# Patient Record
Sex: MALE | Race: WHITE | NOT HISPANIC OR LATINO | Employment: FULL TIME | URBAN - METROPOLITAN AREA
[De-identification: names, ages, dates, MRNs, and addresses within clinical notes are randomized per-mention and may not be internally consistent; named-entity substitution may affect disease eponyms.]

---

## 2017-01-10 RX ORDER — APIXABAN 5 MG/1
TABLET, FILM COATED ORAL
Qty: 60 TABLET | Refills: 6 | Status: SHIPPED | OUTPATIENT
Start: 2017-01-10 | End: 2017-08-14 | Stop reason: SDUPTHER

## 2017-01-23 RX ORDER — TRAZODONE HYDROCHLORIDE 100 MG/1
TABLET ORAL
Qty: 30 TABLET | Refills: 2 | Status: SHIPPED | OUTPATIENT
Start: 2017-01-23 | End: 2017-04-26 | Stop reason: SDUPTHER

## 2017-03-13 RX ORDER — LOSARTAN POTASSIUM 100 MG/1
TABLET ORAL
Qty: 30 TABLET | Refills: 6 | Status: SHIPPED | OUTPATIENT
Start: 2017-03-13 | End: 2017-08-14 | Stop reason: SDUPTHER

## 2017-03-13 RX ORDER — FINASTERIDE 1 MG/1
1 TABLET, FILM COATED ORAL DAILY
Qty: 30 TABLET | Refills: 5 | Status: SHIPPED | OUTPATIENT
Start: 2017-03-13 | End: 2017-09-12 | Stop reason: SDUPTHER

## 2017-04-27 RX ORDER — TRAZODONE HYDROCHLORIDE 100 MG/1
TABLET ORAL
Qty: 30 TABLET | Refills: 0 | Status: SHIPPED | OUTPATIENT
Start: 2017-04-27 | End: 2018-05-21

## 2017-05-22 RX ORDER — LEVOTHYROXINE SODIUM 88 UG/1
TABLET ORAL
Qty: 30 TABLET | Refills: 0 | Status: SHIPPED | OUTPATIENT
Start: 2017-05-22 | End: 2017-06-19 | Stop reason: SDUPTHER

## 2017-06-19 RX ORDER — LEVOTHYROXINE SODIUM 88 UG/1
TABLET ORAL
Qty: 30 TABLET | Refills: 3 | Status: SHIPPED | OUTPATIENT
Start: 2017-06-19 | End: 2017-09-27 | Stop reason: SDUPTHER

## 2017-08-14 ENCOUNTER — OFFICE VISIT (OUTPATIENT)
Dept: CARDIOLOGY | Facility: CLINIC | Age: 57
End: 2017-08-14

## 2017-08-14 VITALS
BODY MASS INDEX: 34.93 KG/M2 | HEART RATE: 65 BPM | DIASTOLIC BLOOD PRESSURE: 72 MMHG | SYSTOLIC BLOOD PRESSURE: 114 MMHG | HEIGHT: 74 IN | WEIGHT: 272.2 LBS

## 2017-08-14 DIAGNOSIS — I10 ESSENTIAL HYPERTENSION: ICD-10-CM

## 2017-08-14 DIAGNOSIS — E66.09 OBESITY DUE TO EXCESS CALORIES, UNSPECIFIED OBESITY SEVERITY: ICD-10-CM

## 2017-08-14 DIAGNOSIS — Z99.89 OSA ON CPAP: ICD-10-CM

## 2017-08-14 DIAGNOSIS — I48.0 PAROXYSMAL ATRIAL FIBRILLATION (HCC): Primary | ICD-10-CM

## 2017-08-14 DIAGNOSIS — G47.33 OSA ON CPAP: ICD-10-CM

## 2017-08-14 PROCEDURE — 99214 OFFICE O/P EST MOD 30 MIN: CPT | Performed by: INTERNAL MEDICINE

## 2017-08-14 RX ORDER — IBUPROFEN 400 MG/1
400 TABLET ORAL EVERY 6 HOURS PRN
COMMUNITY
End: 2018-01-17

## 2017-08-14 RX ORDER — LOSARTAN POTASSIUM 100 MG/1
100 TABLET ORAL DAILY
Qty: 90 TABLET | Refills: 3 | Status: SHIPPED | OUTPATIENT
Start: 2017-08-14 | End: 2018-08-15 | Stop reason: SDUPTHER

## 2017-08-14 NOTE — PROGRESS NOTES
[facility] AT [address], [address]  [place], [place], [address] [phone]  [email or web address]           OUTPATIENT CLINIC FOLLOW UP NOTE    Encounter Date:11/14/2016    Patient Care Team:  Patient Care Team:  Gail Chandler MD as PCP - General  Gail Chandler MD as PCP - Family Medicine    Subjective:   Reason for consultation:   Chief Complaint   Patient presents with   • Hypertension   • Atrial Fibrillation       HPI:    Katharine Lantigua is a 57 y.o. male.  Hypertension     Atrial Fibrillation   Past medical history includes atrial fibrillation.     The patient has a history of paroxysmal atrial fibrillation status post failed cardioversion, failed flecainide and dofetilide therapy, status post successful cryoablation of the pulmonary veins 11/2015 Dr Silveira, hypertension, recent orthostasis.  The patient presents for follow-up.    Since his last visit in 11/2016 he has done well from a cardiac standpoint.  He denies significant palpitations.  He has had one episode of mild self resolving palpitations after a long walk.  No chest pain or trouble breathing with exertion.  He has gained some weight recently.  He is undergoing significant stress.  His wife has spent much of the last year in and out of rehabilitation and hospitals for alcohol addiction.  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      PFSH:  Patient Active Problem List   Diagnosis   • Paroxysmal atrial fibrillation   • Obesity   • TACO on CPAP   • Hypothyroidism   • Gout   • Insomnia   • Essential hypertension         Current Outpatient Prescriptions:   •  ELIQUIS 5 MG tablet tablet, take 1 tablet by mouth twice a day, Disp: 60 tablet, Rfl: 6  •  finasteride (PROPECIA) 1 MG tablet, Take 1 tablet by mouth Daily., Disp: 30 tablet, Rfl: 5  •  ibuprofen (ADVIL,MOTRIN) 400 MG tablet, Take 400 mg by mouth Every 6  "(Six) Hours As Needed for Mild Pain (1-3)., Disp: , Rfl:   •  levothyroxine (SYNTHROID, LEVOTHROID) 88 MCG tablet, take 1 tablet by mouth once daily - DUE FOR FOLLOW UP APPOINTMENT, Disp: 30 tablet, Rfl: 3  •  losartan (COZAAR) 100 MG tablet, take 1 tablet by mouth once daily, Disp: 30 tablet, Rfl: 6  •  metoprolol tartrate (LOPRESSOR) 25 MG tablet, take 1 tablet by mouth once daily, Disp: 30 tablet, Rfl: 6  •  traZODone (DESYREL) 100 MG tablet, START WITH 1 TABLET AT BEDTIME, CAN INCREASE TO 1 1/2 TABLETS IF NEEDED FOR INSOMNIA, Disp: 30 tablet, Rfl: 0    No Known Allergies     reports that he has never smoked. He has never used smokeless tobacco.    Family History   Problem Relation Age of Onset   • Hypothyroidism Mother       in 80's   • Cancer Mother    • Cancer Father    • Diabetes type II Father    • Hypertension Father    • Coronary artery disease Father      60;s   • Obesity Other    • Coronary artery disease Other        Review of Systems:  Negative for exertional chest pain, dyspnea with exertion, orthopnea, PND, lower extremity edema, palpitations, lightheadedness, syncope.   Review of Systems  All other systems reviewed and are negative.    Objective:   Blood pressure 114/72, pulse 65, height 74\" (188 cm), weight 272 lb 3.2 oz (123 kg).  Physical Exam  CONSTITUTIONAL: No acute distress, normal affect  RESPIRATORY: Normal effort. Clear to auscultation bilaterally without wheezing or rales  CARDIOVASCULAR: Carotids with normal upstrokes without bruits.  Regular rate and rhythm with normal S1 and S2. Without murmur, gallop or rub.  PERIPHERAL VASCULAR: Normal radial pulses bilaterally. There is no lower extremity edema bilaterally. Normal DP pulses bilaterally      Labs:  Lab Results   Component Value Date    ALT 28 10/26/2016    AST 19 10/26/2016     Lab Results   Component Value Date    CHOL 249 (H) 10/26/2016    TRIG 203 (H) 10/26/2016    HDL 74 (H) 10/26/2016    LDLDIRECT 153 (H) 10/26/2016    " CREATININE 0.90 10/26/2016       Diagnostic Data:    Procedures    Assessment and Plan:   Katharine was seen today for hypertension and atrial fibrillation.    Diagnoses and all orders for this visit:    Paroxysmal atrial fibrillation  Essential hypertension  TACO on CPAP  Obesity due to excess calories, unspecified obesity severity  -Status post pulmonary vein cryoablation in 11/2015 by Dr. Silveira, remains in a regular rhythm today.  Continue metoprolol and apixaban  -Continue metoprolol, losartan for blood pressure.  At home ranges in the 120 to 130s systolic over 80s diastolic.  - ASCVD 6.9% 11/2014, PCP to recheck labs soon    - Dietary and exercise counseling was provided during this visit  - Patient to try high-protein low-carb diet.  Modified Atkins diet  - Patient to attempt increasing his exercise.  He had times walks several miles at the [facility] at a 14 min/mile pace    - Return in about 9 months (around 5/14/2018).    Gabriel Hanna MD, MSc, [facility]  Interventional Cardiology  [facility]

## 2017-09-12 RX ORDER — FINASTERIDE 1 MG/1
TABLET, FILM COATED ORAL
Qty: 30 TABLET | Refills: 0 | Status: SHIPPED | OUTPATIENT
Start: 2017-09-12 | End: 2017-11-09 | Stop reason: SDUPTHER

## 2017-09-27 RX ORDER — LEVOTHYROXINE SODIUM 88 UG/1
TABLET ORAL
Qty: 30 TABLET | Refills: 0 | Status: SHIPPED | OUTPATIENT
Start: 2017-09-27 | End: 2017-11-06 | Stop reason: SDUPTHER

## 2017-10-23 RX ORDER — LEVOTHYROXINE SODIUM 88 UG/1
TABLET ORAL
Qty: 30 TABLET | Refills: 0 | OUTPATIENT
Start: 2017-10-23

## 2017-10-23 RX ORDER — FINASTERIDE 1 MG/1
TABLET, FILM COATED ORAL
Qty: 30 TABLET | Refills: 0 | OUTPATIENT
Start: 2017-10-23

## 2017-10-23 NOTE — TELEPHONE ENCOUNTER
I left him a message letting him know we have not seen him since October of last year.  He will need an appointment for med refills.

## 2017-10-30 RX ORDER — FINASTERIDE 1 MG/1
TABLET, FILM COATED ORAL
Qty: 30 TABLET | Refills: 0 | OUTPATIENT
Start: 2017-10-30

## 2017-10-30 RX ORDER — LEVOTHYROXINE SODIUM 88 UG/1
TABLET ORAL
Qty: 30 TABLET | Refills: 0 | OUTPATIENT
Start: 2017-10-30

## 2017-11-06 ENCOUNTER — TELEPHONE (OUTPATIENT)
Dept: INTERNAL MEDICINE | Facility: CLINIC | Age: 57
End: 2017-11-06

## 2017-11-06 RX ORDER — LEVOTHYROXINE SODIUM 88 UG/1
88 TABLET ORAL DAILY
Qty: 30 TABLET | Refills: 0 | Status: SHIPPED | OUTPATIENT
Start: 2017-11-06 | End: 2017-11-09 | Stop reason: SDUPTHER

## 2017-11-06 NOTE — TELEPHONE ENCOUNTER
He is making an appointment to come in and is completely out of his thyroid medication.  Can you fill for one more month?

## 2017-11-08 ENCOUNTER — OFFICE VISIT (OUTPATIENT)
Dept: INTERNAL MEDICINE | Facility: CLINIC | Age: 57
End: 2017-11-08

## 2017-11-08 VITALS
SYSTOLIC BLOOD PRESSURE: 116 MMHG | BODY MASS INDEX: 34.6 KG/M2 | DIASTOLIC BLOOD PRESSURE: 72 MMHG | WEIGHT: 269.6 LBS | HEIGHT: 74 IN | TEMPERATURE: 96.8 F

## 2017-11-08 DIAGNOSIS — E78.49 OTHER HYPERLIPIDEMIA: ICD-10-CM

## 2017-11-08 DIAGNOSIS — Z11.59 NEED FOR HEPATITIS C SCREENING TEST: ICD-10-CM

## 2017-11-08 DIAGNOSIS — Z23 NEED FOR INFLUENZA VACCINATION: ICD-10-CM

## 2017-11-08 DIAGNOSIS — E03.8 OTHER SPECIFIED HYPOTHYROIDISM: Primary | ICD-10-CM

## 2017-11-08 DIAGNOSIS — I10 ESSENTIAL HYPERTENSION: ICD-10-CM

## 2017-11-08 DIAGNOSIS — Z99.89 OSA ON CPAP: ICD-10-CM

## 2017-11-08 DIAGNOSIS — G47.33 OSA ON CPAP: ICD-10-CM

## 2017-11-08 DIAGNOSIS — N40.0 BENIGN PROSTATIC HYPERPLASIA WITHOUT LOWER URINARY TRACT SYMPTOMS: ICD-10-CM

## 2017-11-08 LAB
ALBUMIN SERPL-MCNC: 4.1 G/DL (ref 3.2–4.8)
ALBUMIN/GLOB SERPL: 2.1 G/DL (ref 1.5–2.5)
ALP SERPL-CCNC: 46 U/L (ref 25–100)
ALT SERPL W P-5'-P-CCNC: 23 U/L (ref 7–40)
ANION GAP SERPL CALCULATED.3IONS-SCNC: 5 MMOL/L (ref 3–11)
ARTICHOKE IGE QN: 129 MG/DL (ref 0–130)
AST SERPL-CCNC: 15 U/L (ref 0–33)
BASOPHILS # BLD AUTO: 0.03 10*3/MM3 (ref 0–0.2)
BASOPHILS NFR BLD AUTO: 0.4 % (ref 0–1)
BILIRUB SERPL-MCNC: 0.6 MG/DL (ref 0.3–1.2)
BUN BLD-MCNC: 24 MG/DL (ref 9–23)
BUN/CREAT SERPL: 24 (ref 7–25)
CALCIUM SPEC-SCNC: 9 MG/DL (ref 8.7–10.4)
CHLORIDE SERPL-SCNC: 107 MMOL/L (ref 99–109)
CHOLEST SERPL-MCNC: 204 MG/DL (ref 0–200)
CO2 SERPL-SCNC: 28 MMOL/L (ref 20–31)
CREAT BLD-MCNC: 1 MG/DL (ref 0.6–1.3)
DEPRECATED RDW RBC AUTO: 47.5 FL (ref 37–54)
EOSINOPHIL # BLD AUTO: 0.19 10*3/MM3 (ref 0–0.3)
EOSINOPHIL NFR BLD AUTO: 2.5 % (ref 0–3)
ERYTHROCYTE [DISTWIDTH] IN BLOOD BY AUTOMATED COUNT: 13.9 % (ref 11.3–14.5)
GFR SERPL CREATININE-BSD FRML MDRD: 77 ML/MIN/1.73
GLOBULIN UR ELPH-MCNC: 2 GM/DL
GLUCOSE BLD-MCNC: 98 MG/DL (ref 70–100)
HCT VFR BLD AUTO: 45 % (ref 38.9–50.9)
HCV AB SER DONR QL: NORMAL
HDLC SERPL-MCNC: 69 MG/DL (ref 40–60)
HGB BLD-MCNC: 15.2 G/DL (ref 13.1–17.5)
IMM GRANULOCYTES # BLD: 0.02 10*3/MM3 (ref 0–0.03)
IMM GRANULOCYTES NFR BLD: 0.3 % (ref 0–0.6)
LYMPHOCYTES # BLD AUTO: 1.73 10*3/MM3 (ref 0.6–4.8)
LYMPHOCYTES NFR BLD AUTO: 22.7 % (ref 24–44)
MCH RBC QN AUTO: 31.4 PG (ref 27–31)
MCHC RBC AUTO-ENTMCNC: 33.8 G/DL (ref 32–36)
MCV RBC AUTO: 93 FL (ref 80–99)
MONOCYTES # BLD AUTO: 0.88 10*3/MM3 (ref 0–1)
MONOCYTES NFR BLD AUTO: 11.5 % (ref 0–12)
NEUTROPHILS # BLD AUTO: 4.77 10*3/MM3 (ref 1.5–8.3)
NEUTROPHILS NFR BLD AUTO: 62.6 % (ref 41–71)
PLATELET # BLD AUTO: 295 10*3/MM3 (ref 150–450)
PMV BLD AUTO: 10.6 FL (ref 6–12)
POTASSIUM BLD-SCNC: 4.3 MMOL/L (ref 3.5–5.5)
PROT SERPL-MCNC: 6.1 G/DL (ref 5.7–8.2)
PSA SERPL-MCNC: 0.45 NG/ML (ref 0–4)
RBC # BLD AUTO: 4.84 10*6/MM3 (ref 4.2–5.76)
SODIUM BLD-SCNC: 140 MMOL/L (ref 132–146)
T4 FREE SERPL-MCNC: 1.21 NG/DL (ref 0.89–1.76)
TRIGL SERPL-MCNC: 78 MG/DL (ref 0–150)
TSH SERPL DL<=0.05 MIU/L-ACNC: 4.53 MIU/ML (ref 0.35–5.35)
WBC NRBC COR # BLD: 7.62 10*3/MM3 (ref 3.5–10.8)

## 2017-11-08 PROCEDURE — 84439 ASSAY OF FREE THYROXINE: CPT | Performed by: INTERNAL MEDICINE

## 2017-11-08 PROCEDURE — 85025 COMPLETE CBC W/AUTO DIFF WBC: CPT | Performed by: INTERNAL MEDICINE

## 2017-11-08 PROCEDURE — 90471 IMMUNIZATION ADMIN: CPT | Performed by: INTERNAL MEDICINE

## 2017-11-08 PROCEDURE — 86803 HEPATITIS C AB TEST: CPT | Performed by: INTERNAL MEDICINE

## 2017-11-08 PROCEDURE — 99214 OFFICE O/P EST MOD 30 MIN: CPT | Performed by: INTERNAL MEDICINE

## 2017-11-08 PROCEDURE — 90686 IIV4 VACC NO PRSV 0.5 ML IM: CPT | Performed by: INTERNAL MEDICINE

## 2017-11-08 PROCEDURE — 80061 LIPID PANEL: CPT | Performed by: INTERNAL MEDICINE

## 2017-11-08 PROCEDURE — 80053 COMPREHEN METABOLIC PANEL: CPT | Performed by: INTERNAL MEDICINE

## 2017-11-08 PROCEDURE — 84153 ASSAY OF PSA TOTAL: CPT | Performed by: INTERNAL MEDICINE

## 2017-11-08 PROCEDURE — 84443 ASSAY THYROID STIM HORMONE: CPT | Performed by: INTERNAL MEDICINE

## 2017-11-08 RX ORDER — BUPROPION HYDROCHLORIDE 300 MG/1
450 TABLET ORAL DAILY
COMMUNITY
End: 2020-06-08

## 2017-11-08 NOTE — PROGRESS NOTES
"Subjective   Katharine Lantigua is a 57 y.o. male.     History of Present Illness   Here for f/u on:    Hypothyroid - is tired a lot, but he feels it is more from lack of sleep than the thyroid. Missed about a week recently    htn - bp has been good. On the losartan and metoprolol. Cardiology is prescribing    H/o a fib - he is status post pulmonary vein cryoablation in 11/2015 by Dr. Silveira. He is on eliquis. No recurrences    Insomnia - he is taking 2 of the 100mg's of the trazodone. He is seeing Dr Zaragoza now. He has also tried seroquel and ambien and lunesta - . He is not using the CPAP - trouble tolerating. He would like a referral to see [facility] and see what type of mask they recommend. Falls asleep ok, but wakes up a lot. Not much caffeine after 2-3pm. Some etoh but not as much as he had been on.     Depression - he is taking wellbutrin now through Dr Zaragoza. maybe helping a little.    The following portions of the patient's history were reviewed and updated as appropriate: allergies, current medications, past family history, past medical history, past social history, past surgical history and problem list.    Review of Systems   Constitutional: Positive for fatigue. Negative for activity change (walking1-2x/week), appetite change and unexpected weight change.   Cardiovascular: Negative for chest pain, palpitations and leg swelling.   Gastrointestinal: Negative for constipation and diarrhea.   Psychiatric/Behavioral: Positive for dysphoric mood and sleep disturbance.       Objective   Blood pressure 116/72, temperature 96.8 °F (36 °C), temperature source Temporal Artery , height 74\" (188 cm), weight 269 lb 9.6 oz (122 kg).  Physical Exam   Constitutional: He is oriented to person, place, and time. He appears well-developed and well-nourished. No distress.   HENT:   Head: Normocephalic and atraumatic.   Eyes: Conjunctivae and EOM are normal.   Cardiovascular: Normal rate, regular rhythm and normal heart " sounds.  Exam reveals no gallop and no friction rub.    No murmur heard.  Pulmonary/Chest: Effort normal and breath sounds normal. No respiratory distress. He has no wheezes.   Neurological: He is alert and oriented to person, place, and time.   Skin: Skin is warm and dry. He is not diaphoretic.   Psychiatric: He has a normal mood and affect. His behavior is normal. Judgment and thought content normal.       Assessment/Plan   Katharine was seen today for med refill, hypertension and hypothyroidism.    Diagnoses and all orders for this visit:    Other specified hypothyroidism  -     TSH  -     T4, Free    Need for influenza vaccination  -     Flu Vaccine Quad PF 3YR+ (FLUARIX/FLUZONE [id number])    Essential hypertension  -     CBC & Differential  -     Comprehensive Metabolic Panel    TACO - not using CPAP right now. Will refer to . He has long h/o insomnia as well, and is seeing Dr Zaragoza for this  -     Ambulatory Referral to Sleep Medicine    Benign prostatic hyperplasia without lower urinary tract symptoms  -     PSA    Other hyperlipidemia  -     Lipid Panel    Need for hepatitis C screening test  -     Hepatitis C Antibody      Prev - he wants to see urology for prostate exam - he prefers to schedule himself and will call if he needs referral. Colon will be due next year w/ [facility]. Hep C screen today.

## 2017-11-09 RX ORDER — LEVOTHYROXINE SODIUM 88 UG/1
88 TABLET ORAL DAILY
Qty: 90 TABLET | Refills: 3 | Status: SHIPPED | OUTPATIENT
Start: 2017-11-09 | End: 2018-10-09 | Stop reason: SDUPTHER

## 2017-11-09 RX ORDER — FINASTERIDE 1 MG/1
1 TABLET, FILM COATED ORAL DAILY
Qty: 90 TABLET | Refills: 3 | Status: SHIPPED | OUTPATIENT
Start: 2017-11-09 | End: 2018-11-14 | Stop reason: SDUPTHER

## 2018-01-17 ENCOUNTER — OFFICE VISIT (OUTPATIENT)
Dept: INTERNAL MEDICINE | Facility: CLINIC | Age: 58
End: 2018-01-17

## 2018-01-17 VITALS
WEIGHT: 260.8 LBS | HEART RATE: 59 BPM | OXYGEN SATURATION: 98 % | RESPIRATION RATE: 14 BRPM | HEIGHT: 74 IN | DIASTOLIC BLOOD PRESSURE: 74 MMHG | BODY MASS INDEX: 33.47 KG/M2 | TEMPERATURE: 97.4 F | SYSTOLIC BLOOD PRESSURE: 118 MMHG

## 2018-01-17 DIAGNOSIS — I83.90 VARICOSE VEIN OF LEG: ICD-10-CM

## 2018-01-17 DIAGNOSIS — Z00.00 ANNUAL PHYSICAL EXAM: Primary | ICD-10-CM

## 2018-01-17 DIAGNOSIS — M79.672 BILATERAL FOOT PAIN: ICD-10-CM

## 2018-01-17 DIAGNOSIS — Z99.89 OSA ON CPAP: ICD-10-CM

## 2018-01-17 DIAGNOSIS — G47.33 OSA ON CPAP: ICD-10-CM

## 2018-01-17 DIAGNOSIS — M79.671 BILATERAL FOOT PAIN: ICD-10-CM

## 2018-01-17 LAB
BILIRUB BLD-MCNC: NEGATIVE MG/DL
CLARITY, POC: CLEAR
COLOR UR: NORMAL
GLUCOSE UR STRIP-MCNC: NEGATIVE MG/DL
KETONES UR QL: NEGATIVE
LEUKOCYTE EST, POC: NEGATIVE
NITRITE UR-MCNC: NEGATIVE MG/ML
PH UR: 5 [PH] (ref 5–8)
PROT UR STRIP-MCNC: NEGATIVE MG/DL
RBC # UR STRIP: NEGATIVE /UL
SP GR UR: 1.03 (ref 1–1.03)
UROBILINOGEN UR QL: NORMAL

## 2018-01-17 PROCEDURE — 81003 URINALYSIS AUTO W/O SCOPE: CPT | Performed by: INTERNAL MEDICINE

## 2018-01-17 PROCEDURE — 99396 PREV VISIT EST AGE 40-64: CPT | Performed by: INTERNAL MEDICINE

## 2018-01-17 RX ORDER — LORAZEPAM 1 MG/1
1 TABLET ORAL EVERY 8 HOURS PRN
COMMUNITY
End: 2018-05-21

## 2018-01-17 NOTE — PROGRESS NOTES
Subjective   Katharine Lantigua is a 57 y.o. male.     History of Present Illness   Here for a physical    Exercise- trying to walk some but not every day. May try rowing class at gym too  Diet - not great, eating out a lot, more red meat and processed foods    TACO - will see sleep clinic at  in 2/18. Can't use the CPAP, just can't tolerate. Sleep is still poor. Uses ativan and trazodone at night. He is trying to cut  Back on ETOH, usually just wine a few times every few weeks w/ dinner. Trying to cut back on caffiene as well, and cut back on screen times    Anxiety - seeing Dr Zaragoza and on wellbutrin. Helps some, but still quite a bit of stress with his wife, who is alcoholic and has relapsed multiple times. He has 2 daughters and one is staying w/ her aunt, and the other may go to boarding school    htn - no problems w/ his meds, is taking just one a day on the metoprolol, and has been for some time, though he realized recently that the bottle says BID. Has not had any palpitations or recurrences of the a fib. He sees Dr Hanna in [place] B foot pain intermittently. Pain around the metatarsals. Not necessarily on the days he walks. No pain in am like plantar fasciitis. At times feet will swell. Also has noticed a small knot above R medial malleolus x several years - if he hits it accidentally, will have shooting pain up leg.     Has a varicose vein on left leg. Does hurt and does have the foot swelling sometimes. He did see vascular surgery - Dr Santamaria - 6-7 years ago and did try compression stocking but didn't seem to help. He would like to see what could be done for it    The following portions of the patient's history were reviewed and updated as appropriate: allergies, current medications, past family history, past medical history, past social history, past surgical history and problem list.    Review of Systems   Constitutional: Positive for unexpected weight change (has lost weight, trying to lose weight).  "Negative for activity change and appetite change.   Respiratory: Negative for shortness of breath.    Cardiovascular: Positive for leg swelling. Negative for chest pain and palpitations.   Gastrointestinal: Negative for abdominal pain, anal bleeding, blood in stool, constipation and diarrhea.   Genitourinary: Positive for frequency (nocturia - 1-5x/night).   Musculoskeletal: Positive for arthralgias (B foot pain).   Skin:        Varicose vein on L lower leg   Psychiatric/Behavioral: Positive for dysphoric mood. The patient is nervous/anxious.        Objective   Blood pressure 118/74, pulse 59, temperature 97.4 °F (36.3 °C), temperature source Temporal Artery , resp. rate 14, height 187.3 cm (73.75\"), weight 118 kg (260 lb 12.8 oz), SpO2 98 %.  Physical Exam   Constitutional: He is oriented to person, place, and time. He appears well-developed and well-nourished. No distress.   HENT:   Head: Normocephalic and atraumatic.   Eyes: Conjunctivae and EOM are normal.   Cardiovascular: Normal rate, regular rhythm, normal heart sounds and intact distal pulses.  Exam reveals no gallop and no friction rub.    No murmur heard.  Pulmonary/Chest: Effort normal and breath sounds normal. No respiratory distress. He has no wheezes.   Abdominal: Soft. Bowel sounds are normal.   Genitourinary: Prostate normal.   Musculoskeletal: Normal range of motion. He exhibits tenderness (soft nodule above right medial ankle that is tender. no tenderness over metatarsals or heel). He exhibits no edema.   Neurological: He is alert and oriented to person, place, and time.   Skin: Skin is warm and dry. He is not diaphoretic.   Varicose vein on left leg   Psychiatric: He has a normal mood and affect. His behavior is normal. Judgment and thought content normal.       Assessment/Plan   Katharine was seen today for annual exam and hypertension.    Diagnoses and all orders for this visit:    Annual physical exam -regular exercise/healthy diet/wt loss " encouraged. has had zostavax - can get shingrex when available. dT due in '24. colon due in '17-'19 - he would like to do around 12/18 - will set up reminder for this time. sunscreen use encouraged. He had labs done 11/17, so will repeat in 5/18 or 11/18. He declines any BPH medication at this point. OK to continue the metoprolol at just one a day since BP and pulse are well controlled  -     POC Urinalysis Dipstick, Automated    Bilateral foot pain  -     Ambulatory Referral to Orthopedic Surgery    Varicose vein of leg  -     Ambulatory Referral to Vascular Surgery    TACO - not using CPAP. He does have appt scheduled at  in 2/18. Wt loss encouraged as well    Situational anxiety/depression - names of counselors given

## 2018-02-14 ENCOUNTER — OFFICE VISIT (OUTPATIENT)
Dept: ORTHOPEDIC SURGERY | Facility: CLINIC | Age: 58
End: 2018-02-14

## 2018-02-14 VITALS
SYSTOLIC BLOOD PRESSURE: 125 MMHG | BODY MASS INDEX: 34.19 KG/M2 | HEART RATE: 65 BPM | WEIGHT: 257.94 LBS | HEIGHT: 73 IN | DIASTOLIC BLOOD PRESSURE: 90 MMHG

## 2018-02-14 DIAGNOSIS — B07.0 PLANTAR WART OF RIGHT FOOT: ICD-10-CM

## 2018-02-14 DIAGNOSIS — R22.41 ANKLE MASS, RIGHT: ICD-10-CM

## 2018-02-14 DIAGNOSIS — I87.8 VENOUS STASIS: ICD-10-CM

## 2018-02-14 DIAGNOSIS — M25.579 PAIN IN JOINT INVOLVING ANKLE AND FOOT, UNSPECIFIED LATERALITY: Primary | ICD-10-CM

## 2018-02-14 PROCEDURE — 99204 OFFICE O/P NEW MOD 45 MIN: CPT | Performed by: ORTHOPAEDIC SURGERY

## 2018-02-14 NOTE — PROGRESS NOTES
NEW PATIENT    Patient: Katharine Lantigua  : 1960    Primary Care Provider: Gail Chandler MD    Requesting Provider: As above    Pain, Numbness, and Edema of the Right Foot and Pain, Numbness, and Edema of the Left Foot      History    Chief Complaint: Several foot problems    History of Present Illness: This is a very pleasant 57-year-old  here with several foot and ankle problems.  One problem is that he has noticed swelling in his legs, for a number of years.  He has some venous stasis changes and varicosities, and he definitely needs to wear support stockings.  A second problem is that he has had some intermittent numbness in the left foot third and fourth toes, and some intermittent sharp and burning pain under the left foot second and third metatarsal heads.  Sometimes this is worse with driving.  Sometimes it's better with his shoe off.  He has not done anything specific for it.  He also has noted a 10 year history of a small nontender nodule on the dorsal aspect of the left great toe at DIP joint.  A more painful problem is he has an area about 4 cm above the ankle joint, just posterior to the tibia, about residential between the tibia and the Achilles tendon, that is exquisitely tender to palpation.  It's a thickened almost plaque-like feeling, and causes very remarkable pain.  He reports if anything hits it or touches it the has to jump.  This has been present about 3 years.  This pain he rates as an 8 out of 10 when it happens.  It happens that it gets bumped about once or twice a month.    Current Outpatient Prescriptions on File Prior to Visit   Medication Sig Dispense Refill   • apixaban (ELIQUIS) 5 MG tablet tablet Take 1 tablet by mouth Every 12 (Twelve) Hours. 180 tablet 3   • buPROPion XL (WELLBUTRIN XL) 300 MG 24 hr tablet Take 300 mg by mouth Daily.     • finasteride (PROPECIA) 1 MG tablet Take 1 tablet by mouth Daily. 90 tablet 3   • levothyroxine (SYNTHROID, LEVOTHROID) 88 MCG tablet  Take 1 tablet by mouth Daily. 90 tablet 3   • LORazepam (ATIVAN) 1 MG tablet Take 1 mg by mouth Every 8 (Eight) Hours As Needed for Sleep.     • losartan (COZAAR) 100 MG tablet Take 1 tablet by mouth Daily. 90 tablet 3   • metoprolol tartrate (LOPRESSOR) 25 MG tablet take 1 tablet by mouth twice a day 90 tablet 3   • traZODone (DESYREL) 100 MG tablet START WITH 1 TABLET AT BEDTIME, CAN INCREASE TO 1 1/2 TABLETS IF NEEDED FOR INSOMNIA (Patient taking differently: Take 2 pills nightly for insomnia.) 30 tablet 0     No current facility-administered medications on file prior to visit.       No Known Allergies   Past Medical History:   Diagnosis Date   • Abnormal adenosine sestamibi study    • Depression     Dr. Peterson   • H/O cardiovascular stress test 04/2010    negative   • H/O colonoscopy 12/2014    Dr. Olivier- neg. repeat in 3-5 yrs, previous h/o adenomatous polyp '12 & '13   • Heart disease    • History of diagnostic ultrasound 04/2016    fatty liver, multiple hypodense lesions in liver, GB nml, CT liver recommended   • Hypertension    • Hypothyroidism    • Obesity    • TACO on CPAP 03/2016    Obstructive sleep apnea wearing CPAP. - Dr. Amin   • Osteoarthritis    • Paroxysmal atrial fibrillation 09/2015   • Screening PSA (prostate specific antigen) 04/2016    0.4; 2/15 0.4; 1/14 0.4   • Skin cancer of face      Past Surgical History:   Procedure Laterality Date   • ANAL FISTULA REPAIR      1980 & 1990   • CARDIAC ABLATION     • COLONOSCOPY     • JOINT REPLACEMENT Right    • SEPTOPLASTY     • ARCENIO  08/18/2015    ARCENIO/external cardioversion into normal sinus rhythm with initiation of flecainide, 08/18/2015.  EF 55% to 60%.   • TONSILLECTOMY     • TOTAL HIP ARTHROPLASTY Right 1999    Right total hip replacement. (h/o childhood fracture)   • VENOUS ABLATION  110/02/2015    Status post cryoablation of the pulmonary veins, 11/02/2015, with maintenance of sinus rhythm on no antiarrhythmic.     Family History   Problem  "Relation Age of Onset   • Hypothyroidism Mother       in 80's   • Cancer Mother    • Cancer Father    • Diabetes type II Father    • Hypertension Father    • Coronary artery disease Father      60;s   • Obesity Other    • Coronary artery disease Other       Social History     Social History   • Marital status:      Spouse name: N/A   • Number of children: N/A   • Years of education: N/A     Occupational History   • Not on file.     Social History Main Topics   • Smoking status: Never Smoker   • Smokeless tobacco: Never Used   • Alcohol use 1.2 oz/week     2 Glasses of wine per week      Comment: nightly   • Drug use: No   • Sexual activity: Defer     Other Topics Concern   • Not on file     Social History Narrative        Review of Systems   Constitutional: Positive for fatigue.   HENT: Positive for hearing loss and tinnitus.    Eyes: Negative.    Respiratory: Positive for apnea and shortness of breath.    Cardiovascular: Positive for leg swelling.   Gastrointestinal: Negative.    Endocrine: Negative.    Genitourinary: Positive for difficulty urinating.   Musculoskeletal: Positive for arthralgias, back pain, neck pain and neck stiffness.   Skin: Negative.    Allergic/Immunologic: Negative.    Neurological: Negative.    Hematological: Negative.    Psychiatric/Behavioral: Positive for decreased concentration and sleep disturbance. The patient is nervous/anxious.        The following portions of the patient's history were reviewed and updated as appropriate: allergies, current medications, past family history, past medical history, past social history, past surgical history and problem list.    Physical Exam:   /90  Pulse 65  Ht 185.4 cm (73\")  Wt 117 kg (257 lb 15 oz)  BMI 34.03 kg/m2  GENERAL: Body habitus: obese    Lower extremity edema: Left: trace; Right: trace    Varicose veins:  Left: moderate; Right: moderate    Gait: normal     Mental Status:  awake and alert; oriented to person, " place, and time    Voice:  clear  SKIN:  warm and dry    Hair Growth:  Right:normal; Left:  normal  NAILS: Toenails: thick  HEENT: Head: Normocephalic, atraumatic,  without obvious abnormality.  eye: normal external eye, no icterus  ears: normal external ears  nose: normal external nose  pharynx: dental hygiene adequate  PULM:  Repiratory effort normal  CV:  Dorsalis Pedis:  Right: 2+; Left:2+    Posterior Tibial: Right:2+; Left:2+    Capillary Refill:  Brisk  MSK:  Hand:right handed and sensation intact      Tibia:  Right:  non tender; Left:  non tender      Ankle:  Right: There is a thickened almost plaque-like area about 1-1/2 cm in diameter just posterior to the tibia about 4 cm above the ankle joint.  It is markedly tender to palpation.  I really cannot easily palpated firmly enough to feel the edges.  That is how painful it is.  Otherwise he is nontender has normal range of motion.; Left:  non tender, ROM  normal and symmetric and motor function  normal      Foot:  Right:  He has a tender lesion just distal to the weightbearing surface under the fifth metatarsal head area, I think it's a plantar wart, he has some other callusing and slight tenderness under the metatarsals.; Left:  He is nontender to palpation in the third webspace today, slightly tender under the second and third metatarsal heads.  He has a 6-8 mm rubbery nodule just medial to the extensor tendon at the DIP joint of the great toe, it feels like a small ganglion cyst, it is nontender      NEURO: Heel Walking:  Right:  normal; Left:  normal    Toe Walking:  Right:  normal and With some pain in the small lesion that I think is a plantar wart; Left:  normal     Wana-Malia 5.07 monofilament test: normal    Lower extremity sensation: intact     Reflexes:  Biceps:  Right:  1+; Left:  1+           Quads:  Right:  2+; Left:  2+           Ankle:  Right:  1+; Left:  1+      Calf Atrophy:none    Motor Function: all 5/5         Medical Decision  Making    Data Review:   ordered and reviewed x-rays today    Assessment and Plan/ Diagnosis/Treatment options:   1. Pain in joint involving ankle and foot, unspecified laterality  He has several problems in the left foot.  One is metatarsalgia.  Another might be a neuroma in the third webspace.  It's difficult to tell today because he is nontender.  I do think he would benefit from custom orthotics and I gave him a prescription.  He also has a smaller nodule over the great toe.  I think it's a ganglion cyst.  We discussed the options of further imaging or aspiration, but because it's been present 10 years and it doesn't hurt, I think we can just observe it.  The chances of it being something malignant are extraordinarily small.  He agrees, he has no wish to proceed with further imaging unless it's needed.    2. Venous stasis  He definitely needs to wear support stockings, we discussed where to obtain them and what type    3. Ankle mass, right  The very tender mass in the right ankle is unusual.  It feels almost plaque-like , but I really can't palpate it well enough because it so painful.  It's larger than what I would expect from a neurilemmoma, I suppose it could be a leiomyoma.  Because it so unusual I think we need to do an MRI to evaluate it.  We will do this with and without contrast.  - MRI Ankle Right With & Without Contrast; Future    4. Plantar wart of right foot  I do think the small lesion on the right foot is a plantar wart.  He has children at home and have them, I explained it's a virus and can take this.  I explained the duct.tape treatment to him.

## 2018-02-22 ENCOUNTER — HOSPITAL ENCOUNTER (OUTPATIENT)
Dept: MRI IMAGING | Facility: HOSPITAL | Age: 58
Discharge: HOME | End: 2018-02-22
Attending: ORTHOPAEDIC SURGERY | Admitting: ORTHOPAEDIC SURGERY

## 2018-02-22 DIAGNOSIS — R22.41 ANKLE MASS, RIGHT: ICD-10-CM

## 2018-02-22 DIAGNOSIS — M25.579 PAIN IN JOINT INVOLVING ANKLE AND FOOT, UNSPECIFIED LATERALITY: ICD-10-CM

## 2018-02-22 PROCEDURE — A9577 INJ MULTIHANCE: HCPCS | Performed by: ORTHOPAEDIC SURGERY

## 2018-02-22 PROCEDURE — 82565 ASSAY OF CREATININE: CPT

## 2018-02-22 PROCEDURE — 73723 MRI JOINT LWR EXTR W/O&W/DYE: CPT

## 2018-02-22 PROCEDURE — 0 GADOBENATE DIMEGLUMINE 529 MG/ML SOLUTION: Performed by: ORTHOPAEDIC SURGERY

## 2018-02-22 RX ADMIN — GADOBENATE DIMEGLUMINE 19 ML: 529 INJECTION, SOLUTION INTRAVENOUS at 10:15

## 2018-02-23 LAB — CREAT BLDA-MCNC: 1 MG/DL (ref 0.6–1.3)

## 2018-04-18 ENCOUNTER — OFFICE VISIT (OUTPATIENT)
Dept: ORTHOPEDIC SURGERY | Facility: CLINIC | Age: 58
End: 2018-04-18

## 2018-04-18 DIAGNOSIS — I87.8 VENOUS STASIS: ICD-10-CM

## 2018-04-18 DIAGNOSIS — D49.2 NERVE SHEATH TUMOR: Primary | ICD-10-CM

## 2018-04-18 PROCEDURE — 99213 OFFICE O/P EST LOW 20 MIN: CPT | Performed by: ORTHOPAEDIC SURGERY

## 2018-04-18 NOTE — PROGRESS NOTES
ESTABLISHED PATIENT    Patient: Katharine Lantigua  : 1960    Primary Care Provider: Gail Chandler MD    Requesting Provider: As above    Follow-up (9 weeks -Pain in joint involving ankle and foot Bilat)      History    Chief Complaint: Right ankle mass, pain    History of Present Illness: He returns for follow-up of the MRI of his right ankle to look at the painful mass. I looked at the MRI myself and I showed it to him.  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  This is not surprising given the exquisite tenderness that he has.  We discussed the options.  He asked if a biopsy would be useful, but I explained I don't think it would given the possibility of sampling error.  He asked if this could be malignant I explained the risk is low, but you cannot say it's nonexistent.  He reports it's been there about 2-3 years, he asked if tumors can develop malignancy over time and I explained that indeed that is possible although not as likely in people without underlying syndromes such as neurofibromatosis.  I explained that the way I would approach it would be to surgically excise it.  I've seen 2 or 3 tumors involving the posterior tibial nerve in the past.  We also discussed having him see Dr. Elio Syed at  for a second opinion.  He would like to do that, he has the disc with his MRI.    He has seen Dr. Corral since I last saw him, Dr. Corral recommended prescription support stockings, and they discussed a possible ablation.  ( venous ablation)    Current Outpatient Prescriptions on File Prior to Visit   Medication Sig Dispense Refill   • apixaban (ELIQUIS) 5 MG tablet tablet Take 1 tablet by mouth Every 12 (Twelve) Hours. 180 tablet 3   • buPROPion XL (WELLBUTRIN XL) 300 MG 24 hr tablet Take 300 mg by mouth Daily.     • finasteride (PROPECIA) 1 MG tablet Take 1 tablet by mouth Daily. 90 tablet 3   • levothyroxine (SYNTHROID, LEVOTHROID) 88 MCG tablet Take 1 tablet by  mouth Daily. 90 tablet 3   • LORazepam (ATIVAN) 1 MG tablet Take 1 mg by mouth Every 8 (Eight) Hours As Needed for Sleep.     • losartan (COZAAR) 100 MG tablet Take 1 tablet by mouth Daily. 90 tablet 3   • metoprolol tartrate (LOPRESSOR) 25 MG tablet take 1 tablet by mouth twice a day 90 tablet 3   • traZODone (DESYREL) 100 MG tablet START WITH 1 TABLET AT BEDTIME, CAN INCREASE TO 1 1/2 TABLETS IF NEEDED FOR INSOMNIA (Patient taking differently: Take 2 pills nightly for insomnia.) 30 tablet 0     No current facility-administered medications on file prior to visit.       No Known Allergies   Past Medical History:   Diagnosis Date   • Abnormal adenosine sestamibi study    • Depression     Dr. Peterson   • H/O cardiovascular stress test 04/2010    negative   • H/O colonoscopy 12/2014    Dr. Olivier- neg. repeat in 3-5 yrs, previous h/o adenomatous polyp '12 & '13   • Heart disease    • History of diagnostic ultrasound 04/2016    fatty liver, multiple hypodense lesions in liver, GB nml, CT liver recommended   • Hypertension    • Hypothyroidism    • Obesity    • TACO on CPAP 03/2016    Obstructive sleep apnea wearing CPAP. - Dr. Amin   • Osteoarthritis    • Paroxysmal atrial fibrillation 09/2015   • Screening PSA (prostate specific antigen) 04/2016    0.4; 2/15 0.4; 1/14 0.4   • Skin cancer of face      Past Surgical History:   Procedure Laterality Date   • ANAL FISTULA REPAIR      1980 & 1990   • CARDIAC ABLATION     • COLONOSCOPY     • JOINT REPLACEMENT Right    • SEPTOPLASTY     • ARCENIO  08/18/2015    ARCENIO/external cardioversion into normal sinus rhythm with initiation of flecainide, 08/18/2015.  EF 55% to 60%.   • TONSILLECTOMY     • TOTAL HIP ARTHROPLASTY Right 1999    Right total hip replacement. (h/o childhood fracture)   • VENOUS ABLATION  110/02/2015    Status post cryoablation of the pulmonary veins, 11/02/2015, with maintenance of sinus rhythm on no antiarrhythmic.     Family History   Problem Relation Age of Onset    • Hypothyroidism Mother       in 80's   • Cancer Mother    • Cancer Father    • Diabetes type II Father    • Hypertension Father    • Coronary artery disease Father      60;s   • Obesity Other    • Coronary artery disease Other       Social History     Social History   • Marital status:      Spouse name: N/A   • Number of children: N/A   • Years of education: N/A     Occupational History   • Not on file.     Social History Main Topics   • Smoking status: Never Smoker   • Smokeless tobacco: Never Used   • Alcohol use 1.2 oz/week     2 Glasses of wine per week      Comment: nightly   • Drug use: No   • Sexual activity: Defer     Other Topics Concern   • Not on file     Social History Narrative   • No narrative on file        Review of Systems    The following portions of the patient's history were reviewed and updated as appropriate: allergies, current medications, past family history, past medical history, past social history, past surgical history and problem list.    Physical Exam:   There were no vitals taken for this visit.  GENERAL: Gait: normal       MSK:  Ankle:  Right: No change in the tender firm nodule posterior to the tibia a few centimeters above the ankle;            Data Review:   reviewed radiology images and reviewed radiology results    Assessment/Plan/Diagnosis/Treatment Options:   1. Nerve sheath tumor  As above we discussed the lesion in detail.  My recommendation would be excision.  I certainly would support a second opinion, and recommend that he see Dr. Elio Syed.  He has his MRI disks to take with him.  He will call back to let me know what he would like to do, whether he would like to have the mass removed or whether he would like Dr. Syed to do this.  As we discussed above, I think the likelihood of malignancy is low, but given that it's large and painful I would recommend removal.  - Ambulatory Referral to Orthopedic Surgery  2.  Venous stasis.  As above he saw  Dr. Corral, I would definitely recommend he continue to wear support stockings.

## 2018-05-21 ENCOUNTER — OFFICE VISIT (OUTPATIENT)
Dept: CARDIOLOGY | Facility: CLINIC | Age: 58
End: 2018-05-21

## 2018-05-21 VITALS
SYSTOLIC BLOOD PRESSURE: 130 MMHG | WEIGHT: 276 LBS | HEART RATE: 68 BPM | HEIGHT: 73 IN | BODY MASS INDEX: 36.58 KG/M2 | DIASTOLIC BLOOD PRESSURE: 80 MMHG

## 2018-05-21 DIAGNOSIS — I48.0 PAROXYSMAL ATRIAL FIBRILLATION (HCC): Primary | ICD-10-CM

## 2018-05-21 DIAGNOSIS — I10 ESSENTIAL HYPERTENSION: ICD-10-CM

## 2018-05-21 PROCEDURE — 93000 ELECTROCARDIOGRAM COMPLETE: CPT | Performed by: INTERNAL MEDICINE

## 2018-05-21 PROCEDURE — 99214 OFFICE O/P EST MOD 30 MIN: CPT | Performed by: INTERNAL MEDICINE

## 2018-05-21 NOTE — PROGRESS NOTES
[facility] AT [address], [address]  [place], [place], [address] [phone]  [email or web address]           OUTPATIENT CLINIC FOLLOW UP NOTE    Patient Care Team:  Patient Care Team:  Gail Chandler MD as PCP - General  Gail Chandler MD as PCP - Family Medicine  Alex Zaragoza MD as Consulting Physician (Psychiatry)    Subjective:   Reason for consultation:   Chief Complaint   Patient presents with   • Atrial Fibrillation       HPI:    Katharine Lantigua is a 58 y.o. male.  Hypertension     Atrial Fibrillation   Past medical history includes atrial fibrillation.     The patient has a history of paroxysmal atrial fibrillation status post failed cardioversion, failed flecainide and dofetilide therapy, status post successful cryoablation of the pulmonary veins 11/2015 Dr Silveira, hypertension, recent orthostasis.  The patient presents for follow-up.    Since his last visit the patient has been doing well from an arrhythmic standpoint.  Denies palpitations.  Denies chest pain or exertional dyspnea.  Tolerating his medicines without difficulty.    Continues to have significant work related and emotional stress. His wife has ongoing alcohol addiction.  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    PFSH:  Patient Active Problem List   Diagnosis   • Paroxysmal atrial fibrillation   • Obesity   • TACO on CPAP   • Hypothyroidism   • Gout   • Insomnia   • Essential hypertension   • Pain in joint involving ankle and foot   • Ankle mass, right   • Venous stasis   • Plantar wart of right foot   • Nerve sheath tumor         Current Outpatient Prescriptions:   •  apixaban (ELIQUIS) 5 MG tablet tablet, Take 1 tablet by mouth Every 12 (Twelve) Hours., Disp: 180 tablet, Rfl: 3  •  buPROPion XL (WELLBUTRIN XL) 300 MG 24 hr tablet, Take 300 mg by mouth Daily., Disp: , Rfl:   •  finasteride  "(PROPECIA) 1 MG tablet, Take 1 tablet by mouth Daily., Disp: 90 tablet, Rfl: 3  •  levothyroxine (SYNTHROID, LEVOTHROID) 88 MCG tablet, Take 1 tablet by mouth Daily., Disp: 90 tablet, Rfl: 3  •  losartan (COZAAR) 100 MG tablet, Take 1 tablet by mouth Daily., Disp: 90 tablet, Rfl: 3  •  metoprolol tartrate (LOPRESSOR) 25 MG tablet, take 1 tablet by mouth twice a day, Disp: 90 tablet, Rfl: 3    No Known Allergies     reports that he has never smoked. He has never used smokeless tobacco.    Family History   Problem Relation Age of Onset   • Hypothyroidism Mother          in 80's   • Cancer Mother    • Cancer Father    • Diabetes type II Father    • Hypertension Father    • Coronary artery disease Father         60;s   • Obesity Other    • Coronary artery disease Other        Review of Systems:  Negative for exertional chest pain, dyspnea with exertion, orthopnea, PND, lower extremity edema, palpitations, lightheadedness, syncope.   Review of Systems    Objective:   Blood pressure 130/80, pulse 68, height 185.4 cm (73\"), weight 125 kg (276 lb).  Physical Exam  CONSTITUTIONAL: No acute distress, normal affect  RESPIRATORY: Normal effort. Clear to auscultation bilaterally without wheezing or rales  CARDIOVASCULAR: Carotids with normal upstrokes without bruits.  Regular rate and rhythm with normal S1 and S2. Without murmur, gallop or rub.  PERIPHERAL VASCULAR: Normal radial pulses bilaterally. There is no lower extremity edema bilaterally. Normal DP pulses bilaterally      Labs:  Lab Results   Component Value Date    ALT 23 2017    AST 15 2017     Lab Results   Component Value Date    CHOL 204 (H) 2017    TRIG 78 2017    HDL 69 (H) 2017    CREATININE 1.00 2018       Diagnostic Data:      ECG 12 Lead  Date/Time: 2018 4:29 PM  Performed by: DYLAN JEROME  Authorized by: DYLAN JEROME   Rhythm: sinus rhythm  Comments: Heart rate 59 bpm, QRS 84 ms,  " ms            Assessment and Plan:   Katharine was seen today for hypertension and atrial fibrillation.    Diagnoses and all orders for this visit:    Paroxysmal atrial fibrillation  Essential hypertension  TACO on CPAP  Obesity due to excess calories, unspecified obesity severity  -Status post pulmonary vein cryoablation in 11/2015 by Dr. Silveira, remains in a regular rhythm today.  Continue metoprolol and apixaban  -Continue metoprolol, losartan for blood pressure.  At home ranges in the 120 to 130s systolic over 80s diastolic.  - ASCVD 7.6% as of 11/2017 labs, discussed the risk and benefits of being on a statin.  Patient wishes to hold off for now.  - Dietary and exercise counseling was provided during this visit  - Patient to try high-protein low-carb diet.  Modified Atkins diet  - Patient to attempt increasing his exercise.  He had at times walked several miles at the [facility] at a 14 min/mile pace    - Return in about 6 months (around 11/21/2018).  - If  stable from a cardiovascular standpoint at next visit, we'll go to every year    -Greater than 25 minutes was spent with the patient discussing current conditions and plan of care     Gabriel Hanna MD, MSc, [facility]  Interventional Cardiology  [facility] at [facility]

## 2018-07-16 RX ORDER — APIXABAN 5 MG/1
TABLET, FILM COATED ORAL
Qty: 180 TABLET | Refills: 3 | Status: SHIPPED | OUTPATIENT
Start: 2018-07-16 | End: 2019-07-14 | Stop reason: SDUPTHER

## 2018-08-16 RX ORDER — LOSARTAN POTASSIUM 100 MG/1
TABLET ORAL
Qty: 90 TABLET | Refills: 3 | Status: SHIPPED | OUTPATIENT
Start: 2018-08-16 | End: 2019-08-10 | Stop reason: SDUPTHER

## 2018-08-29 ENCOUNTER — OFFICE VISIT (OUTPATIENT)
Dept: INTERNAL MEDICINE | Facility: CLINIC | Age: 58
End: 2018-08-29

## 2018-08-29 VITALS
BODY MASS INDEX: 38.25 KG/M2 | TEMPERATURE: 98.1 F | SYSTOLIC BLOOD PRESSURE: 134 MMHG | OXYGEN SATURATION: 98 % | DIASTOLIC BLOOD PRESSURE: 90 MMHG | HEART RATE: 62 BPM | HEIGHT: 73 IN | WEIGHT: 288.6 LBS

## 2018-08-29 DIAGNOSIS — R06.02 SHORTNESS OF BREATH: Primary | ICD-10-CM

## 2018-08-29 DIAGNOSIS — R79.89 ELEVATED BRAIN NATRIURETIC PEPTIDE (BNP) LEVEL: ICD-10-CM

## 2018-08-29 DIAGNOSIS — M79.89 LEG SWELLING: ICD-10-CM

## 2018-08-29 PROCEDURE — 93000 ELECTROCARDIOGRAM COMPLETE: CPT | Performed by: NURSE PRACTITIONER

## 2018-08-29 PROCEDURE — 85379 FIBRIN DEGRADATION QUANT: CPT | Performed by: NURSE PRACTITIONER

## 2018-08-29 PROCEDURE — 80053 COMPREHEN METABOLIC PANEL: CPT | Performed by: NURSE PRACTITIONER

## 2018-08-29 PROCEDURE — 85027 COMPLETE CBC AUTOMATED: CPT | Performed by: NURSE PRACTITIONER

## 2018-08-29 PROCEDURE — 99214 OFFICE O/P EST MOD 30 MIN: CPT | Performed by: NURSE PRACTITIONER

## 2018-08-29 PROCEDURE — 83880 ASSAY OF NATRIURETIC PEPTIDE: CPT | Performed by: NURSE PRACTITIONER

## 2018-08-29 RX ORDER — SILDENAFIL 100 MG/1
TABLET, FILM COATED ORAL AS NEEDED
COMMUNITY
End: 2020-12-14

## 2018-08-29 RX ORDER — TAMSULOSIN HYDROCHLORIDE 0.4 MG/1
CAPSULE ORAL
COMMUNITY
End: 2020-06-08

## 2018-08-29 RX ORDER — MELOXICAM 15 MG/1
TABLET ORAL
COMMUNITY
End: 2019-01-14 | Stop reason: ALTCHOICE

## 2018-08-29 RX ORDER — TRAZODONE HYDROCHLORIDE 100 MG/1
200 TABLET ORAL DAILY
Refills: 0 | COMMUNITY
Start: 2018-08-13 | End: 2019-03-18

## 2018-08-30 ENCOUNTER — TELEPHONE (OUTPATIENT)
Dept: INTERNAL MEDICINE | Facility: CLINIC | Age: 58
End: 2018-08-30

## 2018-08-30 DIAGNOSIS — M79.89 LEG SWELLING: Primary | ICD-10-CM

## 2018-08-30 RX ORDER — FUROSEMIDE 20 MG/1
20 TABLET ORAL DAILY
Qty: 3 TABLET | Refills: 0 | Status: SHIPPED | OUTPATIENT
Start: 2018-08-30 | End: 2018-08-31 | Stop reason: SDUPTHER

## 2018-08-30 NOTE — TELEPHONE ENCOUNTER
----- Message from DEAN Das sent at 8/30/2018  3:03 PM EDT -----  Please let him know that his d dimer was normal so I do not suspect DVT.  He did have a very slight elevation in BNP, otherwise labs looked ok.   I think we could do a low dose of lasix for the swelling for a couple of days and have him FU with his cardiologist to be sure they don't want to do any further testing like echo, stress test,  Etc..  Have him be sure to eat potassium containing foods while taking the lasix(banana, tomato etc..)

## 2018-08-31 DIAGNOSIS — M79.89 LEG SWELLING: ICD-10-CM

## 2018-08-31 RX ORDER — FUROSEMIDE 20 MG/1
20 TABLET ORAL DAILY
Qty: 30 TABLET | Refills: 0 | Status: SHIPPED | OUTPATIENT
Start: 2018-08-31 | End: 2018-09-03

## 2018-09-05 ENCOUNTER — OFFICE VISIT (OUTPATIENT)
Dept: CARDIOLOGY | Facility: CLINIC | Age: 58
End: 2018-09-05

## 2018-09-05 VITALS
HEIGHT: 74 IN | BODY MASS INDEX: 36.5 KG/M2 | HEART RATE: 54 BPM | SYSTOLIC BLOOD PRESSURE: 130 MMHG | WEIGHT: 284.4 LBS | DIASTOLIC BLOOD PRESSURE: 84 MMHG

## 2018-09-05 DIAGNOSIS — I10 ESSENTIAL HYPERTENSION: ICD-10-CM

## 2018-09-05 DIAGNOSIS — R60.0 BILATERAL LOWER EXTREMITY EDEMA: ICD-10-CM

## 2018-09-05 DIAGNOSIS — I48.0 PAROXYSMAL ATRIAL FIBRILLATION (HCC): Primary | ICD-10-CM

## 2018-09-05 DIAGNOSIS — R06.02 SHORTNESS OF BREATH: ICD-10-CM

## 2018-09-05 PROCEDURE — 99214 OFFICE O/P EST MOD 30 MIN: CPT | Performed by: INTERNAL MEDICINE

## 2018-09-05 RX ORDER — FUROSEMIDE 20 MG/1
20 TABLET ORAL DAILY
COMMUNITY
End: 2018-09-22

## 2018-09-05 NOTE — PROGRESS NOTES
[facility] AT [address], [address]  [place], [place], [address] [phone]  [email or web address]           OUTPATIENT CLINIC FOLLOW UP NOTE    Patient Care Team:  Patient Care Team:  Gail Chandler MD as PCP - General  Gail Chandler MD as PCP - Family Medicine  Alex Zaragoza MD as Consulting Physician (Psychiatry)    Subjective:   Reason for consultation:   Chief Complaint   Patient presents with   • Shortness of Breath   • Leg Swelling   • Fatigue   • Atrial Fibrillation   • Hypertension       HPI:    Katharine Lantigua is a 58 y.o. male.  Atrial Fibrillation   Symptoms include shortness of breath. Past medical history includes atrial fibrillation.   Hypertension   Associated symptoms include shortness of breath.   Shortness of Breath     Leg Swelling     Fatigue       The patient has a history of paroxysmal atrial fibrillation status post failed cardioversion, failed flecainide and dofetilide therapy, status post successful cryoablation of the pulmonary veins 11/2015 Dr Silveira, hypertension, recent orthostasis.  The patient presents for early follow-up.    Since last being seen the patient reports that he had been doing fairly well until approximately 3 months ago when he started developing shortness of breath.  He also notes that for the past couple of weeks he has had increased lower extremity edema.  He was seen by his PCP who started him on Lasix 20 by mouth daily.  Since starting this medication he has had some improvement in his symptoms.  He does now however significant edema at the end of the day when his legs in dependent.  He reports a 28 pound weight gain since January.  Since starting Lasix he has dropped 5 pounds.  He states his blood pressure has been elevated at home recently, but is concerned that his blood pressure cuff is malfunctioning.  He denies any chest pain, palpitations, orthopnea, or PND.     The patient had been taking his  metoprolol once a day.  Since last Thursday he is been taking it twice a day.  Prior to this his blood pressure was elevated at home in the 150s.  Today his blood pressure is acceptable.      PFSH:  Patient Active Problem List   Diagnosis   • Paroxysmal atrial fibrillation (CMS/HCC)   • Obesity   • TACO on CPAP   • Hypothyroidism   • Gout   • Insomnia   • Essential hypertension   • Pain in joint involving ankle and foot   • Ankle mass, right   • Venous stasis   • Plantar wart of right foot   • Nerve sheath tumor         Current Outpatient Prescriptions:   •  buPROPion XL (WELLBUTRIN XL) 300 MG 24 hr tablet, Take 300 mg by mouth Daily., Disp: , Rfl:   •  ELIQUIS 5 MG tablet tablet, take 1 tablet by mouth every 12 hours, Disp: 180 tablet, Rfl: 3  •  finasteride (PROPECIA) 1 MG tablet, Take 1 tablet by mouth Daily., Disp: 90 tablet, Rfl: 3  •  furosemide (LASIX) 20 MG tablet, Take 20 mg by mouth Daily., Disp: , Rfl:   •  levothyroxine (SYNTHROID, LEVOTHROID) 88 MCG tablet, Take 1 tablet by mouth Daily., Disp: 90 tablet, Rfl: 3  •  losartan (COZAAR) 100 MG tablet, take 1 tablet by mouth once daily, Disp: 90 tablet, Rfl: 3  •  meloxicam (MOBIC) 15 MG tablet, meloxicam 15 mg tablet  Take 1 tablet every day by oral route., Disp: , Rfl:   •  metoprolol tartrate (LOPRESSOR) 25 MG tablet, take 1 tablet by mouth twice a day, Disp: 90 tablet, Rfl: 3  •  sildenafil (VIAGRA) 100 MG tablet, sildenafil 100 mg tablet  Take 1 tablet every day by oral route as needed., Disp: , Rfl:   •  tamsulosin (FLOMAX) 0.4 MG capsule 24 hr capsule, tamsulosin 0.4 mg capsule  Take 1 capsule every day by oral route., Disp: , Rfl:   •  traZODone (DESYREL) 100 MG tablet, Take 200 mg by mouth Daily., Disp: , Rfl: 0    No Known Allergies     reports that he has never smoked. He has never used smokeless tobacco.    Family History   Problem Relation Age of Onset   • Hypothyroidism Mother          in 80's   • Cancer Mother    • Cancer Father    •  "Diabetes type II Father    • Hypertension Father    • Coronary artery disease Father         60;s   • Obesity Other    • Coronary artery disease Other        Review of Systems:  Positive for shortness of breath and lower extremity edema.  Negative for exertional chest pain, orthopnea, PND,palpitations, lightheadedness, syncope.   Review of Systems   Respiratory: Positive for shortness of breath.        Objective:   Blood pressure 130/84, pulse 54, height 188 cm (74\"), weight 129 kg (284 lb 6.4 oz).  Physical Exam  CONSTITUTIONAL: No acute distress, normal affect  RESPIRATORY: Normal effort. Clear to auscultation bilaterally without wheezing or rales  CARDIOVASCULAR: Carotids with normal upstrokes without bruits.  Regular rate and rhythm with normal S1 and S2. Without murmur, gallop or rub.  PERIPHERAL VASCULAR: Normal radial pulses bilaterally. There is 1-2+ lower extremity edema bilaterally.       Labs:  Lab Results   Component Value Date    ALT 28 08/29/2018    AST 16 08/29/2018     Lab Results   Component Value Date    CHOL 204 (H) 11/08/2017    TRIG 78 11/08/2017    HDL 69 (H) 11/08/2017    CREATININE 1.05 08/29/2018       Diagnostic Data:    Procedures    Assessment and Plan:   Katharine was seen today for hypertension and atrial fibrillation.    Diagnoses and all orders for this visit:    Paroxysmal atrial fibrillation  Essential hypertension  TACO on CPAP  Obesity due to excess calories, unspecified obesity severity  -Status post pulmonary vein cryoablation in 11/2015 by Dr. Silveira, remains in a regular rhythm today.  Continue metoprolol and apixaban  -Continue metoprolol, losartan for blood pressure.  Has been elevated recently as high as 150/100 at times but is improved since taking metoprolol appropriately, twice a day.  Patient to bring home blood pressure cuff to next visit with either PCP or here.  - ASCVD 7.6% as of 11/2017 labs, discussed the risk and benefits of being on a statin.  Patient wishes to " hold off for now.    Shortness of Breath  Lower Extremity Edema  -Etiology is likely multifactorial including weight gain, left hip limitations, lack of exercise due to left hip problems and possible diastolic heart failure in the setting of prior atrial fibrillation.  -Continue Lasix 20 mg by mouth daily.  -Echocardiogram to evaluate for start heart failure  -Repeat BMP will be obtained when patient returns for echocardiogram.  -Should the patient develop recurrent palpitations or chest pain, would have to undergo an event monitor or stress test respectively.  -Recommended lifestyle and risk factor modification, weight loss, and to continue his orthopedic workup for his left hip arthritis.      - Return in about 6 months (around 3/5/2019).    DEAN Willard obtained past medical, family history, social history, review of systems and functioned as a scribe for the remainder of the dictation for Dr. Hanna.    I, Gabriel Hanna MD, personally performed the services as scribed by the above named individual. I have made any necessary edits and it is both accurate and complete.     Gabriel Hanna MD, MSc, [facility]  Interventional Cardiology  [facility] at [facility]

## 2018-09-22 ENCOUNTER — TELEPHONE (OUTPATIENT)
Dept: INTERNAL MEDICINE | Facility: CLINIC | Age: 58
End: 2018-09-22

## 2018-09-22 DIAGNOSIS — I10 ESSENTIAL HYPERTENSION: Primary | ICD-10-CM

## 2018-09-22 RX ORDER — FUROSEMIDE 40 MG/1
40 TABLET ORAL DAILY
Qty: 30 TABLET | Refills: 1 | Status: SHIPPED | OUTPATIENT
Start: 2018-09-22 | End: 2018-11-25 | Stop reason: SDUPTHER

## 2018-09-22 NOTE — TELEPHONE ENCOUNTER
Pt sent me message that he has been taking 2 of the 20mg lasix daily and needs refill. I will send in, and I asked him to come by our office this week to check BMP since on the higher dose

## 2018-10-01 ENCOUNTER — LAB (OUTPATIENT)
Dept: INTERNAL MEDICINE | Facility: CLINIC | Age: 58
End: 2018-10-01

## 2018-10-01 ENCOUNTER — HOSPITAL ENCOUNTER (OUTPATIENT)
Dept: CARDIOLOGY | Facility: HOSPITAL | Age: 58
Discharge: HOME | End: 2018-10-01
Admitting: INTERNAL MEDICINE

## 2018-10-01 DIAGNOSIS — I10 ESSENTIAL HYPERTENSION: ICD-10-CM

## 2018-10-01 LAB
ANION GAP SERPL CALCULATED.3IONS-SCNC: 12 MMOL/L (ref 3–11)
BUN BLD-MCNC: 28 MG/DL (ref 9–23)
BUN/CREAT SERPL: 23.9 (ref 7–25)
CALCIUM SPEC-SCNC: 9.4 MG/DL (ref 8.7–10.4)
CHLORIDE SERPL-SCNC: 102 MMOL/L (ref 99–109)
CO2 SERPL-SCNC: 25 MMOL/L (ref 20–31)
CREAT BLD-MCNC: 1.17 MG/DL (ref 0.6–1.3)
GFR SERPL CREATININE-BSD FRML MDRD: 64 ML/MIN/1.73
GLUCOSE BLD-MCNC: 99 MG/DL (ref 70–100)
POTASSIUM BLD-SCNC: 4.3 MMOL/L (ref 3.5–5.5)
SODIUM BLD-SCNC: 139 MMOL/L (ref 132–146)

## 2018-10-01 PROCEDURE — 80048 BASIC METABOLIC PNL TOTAL CA: CPT | Performed by: INTERNAL MEDICINE

## 2018-10-02 ENCOUNTER — TELEPHONE (OUTPATIENT)
Dept: INTERNAL MEDICINE | Facility: CLINIC | Age: 58
End: 2018-10-02

## 2018-10-02 NOTE — TELEPHONE ENCOUNTER
----- Message from Gail Chandler MD sent at 10/2/2018  8:23 AM EDT -----  Can you let him know potassium and kidney function look OK on higher dose of the lasix

## 2018-10-09 RX ORDER — LEVOTHYROXINE SODIUM 88 UG/1
TABLET ORAL
Qty: 30 TABLET | Refills: 0 | Status: SHIPPED | OUTPATIENT
Start: 2018-10-09 | End: 2018-11-21 | Stop reason: SDUPTHER

## 2018-10-23 ENCOUNTER — HOSPITAL ENCOUNTER (OUTPATIENT)
Dept: CARDIOLOGY | Facility: HOSPITAL | Age: 58
Discharge: HOME | End: 2018-10-23
Admitting: NURSE PRACTITIONER

## 2018-10-23 VITALS
WEIGHT: 284 LBS | DIASTOLIC BLOOD PRESSURE: 81 MMHG | HEIGHT: 74 IN | SYSTOLIC BLOOD PRESSURE: 133 MMHG | BODY MASS INDEX: 36.45 KG/M2

## 2018-10-23 DIAGNOSIS — R60.0 BILATERAL LOWER EXTREMITY EDEMA: ICD-10-CM

## 2018-10-23 DIAGNOSIS — R06.02 SHORTNESS OF BREATH: ICD-10-CM

## 2018-10-23 LAB
BH CV ECHO MEAS - AO MAX PG (FULL): 1.1 MMHG
BH CV ECHO MEAS - AO MAX PG: 6 MMHG
BH CV ECHO MEAS - AO MEAN PG (FULL): 0.94 MMHG
BH CV ECHO MEAS - AO MEAN PG: 3.7 MMHG
BH CV ECHO MEAS - AO ROOT AREA (BSA CORRECTED): 1.3
BH CV ECHO MEAS - AO ROOT AREA: 9 CM^2
BH CV ECHO MEAS - AO ROOT DIAM: 3.4 CM
BH CV ECHO MEAS - AO V2 MAX: 122.2 CM/SEC
BH CV ECHO MEAS - AO V2 MEAN: 91.5 CM/SEC
BH CV ECHO MEAS - AO V2 VTI: 28 CM
BH CV ECHO MEAS - AVA(I,A): 2.8 CM^2
BH CV ECHO MEAS - AVA(I,D): 2.8 CM^2
BH CV ECHO MEAS - AVA(V,A): 3 CM^2
BH CV ECHO MEAS - AVA(V,D): 3 CM^2
BH CV ECHO MEAS - BSA(HAYCOCK): 2.6 M^2
BH CV ECHO MEAS - BSA: 2.5 M^2
BH CV ECHO MEAS - BZI_BMI: 36.5 KILOGRAMS/M^2
BH CV ECHO MEAS - BZI_METRIC_HEIGHT: 188 CM
BH CV ECHO MEAS - BZI_METRIC_WEIGHT: 128.8 KG
BH CV ECHO MEAS - EDV(CUBED): 127.4 ML
BH CV ECHO MEAS - EDV(MOD-SP2): 153 ML
BH CV ECHO MEAS - EDV(MOD-SP4): 158 ML
BH CV ECHO MEAS - EDV(TEICH): 120 ML
BH CV ECHO MEAS - EF(CUBED): 64.5 %
BH CV ECHO MEAS - EF(MOD-SP2): 65.4 %
BH CV ECHO MEAS - EF(MOD-SP4): 62 %
BH CV ECHO MEAS - EF(TEICH): 55.8 %
BH CV ECHO MEAS - ESV(CUBED): 45.2 ML
BH CV ECHO MEAS - ESV(MOD-SP2): 53 ML
BH CV ECHO MEAS - ESV(MOD-SP4): 60 ML
BH CV ECHO MEAS - ESV(TEICH): 53.1 ML
BH CV ECHO MEAS - FS: 29.2 %
BH CV ECHO MEAS - IVS/LVPW: 0.99
BH CV ECHO MEAS - IVSD: 1.1 CM
BH CV ECHO MEAS - LA DIMENSION: 4.6 CM
BH CV ECHO MEAS - LA/AO: 1.4
BH CV ECHO MEAS - LAD MAJOR: 6.8 CM
BH CV ECHO MEAS - LAT PEAK E' VEL: 10.7 CM/SEC
BH CV ECHO MEAS - LATERAL E/E' RATIO: 9.4
BH CV ECHO MEAS - LV DIASTOLIC VOL/BSA (35-75): 62.6 ML/M^2
BH CV ECHO MEAS - LV MASS(C)D: 218.8 GRAMS
BH CV ECHO MEAS - LV MASS(C)DI: 86.7 GRAMS/M^2
BH CV ECHO MEAS - LV MAX PG: 4.9 MMHG
BH CV ECHO MEAS - LV MEAN PG: 2.8 MMHG
BH CV ECHO MEAS - LV SYSTOLIC VOL/BSA (12-30): 23.8 ML/M^2
BH CV ECHO MEAS - LV V1 MAX: 111.1 CM/SEC
BH CV ECHO MEAS - LV V1 MEAN: 79.7 CM/SEC
BH CV ECHO MEAS - LV V1 VTI: 24.4 CM
BH CV ECHO MEAS - LVIDD: 5 CM
BH CV ECHO MEAS - LVIDS: 3.6 CM
BH CV ECHO MEAS - LVLD AP2: 9.6 CM
BH CV ECHO MEAS - LVLD AP4: 9.6 CM
BH CV ECHO MEAS - LVLS AP2: 7.6 CM
BH CV ECHO MEAS - LVLS AP4: 7.8 CM
BH CV ECHO MEAS - LVOT AREA (M): 3.1 CM^2
BH CV ECHO MEAS - LVOT AREA: 3.2 CM^2
BH CV ECHO MEAS - LVOT DIAM: 2 CM
BH CV ECHO MEAS - LVPWD: 1.1 CM
BH CV ECHO MEAS - MED PEAK E' VEL: 8.9 CM/SEC
BH CV ECHO MEAS - MEDIAL E/E' RATIO: 11.4
BH CV ECHO MEAS - MV A MAX VEL: 55.3 CM/SEC
BH CV ECHO MEAS - MV DEC TIME: 0.16 SEC
BH CV ECHO MEAS - MV E MAX VEL: 102.2 CM/SEC
BH CV ECHO MEAS - MV E/A: 1.8
BH CV ECHO MEAS - PA ACC SLOPE: 608.4 CM/SEC^2
BH CV ECHO MEAS - PA ACC TIME: 0.12 SEC
BH CV ECHO MEAS - PA MAX PG: 4.9 MMHG
BH CV ECHO MEAS - PA PR(ACCEL): 23.1 MMHG
BH CV ECHO MEAS - PA V2 MAX: 110.8 CM/SEC
BH CV ECHO MEAS - RAP SYSTOLE: 8 MMHG
BH CV ECHO MEAS - RVSP: 13 MMHG
BH CV ECHO MEAS - SI(AO): 100.3 ML/M^2
BH CV ECHO MEAS - SI(CUBED): 32.6 ML/M^2
BH CV ECHO MEAS - SI(LVOT): 31.4 ML/M^2
BH CV ECHO MEAS - SI(MOD-SP2): 39.6 ML/M^2
BH CV ECHO MEAS - SI(MOD-SP4): 38.9 ML/M^2
BH CV ECHO MEAS - SI(TEICH): 26.5 ML/M^2
BH CV ECHO MEAS - SV(AO): 252.9 ML
BH CV ECHO MEAS - SV(CUBED): 82.2 ML
BH CV ECHO MEAS - SV(LVOT): 79.2 ML
BH CV ECHO MEAS - SV(MOD-SP2): 100 ML
BH CV ECHO MEAS - SV(MOD-SP4): 98 ML
BH CV ECHO MEAS - SV(TEICH): 66.9 ML
BH CV ECHO MEAS - TAPSE (>1.6): 3.4 CM2
BH CV ECHO MEAS - TR MAX PG: 5 MMHG
BH CV ECHO MEAS - TR MAX VEL: 107.2 CM/SEC
BH CV ECHO MEASUREMENTS AVERAGE E/E' RATIO: 10.43
BH CV VAS BP LEFT ARM: NORMAL MMHG
BH CV XLRA - RV BASE: 4.1 CM
BH CV XLRA - RV LENGTH: 7.8 CM
BH CV XLRA - RV MID: 2.3 CM
BH CV XLRA - TDI S': 16.3 CM/SEC
LEFT ATRIUM VOLUME INDEX: 34.9 ML/M^2
LEFT ATRIUM VOLUME: 88 CM3
LV EF 2D ECHO EST: 60 %

## 2018-10-23 PROCEDURE — 93306 TTE W/DOPPLER COMPLETE: CPT | Performed by: INTERNAL MEDICINE

## 2018-10-23 PROCEDURE — 93306 TTE W/DOPPLER COMPLETE: CPT

## 2018-10-30 ENCOUNTER — TELEPHONE (OUTPATIENT)
Dept: CARDIOLOGY | Facility: CLINIC | Age: 58
End: 2018-10-30

## 2018-10-30 NOTE — TELEPHONE ENCOUNTER
The patient called requesting return call.  LVM requesting that he call back so that I can assist him.  No other details were left in his message regarding what he needs.  Will await return call.

## 2018-11-14 RX ORDER — FINASTERIDE 1 MG/1
TABLET, FILM COATED ORAL
Qty: 90 TABLET | Refills: 0 | Status: SHIPPED | OUTPATIENT
Start: 2018-11-14 | End: 2019-01-20 | Stop reason: SDUPTHER

## 2018-11-21 RX ORDER — LEVOTHYROXINE SODIUM 88 UG/1
TABLET ORAL
Qty: 90 TABLET | Refills: 0 | Status: SHIPPED | OUTPATIENT
Start: 2018-11-21 | End: 2019-01-20 | Stop reason: SDUPTHER

## 2018-11-26 RX ORDER — FUROSEMIDE 40 MG/1
TABLET ORAL
Qty: 30 TABLET | Refills: 0 | Status: SHIPPED | OUTPATIENT
Start: 2018-11-26 | End: 2019-01-06 | Stop reason: SDUPTHER

## 2018-12-22 DIAGNOSIS — Z12.11 SCREENING FOR COLON CANCER: Primary | ICD-10-CM

## 2019-01-07 RX ORDER — FUROSEMIDE 40 MG/1
TABLET ORAL
Qty: 30 TABLET | Refills: 0 | Status: SHIPPED | OUTPATIENT
Start: 2019-01-07 | End: 2019-01-20 | Stop reason: SDUPTHER

## 2019-01-14 ENCOUNTER — TELEPHONE (OUTPATIENT)
Dept: CARDIOLOGY | Facility: CLINIC | Age: 59
End: 2019-01-14

## 2019-01-14 ENCOUNTER — OFFICE VISIT (OUTPATIENT)
Dept: INTERNAL MEDICINE | Facility: CLINIC | Age: 59
End: 2019-01-14

## 2019-01-14 VITALS
HEIGHT: 74 IN | DIASTOLIC BLOOD PRESSURE: 72 MMHG | TEMPERATURE: 97.4 F | BODY MASS INDEX: 38.17 KG/M2 | SYSTOLIC BLOOD PRESSURE: 116 MMHG | WEIGHT: 297.4 LBS

## 2019-01-14 DIAGNOSIS — Z23 NEED FOR HEPATITIS A IMMUNIZATION: ICD-10-CM

## 2019-01-14 DIAGNOSIS — I48.0 PAROXYSMAL ATRIAL FIBRILLATION (HCC): ICD-10-CM

## 2019-01-14 DIAGNOSIS — E66.01 CLASS 2 SEVERE OBESITY WITH SERIOUS COMORBIDITY AND BODY MASS INDEX (BMI) OF 36.0 TO 36.9 IN ADULT, UNSPECIFIED OBESITY TYPE (HCC): ICD-10-CM

## 2019-01-14 DIAGNOSIS — I10 ESSENTIAL HYPERTENSION: ICD-10-CM

## 2019-01-14 DIAGNOSIS — Z23 NEED FOR IMMUNIZATION AGAINST INFLUENZA: ICD-10-CM

## 2019-01-14 DIAGNOSIS — E03.9 ACQUIRED HYPOTHYROIDISM: Primary | ICD-10-CM

## 2019-01-14 LAB
ALBUMIN SERPL-MCNC: 4.4 G/DL (ref 3.2–4.8)
ALBUMIN/GLOB SERPL: 2.2 G/DL (ref 1.5–2.5)
ALP SERPL-CCNC: 55 U/L (ref 25–100)
ALT SERPL W P-5'-P-CCNC: 34 U/L (ref 7–40)
ANION GAP SERPL CALCULATED.3IONS-SCNC: 8 MMOL/L (ref 3–11)
ARTICHOKE IGE QN: 163 MG/DL (ref 0–130)
AST SERPL-CCNC: 21 U/L (ref 0–33)
BASOPHILS # BLD AUTO: 0.03 10*3/MM3 (ref 0–0.2)
BASOPHILS NFR BLD AUTO: 0.5 % (ref 0–1)
BILIRUB SERPL-MCNC: 0.8 MG/DL (ref 0.3–1.2)
BUN BLD-MCNC: 20 MG/DL (ref 9–23)
BUN/CREAT SERPL: 18.3 (ref 7–25)
CALCIUM SPEC-SCNC: 9.5 MG/DL (ref 8.7–10.4)
CHLORIDE SERPL-SCNC: 104 MMOL/L (ref 99–109)
CHOLEST SERPL-MCNC: 251 MG/DL (ref 0–200)
CO2 SERPL-SCNC: 29 MMOL/L (ref 20–31)
CREAT BLD-MCNC: 1.09 MG/DL (ref 0.6–1.3)
DEPRECATED RDW RBC AUTO: 48.8 FL (ref 37–54)
EOSINOPHIL # BLD AUTO: 0.2 10*3/MM3 (ref 0–0.3)
EOSINOPHIL NFR BLD AUTO: 3.3 % (ref 0–3)
ERYTHROCYTE [DISTWIDTH] IN BLOOD BY AUTOMATED COUNT: 14.1 % (ref 11.3–14.5)
GFR SERPL CREATININE-BSD FRML MDRD: 69 ML/MIN/1.73
GLOBULIN UR ELPH-MCNC: 2 GM/DL
GLUCOSE BLD-MCNC: 103 MG/DL (ref 70–100)
HCT VFR BLD AUTO: 47.2 % (ref 38.9–50.9)
HDLC SERPL-MCNC: 68 MG/DL (ref 40–60)
HGB BLD-MCNC: 15.5 G/DL (ref 13.1–17.5)
IMM GRANULOCYTES # BLD AUTO: 0.01 10*3/MM3 (ref 0–0.03)
IMM GRANULOCYTES NFR BLD AUTO: 0.2 % (ref 0–0.6)
LYMPHOCYTES # BLD AUTO: 1.26 10*3/MM3 (ref 0.6–4.8)
LYMPHOCYTES NFR BLD AUTO: 20.7 % (ref 24–44)
MCH RBC QN AUTO: 31.1 PG (ref 27–31)
MCHC RBC AUTO-ENTMCNC: 32.8 G/DL (ref 32–36)
MCV RBC AUTO: 94.6 FL (ref 80–99)
MONOCYTES # BLD AUTO: 0.6 10*3/MM3 (ref 0–1)
MONOCYTES NFR BLD AUTO: 9.9 % (ref 0–12)
NEUTROPHILS # BLD AUTO: 3.99 10*3/MM3 (ref 1.5–8.3)
NEUTROPHILS NFR BLD AUTO: 65.4 % (ref 41–71)
PLATELET # BLD AUTO: 301 10*3/MM3 (ref 150–450)
PMV BLD AUTO: 10.5 FL (ref 6–12)
POTASSIUM BLD-SCNC: 4.3 MMOL/L (ref 3.5–5.5)
PROT SERPL-MCNC: 6.4 G/DL (ref 5.7–8.2)
RBC # BLD AUTO: 4.99 10*6/MM3 (ref 4.2–5.76)
SODIUM BLD-SCNC: 141 MMOL/L (ref 132–146)
TRIGL SERPL-MCNC: 124 MG/DL (ref 0–150)
TSH SERPL DL<=0.05 MIU/L-ACNC: 3.11 MIU/ML (ref 0.35–5.35)
WBC NRBC COR # BLD: 6.09 10*3/MM3 (ref 3.5–10.8)

## 2019-01-14 PROCEDURE — 84443 ASSAY THYROID STIM HORMONE: CPT | Performed by: INTERNAL MEDICINE

## 2019-01-14 PROCEDURE — 80053 COMPREHEN METABOLIC PANEL: CPT | Performed by: INTERNAL MEDICINE

## 2019-01-14 PROCEDURE — 90472 IMMUNIZATION ADMIN EACH ADD: CPT | Performed by: INTERNAL MEDICINE

## 2019-01-14 PROCEDURE — 90674 CCIIV4 VAC NO PRSV 0.5 ML IM: CPT | Performed by: INTERNAL MEDICINE

## 2019-01-14 PROCEDURE — 90632 HEPA VACCINE ADULT IM: CPT | Performed by: INTERNAL MEDICINE

## 2019-01-14 PROCEDURE — 99214 OFFICE O/P EST MOD 30 MIN: CPT | Performed by: INTERNAL MEDICINE

## 2019-01-14 PROCEDURE — 80061 LIPID PANEL: CPT | Performed by: INTERNAL MEDICINE

## 2019-01-14 PROCEDURE — 85025 COMPLETE CBC W/AUTO DIFF WBC: CPT | Performed by: INTERNAL MEDICINE

## 2019-01-14 PROCEDURE — 90471 IMMUNIZATION ADMIN: CPT | Performed by: INTERNAL MEDICINE

## 2019-01-14 RX ORDER — NAPROXEN SODIUM 220 MG
220 TABLET ORAL 2 TIMES DAILY PRN
COMMUNITY
End: 2020-10-14

## 2019-01-14 NOTE — TELEPHONE ENCOUNTER
Pt called regarding stopping Eliquis prior to colonoscopy. Per MJS, patient okay to hold Eliquis 48 hours before. Pt verbalizes understanding.

## 2019-01-20 RX ORDER — FINASTERIDE 1 MG/1
1 TABLET, FILM COATED ORAL DAILY
Qty: 90 TABLET | Refills: 1 | Status: SHIPPED | OUTPATIENT
Start: 2019-01-20 | End: 2020-06-08

## 2019-01-20 RX ORDER — FUROSEMIDE 40 MG/1
TABLET ORAL
Qty: 90 TABLET | Refills: 1 | Status: SHIPPED | OUTPATIENT
Start: 2019-01-20 | End: 2019-09-11 | Stop reason: SDUPTHER

## 2019-01-20 RX ORDER — LEVOTHYROXINE SODIUM 88 UG/1
88 TABLET ORAL DAILY
Qty: 90 TABLET | Refills: 1 | Status: SHIPPED | OUTPATIENT
Start: 2019-01-20 | End: 2019-08-10 | Stop reason: SDUPTHER

## 2019-03-18 ENCOUNTER — OFFICE VISIT (OUTPATIENT)
Dept: CARDIOLOGY | Facility: CLINIC | Age: 59
End: 2019-03-18

## 2019-03-18 VITALS
DIASTOLIC BLOOD PRESSURE: 80 MMHG | SYSTOLIC BLOOD PRESSURE: 110 MMHG | WEIGHT: 300.2 LBS | HEIGHT: 74 IN | BODY MASS INDEX: 38.53 KG/M2 | HEART RATE: 60 BPM

## 2019-03-18 DIAGNOSIS — I20.8 ANGINAL EQUIVALENT (HCC): ICD-10-CM

## 2019-03-18 DIAGNOSIS — I48.0 PAROXYSMAL ATRIAL FIBRILLATION (HCC): Primary | ICD-10-CM

## 2019-03-18 DIAGNOSIS — I10 ESSENTIAL HYPERTENSION: ICD-10-CM

## 2019-03-18 DIAGNOSIS — R06.02 SHORTNESS OF BREATH: ICD-10-CM

## 2019-03-18 DIAGNOSIS — R60.0 BILATERAL LOWER EXTREMITY EDEMA: ICD-10-CM

## 2019-03-18 PROCEDURE — 99214 OFFICE O/P EST MOD 30 MIN: CPT | Performed by: INTERNAL MEDICINE

## 2019-03-18 NOTE — PROGRESS NOTES
[facility] AT [address], [address]  [place], [place], [address] [phone]  [facility]           OUTPATIENT CLINIC FOLLOW UP NOTE    Patient Care Team:  Patient Care Team:  Gail Chandler MD as PCP - General  Gail Chandler MD as PCP - Family Medicine  Alex Zaragoza MD as Consulting Physician (Psychiatry)    Subjective:   Reason for consultation:   Chief Complaint   Patient presents with   • Atrial Fibrillation       HPI:    Katharine Lantigua is a 58 y.o. male.    The patient has a history of paroxysmal atrial fibrillation status post failed cardioversion, failed flecainide and dofetilide therapy, status post successful cryoablation of the pulmonary veins 11/2015 Dr Silveira, hypertension, recent orthostasis.  The patient presents for early follow-up.    Since the patient was last seen he reports that he has had continued ongoing shortness of breath and lower extremity edema.  Dyspnea is worse with exertion and sometimes associated with chest tightness.  He has only been taking his Lasix typically every other day.  His blood pressure has been improved and is typically in the 130s systolic.  He does note some lightheadedness when standing abruptly.  He also notes that he may possibly undergoing a left hip replacement in the near future.      PFSH:  Patient Active Problem List   Diagnosis   • Paroxysmal atrial fibrillation (CMS/HCC)   • Obesity   • TACO on CPAP   • Acquired hypothyroidism   • Gout   • Insomnia   • Essential hypertension   • Pain in joint involving ankle and foot   • Ankle mass, right   • Venous stasis   • Plantar wart of right foot   • Nerve sheath tumor   • Shortness of breath   • Bilateral lower extremity edema         Current Outpatient Medications:   •  buPROPion XL (WELLBUTRIN XL) 300 MG 24 hr tablet, Take 450 mg by mouth Daily., Disp: , Rfl:   •  ELIQUIS 5 MG tablet tablet, take 1 tablet by mouth every 12 hours, Disp: 180 tablet,  "Rfl: 3  •  furosemide (LASIX) 40 MG tablet, 1 qd prn swelling, Disp: 90 tablet, Rfl: 1  •  levothyroxine (SYNTHROID, LEVOTHROID) 88 MCG tablet, Take 1 tablet by mouth Daily., Disp: 90 tablet, Rfl: 1  •  losartan (COZAAR) 100 MG tablet, take 1 tablet by mouth once daily, Disp: 90 tablet, Rfl: 3  •  metoprolol tartrate (LOPRESSOR) 25 MG tablet, take 1 tablet by mouth twice a day, Disp: 90 tablet, Rfl: 3  •  naproxen sodium (ALEVE) 220 MG tablet, Take 220 mg by mouth 2 (Two) Times a Day As Needed., Disp: , Rfl:   •  tamsulosin (FLOMAX) 0.4 MG capsule 24 hr capsule, tamsulosin 0.4 mg capsule  Take 1 capsule every day by oral route., Disp: , Rfl:   •  finasteride (PROPECIA) 1 MG tablet, Take 1 tablet by mouth Daily., Disp: 90 tablet, Rfl: 1  •  sildenafil (VIAGRA) 100 MG tablet, sildenafil 100 mg tablet  Take 1 tablet every day by oral route as needed., Disp: , Rfl:     No Known Allergies     reports that  has never smoked. he has never used smokeless tobacco.    Family History   Problem Relation Age of Onset   • Hypothyroidism Mother          in 80's   • Cancer Mother    • Cancer Father    • Diabetes type II Father    • Hypertension Father    • Coronary artery disease Father         60;s   • Obesity Other    • Coronary artery disease Other        Review of Systems:  Positive for shortness of breath and lower extremity edema.  Positive for chest tightness  Negative for orthopnea, PND,palpitations, lightheadedness, syncope.       Objective:   Blood pressure 110/80, pulse 60, height 188 cm (74\"), weight (!) 136 kg (300 lb 3.2 oz).  CONSTITUTIONAL: No acute distress, normal affect  RESPIRATORY: Normal effort. Clear to auscultation bilaterally without wheezing or rales  CARDIOVASCULAR: Carotids with normal upstrokes without bruits.  Regular rate and rhythm with normal S1 and S2. Without murmur, gallop or rub.  PERIPHERAL VASCULAR: Normal radial pulses bilaterally. There is pedal edema.      Labs:  Lab Results "   Component Value Date    ALT 34 01/14/2019    AST 21 01/14/2019     Lab Results   Component Value Date    CHOL 251 (H) 01/14/2019    TRIG 124 01/14/2019    HDL 68 (H) 01/14/2019    CREATININE 1.09 01/14/2019       Diagnostic Data:    Procedures    TTE 10/2018  Interpretation Summary   · Left ventricular systolic function is normal. Estimated EF = 60%.  · Left ventricular wall thickness is consistent with mild concentric hypertrophy.  · Left atrial volume is mildly increased.     Assessment and Plan:   Katharine was seen today for hypertension and atrial fibrillation.    Diagnoses and all orders for this visit:    Paroxysmal atrial fibrillation  Essential hypertension  TACO   Obesity due to excess calories, unspecified obesity severity  -Status post pulmonary vein cryoablation in 11/2015 by Dr. Silveira, remains in a regular rhythm today.  Continue metoprolol and apixaban  -Continue metoprolol, losartan for blood pressure.    - ASCVD 5.5% as of 1/2019 labs, discussed the risk and benefits of being on a statin.  Patient wishes to hold off for now.    Shortness of Breath  Lower Extremity Edema  Chest tightness  -Etiology unclear, cannot entirely rule out angina.  Likely multifactorial including weight gain, left hip limitations, lack of exercise due to left hip problems.   -Continue Lasix at 20 mg but increase to daily.  -Exercise nuclear stress test.  -PFTs  -Recommended lifestyle and risk factor modification, weight loss, and to continue his orthopedic workup for his left hip arthritis.    - Return in about 6 months (around 9/18/2019).    Scribed for Gabriel Hanna MD by Dilma Vance, APRN   3/18/2019  4:51 PM    I, Gabriel Hanna MD, personally performed the services as scribed by the above named individual. I have made any necessary edits and it is both accurate and complete.     Gabriel Hanna MD, MSc, [facility]  Interventional Cardiology  [facility] at [facility]

## 2019-04-07 NOTE — PROGRESS NOTES
"History of Present Illness   Here for a physical    Exercise: he has not been able to walk or do stationary bike because of the hip pain  Diet: not been great. Is doing low carb - high protein, low carb. No vitamins.  ETOH - 8-10 glasses of wine/week    He saw Dr. Hanna in cardiology for his follow-up last month.  Because of the shortness of breath, he has ordered PFTs and a stress test.  These are scheduled for later this month    Hypothyroid-we did do thyroid labs in January and they were normal    Hyperlipidemia-his LDL was 163 in January.    TACO - he picked up device and will start that    L hip pain - probably will have L hip replacement this summer.    The following portions of the patient's history were reviewed and updated as appropriate: allergies, current medications, past family history, past medical history, past social history, past surgical history and problem list.    Review of Systems   Constitutional: Positive for activity change and unexpected weight gain. Negative for unexpected weight loss.   Respiratory: Positive for shortness of breath.    Cardiovascular: Negative for chest pain.   Gastrointestinal: Positive for constipation (having to strain). Negative for diarrhea.   Genitourinary: Positive for nocturia (3-4x) and erectile dysfunction.   Musculoskeletal: Positive for arthralgias and gait problem.   Skin:        Varicose veins-she did see Dr. Corral last year and did try to wear compression socks but were very uncomfortable for him.   Allergic/Immunologic: Negative for immunocompromised state.   Psychiatric/Behavioral: Positive for stress.         Objective   /82   Pulse 66   Temp 97.9 °F (36.6 °C)   Ht 188 cm (74\")   Wt (!) 137 kg (302 lb)   SpO2 98%   BMI 38.77 kg/m²   Physical Exam   Physical Exam   Constitutional: He is oriented to person, place, and time. He appears well-developed and well-nourished. No distress.   HENT:   Head: Normocephalic and atraumatic.   Right Ear: " External ear normal.   Left Ear: External ear normal.   Nose: Nose normal.   Mouth/Throat: Oropharynx is clear and moist. No oropharyngeal exudate.   Eyes: Conjunctivae and EOM are normal. Pupils are equal, round, and reactive to light. Right eye exhibits no discharge. Left eye exhibits no discharge. No scleral icterus.   Neck: Normal range of motion. Neck supple. No thyromegaly present.   Cardiovascular: Normal rate, regular rhythm, normal heart sounds and intact distal pulses.   No murmur heard.  Pulmonary/Chest: Effort normal and breath sounds normal. No stridor. No respiratory distress. He has no wheezes. He has no rales.   Abdominal: Soft. Bowel sounds are normal. He exhibits no distension and no mass. There is no tenderness. There is no rebound and no guarding.   Musculoskeletal: Normal range of motion. He exhibits no edema or deformity.   Lymphadenopathy:     He has no cervical adenopathy.   Neurological: He is alert and oriented to person, place, and time. He displays normal reflexes. Coordination normal.   Skin: Skin is warm and dry. No rash noted. He is not diaphoretic. No erythema. No pallor.   Varicose veins bilaterally   Psychiatric: He has a normal mood and affect. His behavior is normal. Judgment and thought content normal.   Nursing note and vitals reviewed.        Assessment/Plan   Katharine was seen today for annual exam.    Diagnoses and all orders for this visit:    Routine general medical examination at a health care facility    Annual physical exam  -     POCT urinalysis dipstick, automated        Regular exercise, healthy diet. Sunscreen use encouraged.  Weight loss encouraged.  DT due July 2024  Colon due January 2024  Shingles -he has had shingrex  Hep A-he got first vaccine in January, and will be due for second vaccine in July  He saw Dr. Carias at [facility] in August 2018 and had a PSA of 0.49.

## 2019-04-10 ENCOUNTER — OFFICE VISIT (OUTPATIENT)
Dept: INTERNAL MEDICINE | Facility: CLINIC | Age: 59
End: 2019-04-10

## 2019-04-10 VITALS
SYSTOLIC BLOOD PRESSURE: 118 MMHG | HEIGHT: 74 IN | DIASTOLIC BLOOD PRESSURE: 82 MMHG | HEART RATE: 66 BPM | WEIGHT: 302 LBS | OXYGEN SATURATION: 98 % | TEMPERATURE: 97.9 F | BODY MASS INDEX: 38.76 KG/M2

## 2019-04-10 DIAGNOSIS — Z00.00 ANNUAL PHYSICAL EXAM: ICD-10-CM

## 2019-04-10 DIAGNOSIS — Z00.00 ROUTINE GENERAL MEDICAL EXAMINATION AT A HEALTH CARE FACILITY: Primary | ICD-10-CM

## 2019-04-10 LAB
BILIRUB BLD-MCNC: NEGATIVE MG/DL
CLARITY, POC: CLEAR
COLOR UR: YELLOW
GLUCOSE UR STRIP-MCNC: NEGATIVE MG/DL
KETONES UR QL: NEGATIVE
LEUKOCYTE EST, POC: NEGATIVE
NITRITE UR-MCNC: NEGATIVE MG/ML
PH UR: 5.5 [PH] (ref 5–8)
PROT UR STRIP-MCNC: NEGATIVE MG/DL
RBC # UR STRIP: NEGATIVE /UL
SP GR UR: 1.03 (ref 1–1.03)
UROBILINOGEN UR QL: NORMAL

## 2019-04-10 PROCEDURE — 99396 PREV VISIT EST AGE 40-64: CPT | Performed by: INTERNAL MEDICINE

## 2019-04-10 PROCEDURE — 81003 URINALYSIS AUTO W/O SCOPE: CPT | Performed by: INTERNAL MEDICINE

## 2019-04-11 ENCOUNTER — APPOINTMENT (OUTPATIENT)
Dept: CARDIOLOGY | Facility: HOSPITAL | Age: 59
End: 2019-04-11

## 2019-04-30 ENCOUNTER — HOSPITAL ENCOUNTER (OUTPATIENT)
Dept: CARDIOLOGY | Facility: HOSPITAL | Age: 59
Discharge: HOME | End: 2019-04-30

## 2019-04-30 VITALS
SYSTOLIC BLOOD PRESSURE: 130 MMHG | DIASTOLIC BLOOD PRESSURE: 98 MMHG | WEIGHT: 302.03 LBS | HEIGHT: 74 IN | BODY MASS INDEX: 38.76 KG/M2 | HEART RATE: 73 BPM

## 2019-04-30 DIAGNOSIS — I20.8 ANGINAL EQUIVALENT (HCC): ICD-10-CM

## 2019-04-30 DIAGNOSIS — R06.02 SHORTNESS OF BREATH: ICD-10-CM

## 2019-04-30 LAB
BH CV STRESS BP STAGE 1: NORMAL
BH CV STRESS BP STAGE 2: NORMAL
BH CV STRESS BP STAGE 3: NORMAL
BH CV STRESS DURATION MIN STAGE 1: 3
BH CV STRESS DURATION MIN STAGE 2: 3
BH CV STRESS DURATION MIN STAGE 3: 2
BH CV STRESS DURATION SEC STAGE 1: 0
BH CV STRESS DURATION SEC STAGE 2: 0
BH CV STRESS DURATION SEC STAGE 3: 20
BH CV STRESS GRADE STAGE 1: 10
BH CV STRESS GRADE STAGE 2: 12
BH CV STRESS GRADE STAGE 3: 14
BH CV STRESS HR STAGE 1: 101
BH CV STRESS HR STAGE 2: 123
BH CV STRESS HR STAGE 3: 141
BH CV STRESS METS STAGE 1: 5
BH CV STRESS METS STAGE 2: 7.5
BH CV STRESS METS STAGE 3: 10
BH CV STRESS O2 STAGE 1: 97
BH CV STRESS O2 STAGE 2: 96
BH CV STRESS O2 STAGE 3: 94
BH CV STRESS PROTOCOL 1: NORMAL
BH CV STRESS RECOVERY BP: NORMAL MMHG
BH CV STRESS RECOVERY HR: 96 BPM
BH CV STRESS SPEED STAGE 1: 1.7
BH CV STRESS SPEED STAGE 2: 2.5
BH CV STRESS SPEED STAGE 3: 3.4
BH CV STRESS STAGE 1: 1
BH CV STRESS STAGE 2: 2
BH CV STRESS STAGE 3: 3
LV EF NUC BP: 69 %
MAXIMAL PREDICTED HEART RATE: 160 BPM
PERCENT MAX PREDICTED HR: 88.13 %
STRESS BASELINE BP: NORMAL MMHG
STRESS BASELINE HR: 74 BPM
STRESS O2 SAT REST: 96 %
STRESS PERCENT HR: 104 %
STRESS POST ESTIMATED WORKLOAD: 9.9 METS
STRESS POST EXERCISE DUR MIN: 8 MIN
STRESS POST EXERCISE DUR SEC: 20 SEC
STRESS POST O2 SAT PEAK: 94 %
STRESS POST PEAK BP: NORMAL MMHG
STRESS POST PEAK HR: 141 BPM
STRESS TARGET HR: 136 BPM

## 2019-04-30 PROCEDURE — 0 TECHNETIUM SESTAMIBI: Performed by: NURSE PRACTITIONER

## 2019-04-30 PROCEDURE — A9500 TC99M SESTAMIBI: HCPCS | Performed by: NURSE PRACTITIONER

## 2019-04-30 PROCEDURE — 93017 CV STRESS TEST TRACING ONLY: CPT

## 2019-04-30 PROCEDURE — 93018 CV STRESS TEST I&R ONLY: CPT | Performed by: INTERNAL MEDICINE

## 2019-04-30 PROCEDURE — 78452 HT MUSCLE IMAGE SPECT MULT: CPT | Performed by: INTERNAL MEDICINE

## 2019-04-30 PROCEDURE — 78452 HT MUSCLE IMAGE SPECT MULT: CPT

## 2019-04-30 RX ADMIN — TECHNETIUM TC 99M SESTAMIBI 1 DOSE: 1 INJECTION INTRAVENOUS at 09:20

## 2019-04-30 RX ADMIN — TECHNETIUM TC 99M SESTAMIBI 1 DOSE: 1 INJECTION INTRAVENOUS at 07:35

## 2019-05-01 ENCOUNTER — TELEPHONE (OUTPATIENT)
Dept: CARDIOLOGY | Facility: CLINIC | Age: 59
End: 2019-05-01

## 2019-05-01 NOTE — TELEPHONE ENCOUNTER
Patient called to see if we had the results from his stress test yet. I advised I haven't received the official results yet, but just looking at the stress test I advised it is a low risk study but I would call with official results when [facility] sends them to me. Pt agreeable to plan, no further questions at this time.

## 2019-05-02 ENCOUNTER — TELEPHONE (OUTPATIENT)
Dept: CARDIOLOGY | Facility: CLINIC | Age: 59
End: 2019-05-02

## 2019-05-02 NOTE — TELEPHONE ENCOUNTER
----- Message from DEAN Stark sent at 5/2/2019  8:40 AM EDT -----  Can you let the patient know that his stress test was normal. Continue with dietary and lifestyle modifications as discussed during his office visit.

## 2019-07-10 PROBLEM — E78.00 PURE HYPERCHOLESTEROLEMIA: Status: ACTIVE | Noted: 2019-07-10

## 2019-07-15 RX ORDER — APIXABAN 5 MG/1
TABLET, FILM COATED ORAL
Qty: 180 TABLET | Refills: 3 | Status: SHIPPED | OUTPATIENT
Start: 2019-07-15 | End: 2020-07-20 | Stop reason: SDUPTHER

## 2019-08-04 NOTE — PROGRESS NOTES
Subjective   Katharine Lantigua is a 59 y.o. male.     History of Present Illness     Here for f/u on:    Hypothyroid -feels okay overall.  He is trying to lose weight     Hyperlipidemia -  last check 6m ago. Dr Hanna had discussed statins w/ him last visit, but patient had declined to start.  He ate early this morning but nothing and lasts 8 hours     Shortness of breath-he did have stress test this spring which was normal.  His EF was 69%.  Dr. Hanna had also ordered PFTs but it looks like patient did not show for this - he says he was not told he had appt. he does still continue to feel shortness of breath frequently    TACO - he tried mouth guard - using some but has been difficult to get used to     L hip pain - he sees Dr Mtz and was supposed to have L hip replacement but he is trying to lose weight first to see if he has much improvement    Morbid obesity -wt has got up to 315lbs -  he has lost about 20 lbs so far- he is doing HMR over the last 5 weeks and has lost weight    htn - has felt more lightheaded on and off over last 6-7 months, but seems worse recently. Notices it getting up out of chairs. He is on flomax and propecia, as well as lasix, lopresor and cozaar  BP was 119/65 and 123/74 last week but has not checked very regularly.  He uses lasix 3-4 days a week. Helps some w/ the swelling    L ankle has been swelling for a year. He has tried compression stockings but make his legs worse, r leg not as bad as the left leg.    The following portions of the patient's history were reviewed and updated as appropriate: allergies, current medications, past family history, past medical history, past social history, past surgical history and problem list.    Review of Systems   Constitutional: Positive for activity change (doing some pool exercises) and unexpected weight loss. Negative for unexpected weight gain.   Respiratory: Positive for shortness of breath.    Cardiovascular: Positive for leg swelling.  "  Allergic/Immunologic: Negative for immunocompromised state.       Objective   BP 98/62 (BP Location: Left arm)   Pulse 59   Temp 98.1 °F (36.7 °C) (Temporal)   Ht 188 cm (74\")   Wt 130 kg (286 lb 12.8 oz)   SpO2 98%   BMI 36.82 kg/m²   Physical Exam   Constitutional: He is oriented to person, place, and time. He appears well-developed and well-nourished. No distress.   HENT:   Head: Normocephalic and atraumatic.   Eyes: Conjunctivae and EOM are normal. Pupils are equal, round, and reactive to light. Right eye exhibits no discharge. Left eye exhibits no discharge.   Neck: Normal range of motion. Neck supple.   Cardiovascular: Normal rate and regular rhythm.   Pulmonary/Chest: Effort normal and breath sounds normal.   Musculoskeletal: He exhibits edema (trace left LE today).   Neurological: He is alert and oriented to person, place, and time. No cranial nerve deficit.   Skin: Skin is warm and dry. No rash noted. He is not diaphoretic.   Psychiatric: He has a normal mood and affect. His behavior is normal. Judgment and thought content normal.   Nursing note and vitals reviewed.  recheck 94/62    Total visit time was 40 minutes, over half of which was spent counseling and formulating plans on multiple medical issues covered today    Assessment/Plan   Katharine was seen today for hypothyroidism, dizziness and edema.    Diagnoses and all orders for this visit:    Essential hypertension -today's blood pressure reading is quite low.  We will go ahead and have him stop the finasteride.  He will check his blood pressure every day this week and call us with the readings in 4 days.  If readings are still very low, we will go ahead and cut back further on some of his other medications.  Encouraged him to try to stay well-hydrated.    Pure hypercholesterolemia -check today  -     Comprehensive Metabolic Panel  -     Lipid Panel    Shortness of breath -stress test done this year was normal.  We will go ahead and reorder the " PFTs that radiology had wanted him to have done  -     Full Pulmonary Function Test With Bronchodilator; Future    TACO (obstructive sleep apnea) -he will keep trying to lose weight and try to use the mouthguard at night    Acquired hypothyroidism -check today  -     TSH    Need for hepatitis A immunization  -     Hepatitis A Vaccine Adult IM    Left leg swelling  -     Venous w Reflux Lower Extremity - Right/Left CAR; Future            Prev - we will give 2nd hep A today

## 2019-08-05 ENCOUNTER — OFFICE VISIT (OUTPATIENT)
Dept: INTERNAL MEDICINE | Facility: CLINIC | Age: 59
End: 2019-08-05

## 2019-08-05 VITALS
BODY MASS INDEX: 36.81 KG/M2 | TEMPERATURE: 98.1 F | HEART RATE: 59 BPM | WEIGHT: 286.8 LBS | SYSTOLIC BLOOD PRESSURE: 98 MMHG | OXYGEN SATURATION: 98 % | DIASTOLIC BLOOD PRESSURE: 62 MMHG | HEIGHT: 74 IN

## 2019-08-05 DIAGNOSIS — M79.89 LEFT LEG SWELLING: ICD-10-CM

## 2019-08-05 DIAGNOSIS — R06.02 SHORTNESS OF BREATH: ICD-10-CM

## 2019-08-05 DIAGNOSIS — E03.9 ACQUIRED HYPOTHYROIDISM: ICD-10-CM

## 2019-08-05 DIAGNOSIS — Z23 NEED FOR HEPATITIS A IMMUNIZATION: ICD-10-CM

## 2019-08-05 DIAGNOSIS — I95.2 HYPOTENSION DUE TO DRUGS: ICD-10-CM

## 2019-08-05 DIAGNOSIS — G47.33 OSA (OBSTRUCTIVE SLEEP APNEA): ICD-10-CM

## 2019-08-05 DIAGNOSIS — E78.00 PURE HYPERCHOLESTEROLEMIA: ICD-10-CM

## 2019-08-05 DIAGNOSIS — I10 ESSENTIAL HYPERTENSION: Primary | ICD-10-CM

## 2019-08-05 PROCEDURE — 80053 COMPREHEN METABOLIC PANEL: CPT | Performed by: INTERNAL MEDICINE

## 2019-08-05 PROCEDURE — 84443 ASSAY THYROID STIM HORMONE: CPT | Performed by: INTERNAL MEDICINE

## 2019-08-05 PROCEDURE — 80061 LIPID PANEL: CPT | Performed by: INTERNAL MEDICINE

## 2019-08-05 PROCEDURE — 90471 IMMUNIZATION ADMIN: CPT | Performed by: INTERNAL MEDICINE

## 2019-08-05 PROCEDURE — 90632 HEPA VACCINE ADULT IM: CPT | Performed by: INTERNAL MEDICINE

## 2019-08-05 PROCEDURE — 99215 OFFICE O/P EST HI 40 MIN: CPT | Performed by: INTERNAL MEDICINE

## 2019-08-06 ENCOUNTER — TELEPHONE (OUTPATIENT)
Dept: CARDIOLOGY | Facility: CLINIC | Age: 59
End: 2019-08-06

## 2019-08-06 LAB
ALBUMIN SERPL-MCNC: 4.3 G/DL (ref 3.5–5.2)
ALBUMIN/GLOB SERPL: 2 G/DL
ALP SERPL-CCNC: 47 U/L (ref 39–117)
ALT SERPL W P-5'-P-CCNC: 47 U/L (ref 1–41)
ANION GAP SERPL CALCULATED.3IONS-SCNC: 12.5 MMOL/L (ref 5–15)
AST SERPL-CCNC: 26 U/L (ref 1–40)
BILIRUB SERPL-MCNC: 0.6 MG/DL (ref 0.2–1.2)
BUN BLD-MCNC: 19 MG/DL (ref 8–23)
BUN/CREAT SERPL: 16.5 (ref 7–25)
CALCIUM SPEC-SCNC: 9.4 MG/DL (ref 8.6–10.5)
CHLORIDE SERPL-SCNC: 103 MMOL/L (ref 98–107)
CHOLEST SERPL-MCNC: 177 MG/DL (ref 0–200)
CO2 SERPL-SCNC: 24.5 MMOL/L (ref 22–29)
CREAT BLD-MCNC: 1.15 MG/DL (ref 0.76–1.27)
GFR SERPL CREATININE-BSD FRML MDRD: 65 ML/MIN/1.73
GLOBULIN UR ELPH-MCNC: 2.1 GM/DL
GLUCOSE BLD-MCNC: 101 MG/DL (ref 65–99)
HDLC SERPL-MCNC: 46 MG/DL (ref 40–60)
LDLC SERPL CALC-MCNC: 102 MG/DL (ref 0–100)
LDLC/HDLC SERPL: 2.22 {RATIO}
POTASSIUM BLD-SCNC: 4.4 MMOL/L (ref 3.5–5.2)
PROT SERPL-MCNC: 6.4 G/DL (ref 6–8.5)
SODIUM BLD-SCNC: 140 MMOL/L (ref 136–145)
TRIGL SERPL-MCNC: 144 MG/DL (ref 0–150)
TSH SERPL DL<=0.05 MIU/L-ACNC: 3.3 MIU/ML (ref 0.27–4.2)
VLDLC SERPL-MCNC: 28.8 MG/DL (ref 5–40)

## 2019-08-08 ENCOUNTER — TELEPHONE (OUTPATIENT)
Dept: INTERNAL MEDICINE | Facility: CLINIC | Age: 59
End: 2019-08-08

## 2019-08-08 NOTE — TELEPHONE ENCOUNTER
Patient called about his test results and he was notified they were negative.  He also wanted to let Dr. Chandler know his BP today was 106/77.

## 2019-08-12 RX ORDER — LOSARTAN POTASSIUM 100 MG/1
TABLET ORAL
Qty: 90 TABLET | Refills: 3 | Status: SHIPPED | OUTPATIENT
Start: 2019-08-12 | End: 2019-09-23

## 2019-08-13 RX ORDER — FINASTERIDE 1 MG/1
TABLET, FILM COATED ORAL
Qty: 90 TABLET | Refills: 1 | OUTPATIENT
Start: 2019-08-13

## 2019-08-13 RX ORDER — LEVOTHYROXINE SODIUM 88 UG/1
TABLET ORAL
Qty: 90 TABLET | Refills: 1 | Status: SHIPPED | OUTPATIENT
Start: 2019-08-13 | End: 2020-04-02

## 2019-08-16 ENCOUNTER — TELEPHONE (OUTPATIENT)
Dept: INTERNAL MEDICINE | Facility: CLINIC | Age: 59
End: 2019-08-16

## 2019-08-16 NOTE — TELEPHONE ENCOUNTER
Patient called to report BP readings in order.  136/84, 104/60, 128/76, 131/83, 107/79, 105/78, 120/77, 121/70.  He said he is still having dizzy spells.

## 2019-08-19 NOTE — TELEPHONE ENCOUNTER
BPs look better.  I think we can leave the medications the same right now.  I would continue to hold the Propecia.  He may still be feeling dizzy because of the Flomax that he is on.  This is a common side effect of it.  He should try to get up slowly from chairs and sit on the side of the bed a few minutes before getting up.  Try to make sure he stays well-hydrated as well

## 2019-08-19 NOTE — TELEPHONE ENCOUNTER
I think stopping it will make his prostate symptoms worse, so he may prefer to stay on it and just manage the dizziness with plenty of fluids and getting up slowly.  However, if the dizziness is very bothersome to him, he could stop it and see if he feels better off of it

## 2019-08-19 NOTE — TELEPHONE ENCOUNTER
PN and he is going to try getting up slower and staying hydrated.  If this does not help he will speak to his urologist.

## 2019-09-04 ENCOUNTER — TELEPHONE (OUTPATIENT)
Dept: CARDIOLOGY | Facility: CLINIC | Age: 59
End: 2019-09-04

## 2019-09-11 RX ORDER — FUROSEMIDE 40 MG/1
TABLET ORAL
Qty: 90 TABLET | Refills: 1 | Status: SHIPPED | OUTPATIENT
Start: 2019-09-11 | End: 2020-04-20

## 2019-09-18 ENCOUNTER — TELEPHONE (OUTPATIENT)
Dept: CARDIOLOGY | Facility: CLINIC | Age: 59
End: 2019-09-18

## 2019-09-18 NOTE — TELEPHONE ENCOUNTER
Patient called to report that he has been feeling dizzy, lightheaded. His BP is averaging 100/70 HR 55. Pt has appt with MJS on Monday, 9/23. Pt advised to half Losartan to 50 mg daily, keep record of his BP/HR and keep follow up on Monday. Pt also advised to bring home BP cuff to appt to check against manual. Pt agreeable to plan, all questions answered at this time.

## 2019-09-23 ENCOUNTER — OFFICE VISIT (OUTPATIENT)
Dept: CARDIOLOGY | Facility: CLINIC | Age: 59
End: 2019-09-23

## 2019-09-23 VITALS
DIASTOLIC BLOOD PRESSURE: 78 MMHG | BODY MASS INDEX: 35.42 KG/M2 | SYSTOLIC BLOOD PRESSURE: 118 MMHG | WEIGHT: 276 LBS | HEIGHT: 74 IN | HEART RATE: 56 BPM

## 2019-09-23 DIAGNOSIS — I87.8 VENOUS STASIS: ICD-10-CM

## 2019-09-23 DIAGNOSIS — E78.00 PURE HYPERCHOLESTEROLEMIA: ICD-10-CM

## 2019-09-23 DIAGNOSIS — I48.0 PAROXYSMAL ATRIAL FIBRILLATION (HCC): Primary | ICD-10-CM

## 2019-09-23 PROCEDURE — 99214 OFFICE O/P EST MOD 30 MIN: CPT | Performed by: INTERNAL MEDICINE

## 2019-09-23 RX ORDER — LOSARTAN POTASSIUM 25 MG/1
25 TABLET ORAL DAILY
Qty: 30 TABLET | Refills: 11 | Status: SHIPPED | OUTPATIENT
Start: 2019-09-23 | End: 2020-09-10 | Stop reason: SDUPTHER

## 2019-09-23 NOTE — PROGRESS NOTES
[facility] AT [address], [address]  [place], [place], [address]    [phone]  [facility]           OUTPATIENT CLINIC FOLLOW UP NOTE    Patient Care Team:  Patient Care Team:  Gail Chandler MD as PCP - General  Gail Chandler MD as PCP - Family Medicine  Alex Zaragoza MD as Consulting Physician (Psychiatry)  Andrew Mtz MD as Consulting Physician (Orthopedic Surgery)  Emmanuel Carias MD as Consulting Physician (Urology)  Gabriel Hanna MD as Consulting Physician (Cardiology)    Subjective:   Reason for consultation:   Chief Complaint   Patient presents with   • Atrial Fibrillation       HPI:    Katharine Lantigua is a 59 y.o. male.    The patient has a history of paroxysmal atrial fibrillation status post failed cardioversion, failed flecainide and dofetilide therapy, status post successful cryoablation of the pulmonary veins 11/2015 Dr Silveira, hypertension, recent orthostasis.  The patient presents for early follow-up.    Since his last visit the patient has not had recurrent chest tightness.  He has intermittent left lower extremity swelling, left hip pain.  He intends to have a left hip replacement in the next couple months.  Still trying to eat get his sleep apnea under control.  Goes back to the sleep clinic soon.  No A. fib alarms on his blood pressure cuff monitor.  Denies palpitations.  Still has exertional dyspnea.  Upcoming PFTs.  Blood pressure at home in the low 100s to 110s.  Having some positional lightheadedness.  Decreased his losartan to 50 mg daily but still having some issues.  Taking Lasix daily with some success managing swelling.  Is staying hydrated for the sake of his weight loss program    Review of Systems:  Positive for shortness of breath and lower extremity edema.    Negative for chest tightness, sorthopnea, PND,palpitations, lightheadedness, syncope.     PFSH:  Patient Active Problem List   Diagnosis   •  Paroxysmal atrial fibrillation (CMS/HCC)   • Obesity   • TACO on CPAP   • Acquired hypothyroidism   • Gout   • Insomnia   • Essential hypertension   • Pain in joint involving ankle and foot   • Ankle mass, right   • Venous stasis   • Plantar wart of right foot   • Nerve sheath tumor   • Shortness of breath   • Bilateral lower extremity edema   • Pure hypercholesterolemia         Current Outpatient Medications:   •  buPROPion XL (WELLBUTRIN XL) 300 MG 24 hr tablet, Take 450 mg by mouth Daily., Disp: , Rfl:   •  ELIQUIS 5 MG tablet tablet, take 1 tablet by mouth every 12 hours, Disp: 180 tablet, Rfl: 3  •  furosemide (LASIX) 40 MG tablet, take 1 tablet by mouth once daily if needed for SWELLING, Disp: 90 tablet, Rfl: 1  •  levothyroxine (SYNTHROID, LEVOTHROID) 88 MCG tablet, take 1 tablet by mouth once daily, Disp: 90 tablet, Rfl: 1  •  losartan (COZAAR) 100 MG tablet, take 1 tablet by mouth once daily (Patient taking differently: take 1/2 tablet by mouth once daily), Disp: 90 tablet, Rfl: 3  •  metoprolol tartrate (LOPRESSOR) 25 MG tablet, take 1 tablet by mouth twice a day, Disp: 180 tablet, Rfl: 1  •  naproxen sodium (ALEVE) 220 MG tablet, Take 220 mg by mouth 2 (Two) Times a Day As Needed., Disp: , Rfl:   •  tamsulosin (FLOMAX) 0.4 MG capsule 24 hr capsule, tamsulosin 0.4 mg capsule  Take 1 capsule every day by oral route., Disp: , Rfl:   •  finasteride (PROPECIA) 1 MG tablet, Take 1 tablet by mouth Daily., Disp: 90 tablet, Rfl: 1  •  sildenafil (VIAGRA) 100 MG tablet, sildenafil 100 mg tablet  Take 1 tablet every day by oral route as needed., Disp: , Rfl:     No Known Allergies     reports that he has never smoked. He has never used smokeless tobacco.    Family History   Problem Relation Age of Onset   • Hypothyroidism Mother          in 80's   • Cancer Mother    • Cancer Father    • Diabetes type II Father    • Hypertension Father    • Coronary artery disease Father         60;s   • Obesity Other    •  "Coronary artery disease Other            Objective:   Blood pressure 118/78, pulse 56, height 188 cm (74\"), weight 125 kg (276 lb).  CONSTITUTIONAL: No acute distress, normal affect  RESPIRATORY: Normal effort. Clear to auscultation bilaterally without wheezing or rales  CARDIOVASCULAR: Carotids with normal upstrokes without bruits.  Regular rate and rhythm with normal S1 and S2. Without murmur, gallop or rub.  PERIPHERAL VASCULAR: Normal radial pulses bilaterally. There is no pedal edema today.      Labs:  Lab Results   Component Value Date    ALT 47 (H) 08/05/2019    AST 26 08/05/2019     Lab Results   Component Value Date    CHOL 177 08/05/2019    TRIG 144 08/05/2019    HDL 46 08/05/2019    CREATININE 1.15 08/05/2019       Diagnostic Data:    Procedures    TTE 10/2018  Interpretation Summary   · Left ventricular systolic function is normal. Estimated EF = 60%.  · Left ventricular wall thickness is consistent with mild concentric hypertrophy.  · Left atrial volume is mildly increased.     Stress test 4/2019  · Left ventricular ejection fraction is normal (Calculated EF = 69%).  · The exercise ECG portion of the stress test was negative for clinical and ECG evidence of myocardial ischemia. The Duke treadmill score was 8.3.  · The myocardial perfusion imaging portion of the stress test indicates a normal myocardial perfusion study with no evidence of ischemia.  · Impressions are consistent with a low risk study.    LLE Duplex US 8/2019 - negative for DVT and insufficiency    Assessment and Plan:   Katharine was seen today for hypertension and atrial fibrillation.    Diagnoses and all orders for this visit:    Paroxysmal atrial fibrillation  TACO   -Status post pulmonary vein cryoablation in 11/2015 by Dr. Silveira, remains in a regular rhythm today.    -Continue metoprolol and apixaban    Shortness of Breath  Lower Extremity Edema  TACO  Obesity due to excess calories, unspecified obesity severity  -Continue Lasix at 20 " mg daily.  -PFTs    Essential hypertension  Lightheadedness, positional  -Has been having relative hypotension even on a decreased dose of losartan at 50 mg daily.    -Home blood pressure cuff measuring similarly to office blood pressure cuff.  -Decrease losartan further to 25 mg daily.  Check blood pressure for the next week.  We will call him in 1 week to decide whether or not to discontinue losartan altogether.    Risk factor modification  -Significant improvement in cholesterol from 1/20/2019 to 8/20/2019.  Has lost significant weight.  Is still trying to exercise more and will lose more weight.  -Recommended lifestyle and risk factor modification, weight loss, and to continue his orthopedic workup for his left hip arthritis.      - Return in about 6 months (around 3/23/2020).    Gabriel Hanna MD, MSc, [facility]  Interventional Cardiology  [facility] at [facility]

## 2019-10-01 ENCOUNTER — TELEPHONE (OUTPATIENT)
Dept: CARDIOLOGY | Facility: CLINIC | Age: 59
End: 2019-10-01

## 2019-10-01 NOTE — TELEPHONE ENCOUNTER
Patient contacted to review BP recordings. Pt BP averaging 125/80. Patient advised to continue current dose of Losartan. Pt agreeable to plan, all questions answered at this time.

## 2019-10-23 ENCOUNTER — TELEPHONE (OUTPATIENT)
Dept: CARDIOLOGY | Facility: CLINIC | Age: 59
End: 2019-10-23

## 2019-10-23 NOTE — TELEPHONE ENCOUNTER
Okay to proceed from a cardiac standpoint.  Okay to hold Eliquis 48 hours prior to surgery.  Please have the patient discuss his shortness of breath with his PCP prior to surgery and whether or not he needs to undergo PFTs.  He was supposed to undergo pulmonary function tests after his last office visits.

## 2019-10-23 NOTE — TELEPHONE ENCOUNTER
Surgery Clearance:    Date: 10/11/2019  Provider: Dr. Allen @ [facility]  Surgery: Left Total Hip Replacement    Patient will need to hold eliquis 5 mg prior to procedure.    Contact: Imani   (P): [phone]  (F): [phone] attn: Imani    Please advise

## 2019-10-24 DIAGNOSIS — R06.02 SHORTNESS OF BREATH: Primary | ICD-10-CM

## 2019-10-28 ENCOUNTER — HOSPITAL ENCOUNTER (OUTPATIENT)
Dept: PULMONOLOGY | Facility: HOSPITAL | Age: 59
Discharge: HOME | End: 2019-10-28
Admitting: INTERNAL MEDICINE

## 2019-10-28 DIAGNOSIS — R06.02 SHORTNESS OF BREATH: ICD-10-CM

## 2019-10-28 LAB
ARTERIAL PATENCY WRIST A: ABNORMAL
ATMOSPHERIC PRESS: ABNORMAL MM[HG]
BASE EXCESS BLDA CALC-SCNC: 3.2 MMOL/L (ref 0–2)
BDY SITE: ABNORMAL
BODY TEMPERATURE: 37 C
CO2 BLDA-SCNC: 28.3 MMOL/L (ref 22–33)
COHGB MFR BLD: 0.2 % (ref 0–2)
EPAP: 0
HCO3 BLDA-SCNC: 27.1 MMOL/L (ref 20–26)
HCT VFR BLD CALC: 46.7 %
HGB BLDA-MCNC: 15.2 G/DL (ref 13.5–17.5)
HOROWITZ INDEX BLD+IHG-RTO: 21 %
IPAP: 0
METHGB BLD QL: 0.9 % (ref 0–1.5)
MODALITY: ABNORMAL
NOTE: ABNORMAL
OXYHGB MFR BLDV: 93.3 % (ref 94–99)
PAW @ PEAK INSP FLOW SETTING VENT: 0 CMH2O
PCO2 BLDA: 38.2 MM HG
PCO2 TEMP ADJ BLD: 38.2 MM HG (ref 35–48)
PH BLDA: 7.46 PH UNITS (ref 7.35–7.45)
PH, TEMP CORRECTED: 7.46 PH UNITS
PO2 BLDA: 70.8 MM HG (ref 83–108)
PO2 TEMP ADJ BLD: 70.8 MM HG (ref 83–108)
TOTAL RATE: 0 BREATHS/MINUTE
VENTILATOR MODE: ABNORMAL

## 2019-10-28 PROCEDURE — 94729 DIFFUSING CAPACITY: CPT | Performed by: INTERNAL MEDICINE

## 2019-10-28 PROCEDURE — 94060 EVALUATION OF WHEEZING: CPT

## 2019-10-28 PROCEDURE — 94727 GAS DIL/WSHOT DETER LNG VOL: CPT | Performed by: INTERNAL MEDICINE

## 2019-10-28 PROCEDURE — 36600 WITHDRAWAL OF ARTERIAL BLOOD: CPT

## 2019-10-28 PROCEDURE — 94060 EVALUATION OF WHEEZING: CPT | Performed by: INTERNAL MEDICINE

## 2019-10-28 PROCEDURE — 82805 BLOOD GASES W/O2 SATURATION: CPT

## 2019-10-28 PROCEDURE — 94729 DIFFUSING CAPACITY: CPT

## 2019-10-28 PROCEDURE — 94727 GAS DIL/WSHOT DETER LNG VOL: CPT

## 2019-11-05 ENCOUNTER — TELEPHONE (OUTPATIENT)
Dept: INTERNAL MEDICINE | Facility: CLINIC | Age: 59
End: 2019-11-05

## 2019-11-05 PROBLEM — J98.8 MILD AIRFLOW OBSTRUCTION ON PULMONARY FUNCTION TEST: Status: ACTIVE | Noted: 2019-11-05

## 2019-11-05 PROBLEM — R94.2 MILD AIRFLOW OBSTRUCTION ON PULMONARY FUNCTION TEST: Status: ACTIVE | Noted: 2019-11-05

## 2019-11-05 NOTE — TELEPHONE ENCOUNTER
PATIENT WOULD LIKE A RETURN CALL WITH HIS PFT RESULTS . HE WOULD ALSO LIKE TO BE SURE THAT HIS LEFT ANKLE BEING SWOLLEN WON'T BE AN ISSUE FOR HIS SURGERY.   PLEASE RETURN HIS CALL FOR FURTHER DISCUSSION. THANK YOU       [phone]

## 2019-11-05 NOTE — PROGRESS NOTES
Subjective   Katharine Lantigua is a 59 y.o. male.     History of Present Illness     Here for f/u on:    TERRY - PFTs were done last month and showed mild obstruction that improved w/ bronchodilators, as well as mild restriction. He has not been able to do much exercise w/ the hip arthritis, but does notice some sob w/ walking up stairs  No h/o smoking  H/o allergies, but no asthma. Has never used an inhaler before    Morbid obesity- pt has been doing HMR and has lost about 40 lbs. Wt was 315 at its highest    L ankle swelling for over a year. He has tried compression stockings but make his legs worse, r leg not as bad as the left leg. We did do duplex in 8/19 and was neg for dvt or reflux/valve insufficieny.he has seen Dr Head in '18 for varicose veins in left leg- Dr Blanco wanted him to use compression stockings but he could not tolerate   He also has B ankle arthritis - Has seen Dr Mendez as well  He does use lasix prn - a few times a week. Swelling tends to worsen as day goes on    Left hip pain - he is scheduled for a L THR next week    htn - he has felt better w/ lower dose of losartan, not as dizzy      Current Outpatient Medications on File Prior to Visit   Medication Sig Dispense Refill   • buPROPion XL (WELLBUTRIN XL) 300 MG 24 hr tablet Take 450 mg by mouth Daily.     • ELIQUIS 5 MG tablet tablet take 1 tablet by mouth every 12 hours 180 tablet 3   • finasteride (PROPECIA) 1 MG tablet Take 1 tablet by mouth Daily. 90 tablet 1   • furosemide (LASIX) 40 MG tablet take 1 tablet by mouth once daily if needed for SWELLING 90 tablet 1   • levothyroxine (SYNTHROID, LEVOTHROID) 88 MCG tablet take 1 tablet by mouth once daily 90 tablet 1   • losartan (COZAAR) 25 MG tablet Take 1 tablet by mouth Daily. 30 tablet 11   • metoprolol tartrate (LOPRESSOR) 25 MG tablet take 1 tablet by mouth twice a day 180 tablet 1   • naproxen sodium (ALEVE) 220 MG tablet Take 220 mg by mouth 2 (Two) Times a Day As Needed.     •  "sildenafil (VIAGRA) 100 MG tablet sildenafil 100 mg tablet   Take 1 tablet every day by oral route as needed.     • tamsulosin (FLOMAX) 0.4 MG capsule 24 hr capsule tamsulosin 0.4 mg capsule   Take 1 capsule every day by oral route.       No current facility-administered medications on file prior to visit.        The following portions of the patient's history were reviewed and updated as appropriate: allergies, current medications, past family history, past medical history, past social history, past surgical history and problem list.    Review of Systems   Constitutional: Positive for unexpected weight loss (intentional). Negative for activity change, appetite change and unexpected weight gain.   Respiratory: Positive for shortness of breath. Negative for wheezing.    Cardiovascular: Positive for leg swelling (left foot).   Musculoskeletal: Positive for arthralgias.   Allergic/Immunologic: Positive for environmental allergies. Negative for immunocompromised state.   Neurological: Negative for dizziness and confusion.       Objective   /78 (BP Location: Right arm)   Pulse 57   Temp 98 °F (36.7 °C) (Temporal)   Ht 188 cm (74\")   Wt 127 kg (279 lb 3.2 oz)   SpO2 98%   BMI 35.85 kg/m²   Physical Exam   Constitutional: He is oriented to person, place, and time. He appears well-developed and well-nourished. No distress.   HENT:   Head: Normocephalic and atraumatic.   Eyes: Conjunctivae and EOM are normal. Pupils are equal, round, and reactive to light. Right eye exhibits no discharge. Left eye exhibits no discharge.   Neck: Normal range of motion. Neck supple.   Cardiovascular: Normal rate, regular rhythm and intact distal pulses.   Pulmonary/Chest: Effort normal and breath sounds normal.   Musculoskeletal: He exhibits no edema (none today).   Neurological: He is alert and oriented to person, place, and time. No cranial nerve deficit.   Skin: Skin is warm and dry. No rash noted. He is not diaphoretic. "   Psychiatric: He has a normal mood and affect. His behavior is normal. Judgment and thought content normal.   Nursing note and vitals reviewed.        Assessment/Plan   Katharine was seen today for results.    Diagnoses and all orders for this visit:    Mild airflow obstruction on pulmonary function test - we will try albuteorl prn. We will fax over report to Dr PITTMAN's office. We will check CXR as well  -     albuterol sulfate  (90 Base) MCG/ACT inhaler; Inhale 1 puff Every 6 (Six) Hours As Needed for Shortness of Air.    SOB (shortness of breath)  -     XR Chest PA & Lateral; Future    Need for influenza vaccination  -     Flucelvax Quad=>4Years ([id number])        Reassured him about ankle swelling. He is concerned about DVT but he is on eliquis, had recent neg duplex, and intermittent swelling has been present chronically

## 2019-11-05 NOTE — TELEPHONE ENCOUNTER
JONHOVCARL I am not sure what he is talking about PFT test.  Dr. Chandler did not run any test on him.  Looks like cardiology did.  We have not seen him since August.

## 2019-11-05 NOTE — TELEPHONE ENCOUNTER
Dr. Hanna's office did call to say they had asked him to follow up here on the PFT results.  Dr. Hanna said he was okay to proceed with his surgery.

## 2019-11-05 NOTE — TELEPHONE ENCOUNTER
PN about appointment with Dr. Chandler and it has already been made by Dr. castaneda's office.  He will be in tomorrow.

## 2019-11-05 NOTE — TELEPHONE ENCOUNTER
PT LEFT VM AT 1:00PM STATING THAT HE HAD MISSED A CALL FROM JUAN AND HE IS REQUESTING ANOTHER CALL BACK TO SPEAK WITH HER. PLEASE ADVISE.

## 2019-11-06 ENCOUNTER — OFFICE VISIT (OUTPATIENT)
Dept: INTERNAL MEDICINE | Facility: CLINIC | Age: 59
End: 2019-11-06

## 2019-11-06 ENCOUNTER — TELEPHONE (OUTPATIENT)
Dept: INTERNAL MEDICINE | Facility: CLINIC | Age: 59
End: 2019-11-06

## 2019-11-06 ENCOUNTER — HOSPITAL ENCOUNTER (OUTPATIENT)
Dept: GENERAL RADIOLOGY | Facility: HOSPITAL | Age: 59
Discharge: HOME | End: 2019-11-06
Admitting: INTERNAL MEDICINE

## 2019-11-06 VITALS
HEART RATE: 57 BPM | TEMPERATURE: 98 F | BODY MASS INDEX: 35.83 KG/M2 | DIASTOLIC BLOOD PRESSURE: 78 MMHG | OXYGEN SATURATION: 98 % | HEIGHT: 74 IN | WEIGHT: 279.2 LBS | SYSTOLIC BLOOD PRESSURE: 120 MMHG

## 2019-11-06 DIAGNOSIS — J98.8 MILD AIRFLOW OBSTRUCTION ON PULMONARY FUNCTION TEST: Primary | ICD-10-CM

## 2019-11-06 DIAGNOSIS — R06.02 SOB (SHORTNESS OF BREATH): ICD-10-CM

## 2019-11-06 DIAGNOSIS — Z23 NEED FOR INFLUENZA VACCINATION: ICD-10-CM

## 2019-11-06 DIAGNOSIS — R94.2 MILD AIRFLOW OBSTRUCTION ON PULMONARY FUNCTION TEST: Primary | ICD-10-CM

## 2019-11-06 PROCEDURE — 90674 CCIIV4 VAC NO PRSV 0.5 ML IM: CPT | Performed by: INTERNAL MEDICINE

## 2019-11-06 PROCEDURE — 99214 OFFICE O/P EST MOD 30 MIN: CPT | Performed by: INTERNAL MEDICINE

## 2019-11-06 PROCEDURE — 71046 X-RAY EXAM CHEST 2 VIEWS: CPT

## 2019-11-06 PROCEDURE — 90471 IMMUNIZATION ADMIN: CPT | Performed by: INTERNAL MEDICINE

## 2019-11-06 RX ORDER — ALBUTEROL SULFATE 90 UG/1
1 AEROSOL, METERED RESPIRATORY (INHALATION) EVERY 6 HOURS PRN
Qty: 1 INHALER | Refills: 11 | Status: SHIPPED | OUTPATIENT
Start: 2019-11-06 | End: 2020-06-08

## 2019-11-06 NOTE — TELEPHONE ENCOUNTER
----- Message from Gail Chandler MD sent at 11/6/2019 11:07 AM EST -----  Regarding: PFT  Can you fax the PFT report to Dr PITTMAN's office,marya Diallo, fax is [phone]

## 2019-11-08 ENCOUNTER — TELEPHONE (OUTPATIENT)
Dept: INTERNAL MEDICINE | Facility: CLINIC | Age: 59
End: 2019-11-08

## 2019-11-08 NOTE — TELEPHONE ENCOUNTER
----- Message from Gail Chandler MD sent at 11/7/2019  5:25 PM EST -----  Can you also let him know cxr looks good, no concerns before surgery

## 2019-11-08 NOTE — TELEPHONE ENCOUNTER
----- Message from Gail Chandler MD sent at 11/7/2019  5:24 PM EST -----  Can you fax this over to Imani at Dr PITTMAN's office too, fax number is [phone]

## 2020-03-04 ENCOUNTER — OFFICE VISIT (OUTPATIENT)
Dept: INTERNAL MEDICINE | Facility: CLINIC | Age: 60
End: 2020-03-04

## 2020-03-04 VITALS
OXYGEN SATURATION: 98 % | BODY MASS INDEX: 36.57 KG/M2 | TEMPERATURE: 98.5 F | HEART RATE: 57 BPM | DIASTOLIC BLOOD PRESSURE: 78 MMHG | WEIGHT: 285 LBS | HEIGHT: 74 IN | SYSTOLIC BLOOD PRESSURE: 118 MMHG

## 2020-03-04 DIAGNOSIS — J40 BRONCHITIS: Primary | ICD-10-CM

## 2020-03-04 PROCEDURE — 99213 OFFICE O/P EST LOW 20 MIN: CPT | Performed by: INTERNAL MEDICINE

## 2020-03-04 RX ORDER — AZITHROMYCIN 250 MG/1
TABLET, FILM COATED ORAL
Qty: 6 TABLET | Refills: 0 | Status: SHIPPED | OUTPATIENT
Start: 2020-03-04 | End: 2020-06-08

## 2020-03-04 NOTE — PROGRESS NOTES
Subjective   Katharine Lantigua is a 59 y.o. male.     History of Present Illness     ST and cough over the last 10 days .cough is productive of yellow sputum. Some chest congestion and wheezing. Not much head congestion or nasal drainage  He is taking tylenol and nyquil - not much help.  No fever, some achiness      Current Outpatient Medications on File Prior to Visit   Medication Sig Dispense Refill   • albuterol sulfate  (90 Base) MCG/ACT inhaler Inhale 1 puff Every 6 (Six) Hours As Needed for Shortness of Air. 1 inhaler 11   • buPROPion XL (WELLBUTRIN XL) 300 MG 24 hr tablet Take 450 mg by mouth Daily.     • ELIQUIS 5 MG tablet tablet take 1 tablet by mouth every 12 hours 180 tablet 3   • finasteride (PROPECIA) 1 MG tablet Take 1 tablet by mouth Daily. 90 tablet 1   • furosemide (LASIX) 40 MG tablet take 1 tablet by mouth once daily if needed for SWELLING 90 tablet 1   • levothyroxine (SYNTHROID, LEVOTHROID) 88 MCG tablet take 1 tablet by mouth once daily 90 tablet 1   • losartan (COZAAR) 25 MG tablet Take 1 tablet by mouth Daily. 30 tablet 11   • metoprolol tartrate (LOPRESSOR) 25 MG tablet Take 1 tablet by mouth 2 (Two) Times a Day. 180 tablet 1   • naproxen sodium (ALEVE) 220 MG tablet Take 220 mg by mouth 2 (Two) Times a Day As Needed.     • sildenafil (VIAGRA) 100 MG tablet sildenafil 100 mg tablet   Take 1 tablet every day by oral route as needed.     • tamsulosin (FLOMAX) 0.4 MG capsule 24 hr capsule tamsulosin 0.4 mg capsule   Take 1 capsule every day by oral route.       No current facility-administered medications on file prior to visit.        The following portions of the patient's history were reviewed and updated as appropriate: allergies, current medications, past family history, past medical history, past social history, past surgical history and problem list.    Review of Systems   Constitutional: Negative for unexpected weight gain and unexpected weight loss (still doing HMR).   HENT:  "Positive for sore throat. Negative for congestion, ear pain, rhinorrhea, sinus pressure and sneezing.    Respiratory: Positive for cough, shortness of breath and wheezing.    Musculoskeletal:        Doing well from hip replacement - doing PT   Allergic/Immunologic: Negative for immunocompromised state.       Objective   /78 (BP Location: Left arm, Patient Position: Sitting)   Pulse 57   Temp 98.5 °F (36.9 °C) (Temporal)   Ht 188 cm (74\")   Wt 129 kg (285 lb)   SpO2 98%   BMI 36.59 kg/m²   Physical Exam   Constitutional: He is oriented to person, place, and time. He appears well-developed and well-nourished. No distress.   HENT:   Head: Normocephalic and atraumatic.   Mouth/Throat: No oropharyngeal exudate.   Op slightly erythematous but no exudate or drainage  TM dull B, no erythema  No sinus tenderness   Eyes: Pupils are equal, round, and reactive to light. Conjunctivae and EOM are normal. Right eye exhibits no discharge. Left eye exhibits no discharge.   Neck: Normal range of motion. Neck supple.   Cardiovascular: Normal rate and regular rhythm.   Pulmonary/Chest: Effort normal and breath sounds normal. No stridor. No respiratory distress. He has no wheezes.   Musculoskeletal: He exhibits no edema.   Neurological: He is alert and oriented to person, place, and time. No cranial nerve deficit.   Skin: Skin is warm and dry. No rash noted. He is not diaphoretic.   Psychiatric: He has a normal mood and affect. His behavior is normal. Judgment and thought content normal.   Nursing note and vitals reviewed.        Assessment/Plan   Katharine was seen today for cough and sore throat.    Diagnoses and all orders for this visit:    Bronchitis - we will go ahead w/ a round of antibiotics since symptoms have been present 10 days. Fluids/rest. Call if no better. Can continue otc's for symptom relief  -     azithromycin (ZITHROMAX Z-NURYS) 250 MG tablet; Take 2 tablets the first day, then 1 tablet daily for 4 " days.

## 2020-03-13 ENCOUNTER — TELEPHONE (OUTPATIENT)
Dept: INTERNAL MEDICINE | Facility: CLINIC | Age: 60
End: 2020-03-13

## 2020-03-13 NOTE — TELEPHONE ENCOUNTER
MR RAOUL SAYS THAT HE FINISHED A  5 DAY RX OF Z NURYS , HIS COUGH HAS GOTTEN BETTER, BUT IT IS STILL LINGERING, HE ALSO HAS A LITTLE DRAINAGE, HE WOULD LIKE TO KNOW IF HE SHOULD START ON ANOTHER ROUND OF MEDS. PLEASE ADVISE.

## 2020-03-13 NOTE — TELEPHONE ENCOUNTER
I would probably just wait it out - cough can sometimes stay for several weeks or even a month. If he feels he is worse next week, if he could call back

## 2020-03-27 ENCOUNTER — E-VISIT (OUTPATIENT)
Dept: INTERNAL MEDICINE | Facility: CLINIC | Age: 60
End: 2020-03-27

## 2020-03-27 ENCOUNTER — TELEPHONE (OUTPATIENT)
Dept: INTERNAL MEDICINE | Facility: CLINIC | Age: 60
End: 2020-03-27

## 2020-03-27 DIAGNOSIS — J01.81 OTHER ACUTE RECURRENT SINUSITIS: Primary | ICD-10-CM

## 2020-03-27 PROCEDURE — 99423 OL DIG E/M SVC 21+ MIN: CPT | Performed by: INTERNAL MEDICINE

## 2020-03-27 RX ORDER — CEFUROXIME AXETIL 250 MG/1
250 TABLET ORAL 2 TIMES DAILY
Qty: 14 TABLET | Refills: 0 | Status: SHIPPED | OUTPATIENT
Start: 2020-03-27 | End: 2020-06-08

## 2020-03-27 NOTE — TELEPHONE ENCOUNTER
Got patient set up on [employer] to do e-visit at this time. He agrees and will complete it as directed.

## 2020-03-27 NOTE — PROGRESS NOTES
Subjective   Katharnie Lantigua is a 59 y.o. male.     History of Present Illness     Evisit:   pt has had dry cough, SOB, subjective temperature (no thermometer) over last 2-3 weeks.  He has headache in forehead. Not much nasal drainage  He was seen in our office 3 weeks ago for URI symptoms and given a zpack. He felt better after the zpack, but never completely recovered, then symptoms have worsened in last 4-5 days.   He has been using nyquil as needed.   No known COVID-19 exposure, no travel  He does have h/o a fib    The following portions of the patient's history were reviewed and updated as appropriate: allergies, current medications, past family history, past medical history, past social history, past surgical history and problem list.    Review of Systems  General: subj fever, fatigue, poor sleep  ENT: headache, sinus pain, no drainage  Pulm: cough. SOB            Objective   Physical Exam      Assessment/Plan   Diagnoses and all orders for this visit:    Other acute recurrent sinusitis    Other orders  -     cefuroxime (Ceftin) 250 MG tablet; Take 1 tablet by mouth 2 (Two) Times a Day.      Fluids, rest, continue OTC's. To [facility] or ER if symptoms worsen    Total visit time was ~30 minutes

## 2020-03-27 NOTE — TELEPHONE ENCOUNTER
Patient states that he has completed the z-andrew. He states that within the last 5 days he has gotten more cold and flu symptoms. Runny nose, cough, low grade temp but hasn't checked actual number. He also is complaining of a headache, shortness of breath with cough. Rarely coughing up mucous at this time. Informed patient that he does not fall in the high risk category for testing according to the COVID-19 Algorithm. He verbalized understanding but does want to see if Dr. Chandler wants him to self-quarantined or be evaluated.

## 2020-03-27 NOTE — TELEPHONE ENCOUNTER
PATIENT WAS IN ABOUT 3 WEEKS AGO WITH COUGH, SORE THROAT, HEADACHE. HE FINISHED THE MEDICATION THAT WAS GIVEN, BUT NOW HE HAS ALL THE SYMPTOMS AGAIN WITH POS FEVER. WANTED TO KNOW IF HE NEEDS TO BE SEEN AGAIN OR SOMETHING CALLED IN.    PLEASE CALL BACK  [phone]

## 2020-04-02 RX ORDER — LEVOTHYROXINE SODIUM 88 UG/1
TABLET ORAL
Qty: 30 TABLET | Refills: 0 | Status: SHIPPED | OUTPATIENT
Start: 2020-04-02 | End: 2020-05-01

## 2020-04-17 ENCOUNTER — OFFICE VISIT (OUTPATIENT)
Dept: CARDIOLOGY | Facility: CLINIC | Age: 60
End: 2020-04-17

## 2020-04-17 VITALS
SYSTOLIC BLOOD PRESSURE: 156 MMHG | HEART RATE: 58 BPM | BODY MASS INDEX: 35.49 KG/M2 | WEIGHT: 276.5 LBS | HEIGHT: 74 IN | DIASTOLIC BLOOD PRESSURE: 97 MMHG

## 2020-04-17 DIAGNOSIS — E78.00 PURE HYPERCHOLESTEROLEMIA: ICD-10-CM

## 2020-04-17 DIAGNOSIS — G47.33 OSA ON CPAP: ICD-10-CM

## 2020-04-17 DIAGNOSIS — Z99.89 OSA ON CPAP: ICD-10-CM

## 2020-04-17 DIAGNOSIS — E66.01 CLASS 2 SEVERE OBESITY DUE TO EXCESS CALORIES WITH SERIOUS COMORBIDITY IN ADULT, UNSPECIFIED BMI (HCC): ICD-10-CM

## 2020-04-17 DIAGNOSIS — I48.0 PAROXYSMAL ATRIAL FIBRILLATION (HCC): Primary | ICD-10-CM

## 2020-04-17 PROCEDURE — 99214 OFFICE O/P EST MOD 30 MIN: CPT | Performed by: INTERNAL MEDICINE

## 2020-04-17 NOTE — PROGRESS NOTES
" [facility] AT [address], [address]  [place], [place], [address] [phone]  [email or web address]           OUTPATIENT CLINIC FOLLOW UP NOTE    Patient Care Team:  Patient Care Team:  Gail Chandler MD as PCP - General  Gail Chandler MD as PCP - Family Medicine  Alex Zaragoza MD as Consulting Physician (Psychiatry)  Andrew Mtz MD as Consulting Physician (Orthopedic Surgery)  Emmanuel Carias MD as Consulting Physician (Urology)  Gabriel Hanna MD as Consulting Physician (Cardiology)    Subjective:   Reason for consultation:   Chief Complaint   Patient presents with   • Atrial Fibrillation       HPI:    Katharine Lantigua is a 59 y.o. male.  Cardiac problem list:  1. Paroxysmal atrial fibrillation status post failed cardioversion, failed flecainide and dofetilide therapy, status post successful cryoablation of the pulmonary veins 11/2015 Dr Silveira  2. Essential hypertension  3. Orthostasis  4. Moderate TACO, couldn't tolerate CPAP or customized dental device. Follows up at [facility] sleep clinic  5. Morbid obesity  6. Surgical history  1. Left hip replacement Fall 2019    The patient presents for follow-up by telephone    In a lot of stress recently due to work and finances during the coronavirus outbreak.  Not sleeping well due to waking up multiple times a night. Once  sleep clinic opens, will return to them.  Has not tried any new therapies.  Is trying to exercise more.  Down 40lbs since 6/2019, but hit a plateau since hip replacement. Trying to increase exercise but has been limited due to the hip replacement. Now having right hip pain. May need replacement in the next 5 years.  Is trying to follow the R diet    BP stable at home. No consistent \"AFib\" indicator.     Chronic mild exertional dyspnea. Intermittent swelling, intermittent PRN Lasix. No chest pain. No palpitations    Review of Systems:  Positive for mild chronic shortness of breath, " intermittent lower extremity edema, sleep disturbances.    Negative for chest tightness, sorthopnea, PND,palpitations, lightheadedness, syncope.     PFSH:  Patient Active Problem List   Diagnosis   • Paroxysmal atrial fibrillation (CMS/HCC)   • Obesity   • TACO on CPAP   • Acquired hypothyroidism   • Gout   • Insomnia   • Essential hypertension   • Pain in joint involving ankle and foot   • Ankle mass, right   • Venous stasis   • Plantar wart of right foot   • Nerve sheath tumor   • Shortness of breath   • Bilateral lower extremity edema   • Pure hypercholesterolemia   • Mild airflow obstruction on pulmonary function test         Current Outpatient Medications:   •  albuterol sulfate  (90 Base) MCG/ACT inhaler, Inhale 1 puff Every 6 (Six) Hours As Needed for Shortness of Air., Disp: 1 inhaler, Rfl: 11  •  ELIQUIS 5 MG tablet tablet, take 1 tablet by mouth every 12 hours, Disp: 180 tablet, Rfl: 3  •  furosemide (LASIX) 40 MG tablet, take 1 tablet by mouth once daily if needed for SWELLING, Disp: 90 tablet, Rfl: 1  •  levothyroxine (SYNTHROID, LEVOTHROID) 88 MCG tablet, TAKE 1 TABLET BY MOUTH EVERY DAY, Disp: 30 tablet, Rfl: 0  •  losartan (COZAAR) 25 MG tablet, Take 1 tablet by mouth Daily., Disp: 30 tablet, Rfl: 11  •  metoprolol tartrate (LOPRESSOR) 25 MG tablet, Take 1 tablet by mouth 2 (Two) Times a Day., Disp: 180 tablet, Rfl: 1  •  naproxen sodium (ALEVE) 220 MG tablet, Take 220 mg by mouth 2 (Two) Times a Day As Needed., Disp: , Rfl:   •  sildenafil (VIAGRA) 100 MG tablet, sildenafil 100 mg tablet  Take 1 tablet every day by oral route as needed., Disp: , Rfl:   •  tamsulosin (FLOMAX) 0.4 MG capsule 24 hr capsule, tamsulosin 0.4 mg capsule  Take 1 capsule every day by oral route., Disp: , Rfl:   •  azithromycin (ZITHROMAX Z-NURYS) 250 MG tablet, Take 2 tablets the first day, then 1 tablet daily for 4 days., Disp: 6 tablet, Rfl: 0  •  buPROPion XL (WELLBUTRIN XL) 300 MG 24 hr tablet, Take 450 mg by mouth  "Daily., Disp: , Rfl:   •  cefuroxime (Ceftin) 250 MG tablet, Take 1 tablet by mouth 2 (Two) Times a Day., Disp: 14 tablet, Rfl: 0  •  finasteride (PROPECIA) 1 MG tablet, Take 1 tablet by mouth Daily., Disp: 90 tablet, Rfl: 1    No Known Allergies     reports that he has never smoked. He has never used smokeless tobacco.    Family History   Problem Relation Age of Onset   • Hypothyroidism Mother          in 80's   • Cancer Mother    • Cancer Father    • Diabetes type II Father    • Hypertension Father    • Coronary artery disease Father         60;s   • Obesity Other    • Coronary artery disease Other        Objective:   Blood pressure 156/97, pulse 58, height 188 cm (74\"), weight 125 kg (276 lb 8 oz).  Repeat BPs 132/91, HR 59; 123/86; 124/70  CONSTITUTIONAL: No acute distress, normal affect  RESPIRATORY: No conversational dyspena    Labs:  Lab Results   Component Value Date    ALT 47 (H) 2019    AST 26 2019     Lab Results   Component Value Date    CHOL 177 2019    TRIG 144 2019    HDL 46 2019    CREATININE 1.15 2019       Diagnostic Data:    Procedures    TTE 10/2018  · Left ventricular systolic function is normal. Estimated EF = 60%.  · Left ventricular wall thickness is consistent with mild concentric hypertrophy.  · Left atrial volume is mildly increased.    Stress test 2019  · Left ventricular ejection fraction is normal (Calculated EF = 69%).  · The exercise ECG portion of the stress test was negative for clinical and ECG evidence of myocardial ischemia. The Duke treadmill score was 8.3.  · The myocardial perfusion imaging portion of the stress test indicates a normal myocardial perfusion study with no evidence of ischemia.  · Impressions are consistent with a low risk study.    LLE Duplex US 2019 - negative for DVT or valvular insufficiency or reflux    PFTs 10/2019  Spirometry: Spirometry demonstrates mild airway obstruction.  Post Bronchodilator: Following " the inhalation of a bronchodilator, there is significant improvement in airway mechanics.   MVV: N/A  Lung Volume: Lung volumes are consistent with mild restrictive lung disease.  Diffusion: The diffusing capacity for carbon monoxide, a reflection of alveolar-capillary gas transport, is normal.       Assessment and Plan:   Katharine was seen today for hypertension and atrial fibrillation.    Diagnoses and all orders for this visit:    Paroxysmal atrial fibrillation  -Status post pulmonary vein cryoablation in 11/2015 by Dr. Silveira, remains in a regular rhythm today.    -Continue metoprolol and apixaban    Shortness of Breath  Lower Extremity Edema  TACO, sleep disturbances  Obesity due to excess calories, unspecified obesity severity  -Continue Lasix at 20 mg daily.  -Previously followed by the [facility] sleep team.  Will refer him to our sleep team since he is aiming to consolidate his care here at [facility].  Did not tolerate CPAP or a custom-made dental device in the past.  Not sleeping well  -Strongly encouraged continued exercise, should aim to obtain some aerobic exercises  -Continue R diet plan  -The patient is interested in a discussion with our bariatric team.  Will refer to their office    Essential hypertension  Lightheadedness, positional, improved after prior medication dose reduction, 4/2019  -Continue current medications    Risk factor modification  -Significant improvement in cholesterol from 1/20/2019 to 8/20/2019.  Has lost significant weight.  Is still trying to exercise more and will lose more weight.  -ASCVD risk score 8.9% today, will repeat lipid panel LFTs.  Started discussion on statin therapy with the patient today.  Will hold off statins for now    - Return in about 6 months (around 10/17/2020).    This patient has consented to a telehealth visit via telephone. The visit was scheduled as a telephone visit (patient did not have reliable access to a video platform) to comply with patient safety  concerns in accordance with CDC recommendations.  All vitals recorded within this visit are reported by the patient.  I spent  30 minutes total on patient care including 19 minutes spent in direct conversation with this patient.     Gabriel Hanna MD, MSc, [facility]  Interventional Cardiology  [facility] at [facility]

## 2020-04-20 RX ORDER — FUROSEMIDE 40 MG/1
TABLET ORAL
Qty: 90 TABLET | Refills: 0 | Status: SHIPPED | OUTPATIENT
Start: 2020-04-20 | End: 2020-09-11

## 2020-04-29 ENCOUNTER — TELEMEDICINE (OUTPATIENT)
Dept: SLEEP MEDICINE | Facility: HOSPITAL | Age: 60
End: 2020-04-29

## 2020-04-29 DIAGNOSIS — G47.33 OBSTRUCTIVE SLEEP APNEA, ADULT: ICD-10-CM

## 2020-04-29 DIAGNOSIS — E66.01 CLASS 2 SEVERE OBESITY DUE TO EXCESS CALORIES WITH SERIOUS COMORBIDITY AND BODY MASS INDEX (BMI) OF 37.0 TO 37.9 IN ADULT (HCC): ICD-10-CM

## 2020-04-29 DIAGNOSIS — R06.83 SNORING: Primary | ICD-10-CM

## 2020-04-29 DIAGNOSIS — F51.04 PSYCHOPHYSIOLOGICAL INSOMNIA: ICD-10-CM

## 2020-04-29 PROCEDURE — 99443 PR PHYS/QHP TELEPHONE EVALUATION 21-30 MIN: CPT | Performed by: INTERNAL MEDICINE

## 2020-04-29 NOTE — PATIENT INSTRUCTIONS
Insomnia  Insomnia is a sleep disorder that makes it difficult to fall asleep or stay asleep. Insomnia can cause fatigue, low energy, difficulty concentrating, mood swings, and poor performance at work or school.  There are three different ways to classify insomnia:  · Difficulty falling asleep.  · Difficulty staying asleep.  · Waking up too early in the morning.  Any type of insomnia can be long-term (chronic) or short-term (acute). Both are common. Short-term insomnia usually lasts for three months or less. Chronic insomnia occurs at least three times a week for longer than three months.  What are the causes?  Insomnia may be caused by another condition, situation, or substance, such as:  · Anxiety.  · Certain medicines.  · Gastroesophageal reflux disease (GERD) or other gastrointestinal conditions.  · Asthma or other breathing conditions.  · Restless legs syndrome, sleep apnea, or other sleep disorders.  · Chronic pain.  · Menopause.  · Stroke.  · Abuse of alcohol, tobacco, or illegal drugs.  · Mental health conditions, such as depression.  · Caffeine.  · Neurological disorders, such as Alzheimer's disease.  · An overactive thyroid (hyperthyroidism).  Sometimes, the cause of insomnia may not be known.  What increases the risk?  Risk factors for insomnia include:  · Gender. Women are affected more often than men.  · Age. Insomnia is more common as you get older.  · Stress.  · Lack of exercise.  · Irregular work schedule or working night shifts.  · Traveling between different time zones.  · Certain medical and mental health conditions.  What are the signs or symptoms?  If you have insomnia, the main symptom is having trouble falling asleep or having trouble staying asleep. This may lead to other symptoms, such as:  · Feeling fatigued or having low energy.  · Feeling nervous about going to sleep.  · Not feeling rested in the morning.  · Having trouble concentrating.  · Feeling irritable, anxious, or depressed.  How  is this diagnosed?  This condition may be diagnosed based on:  · Your symptoms and medical history. Your health care provider may ask about:  ? Your sleep habits.  ? Any medical conditions you have.  ? Your mental health.  · A physical exam.  How is this treated?  Treatment for insomnia depends on the cause. Treatment may focus on treating an underlying condition that is causing insomnia. Treatment may also include:  · Medicines to help you sleep.  · Counseling or therapy.  · Lifestyle adjustments to help you sleep better.  Follow these instructions at home:  Eating and drinking    · Limit or avoid alcohol, caffeinated beverages, and cigarettes, especially close to bedtime. These can disrupt your sleep.  · Do not eat a large meal or eat spicy foods right before bedtime. This can lead to digestive discomfort that can make it hard for you to sleep.  Sleep habits    · Keep a sleep diary to help you and your health care provider figure out what could be causing your insomnia. Write down:  ? When you sleep.  ? When you wake up during the night.  ? How well you sleep.  ? How rested you feel the next day.  ? Any side effects of medicines you are taking.  ? What you eat and drink.  · Make your bedroom a dark, comfortable place where it is easy to fall asleep.  ? Put up shades or blackout curtains to block light from outside.  ? Use a white noise machine to block noise.  ? Keep the temperature cool.  · Limit screen use before bedtime. This includes:  ? Watching TV.  ? Using your smartphone, tablet, or computer.  · Stick to a routine that includes going to bed and waking up at the same times every day and night. This can help you fall asleep faster. Consider making a quiet activity, such as reading, part of your nighttime routine.  · Try to avoid taking naps during the day so that you sleep better at night.  · Get out of bed if you are still awake after 15 minutes of trying to sleep. Keep the lights down, but try reading or  doing a quiet activity. When you feel sleepy, go back to bed.  General instructions  · Take over-the-counter and prescription medicines only as told by your health care provider.  · Exercise regularly, as told by your health care provider. Avoid exercise starting several hours before bedtime.  · Use relaxation techniques to manage stress. Ask your health care provider to suggest some techniques that may work well for you. These may include:  ? Breathing exercises.  ? Routines to release muscle tension.  ? Visualizing peaceful scenes.  · Make sure that you drive carefully. Avoid driving if you feel very sleepy.  · Keep all follow-up visits as told by your health care provider. This is important.  Contact a health care provider if:  · You are tired throughout the day.  · You have trouble in your daily routine due to sleepiness.  · You continue to have sleep problems, or your sleep problems get worse.  Get help right away if:  · You have serious thoughts about hurting yourself or someone else.  If you ever feel like you may hurt yourself or others, or have thoughts about taking your own life, get help right away. You can go to your nearest emergency department or call:  · Your local emergency services (911 in the [place]  · A suicide crisis helpline, such as the National Suicide Prevention Lifeline at 1-492.896.1434. This is open 24 hours a day.  Summary  · Insomnia is a sleep disorder that makes it difficult to fall asleep or stay asleep.  · Insomnia can be long-term (chronic) or short-term (acute).  · Treatment for insomnia depends on the cause. Treatment may focus on treating an underlying condition that is causing insomnia.  · Keep a sleep diary to help you and your health care provider figure out what could be causing your insomnia.  This information is not intended to replace advice given to you by your health care provider. Make sure you discuss any questions you have with your health care provider.  Document  Released: 12/15/2001 Document Revised: 09/27/2018 Document Reviewed: 09/27/2018  [employer] Interactive Patient Education © 2020 [employer]  Sleep Apnea  Sleep apnea is a condition in which breathing pauses or becomes shallow during sleep. Episodes of sleep apnea usually last 10 seconds or longer, and they may occur as many as 20 times an hour. Sleep apnea disrupts your sleep and keeps your body from getting the rest that it needs. This condition can increase your risk of certain health problems, including:  · Heart attack.  · Stroke.  · Obesity.  · Diabetes.  · Heart failure.  · Irregular heartbeat.  What are the causes?  There are three kinds of sleep apnea:  · Obstructive sleep apnea. This kind is caused by a blocked or collapsed airway.  · Central sleep apnea. This kind happens when the part of the brain that controls breathing does not send the correct signals to the muscles that control breathing.  · Mixed sleep apnea. This is a combination of obstructive and central sleep apnea.  The most common cause of this condition is a collapsed or blocked airway. An airway can collapse or become blocked if:  · Your throat muscles are abnormally relaxed.  · Your tongue and tonsils are larger than normal.  · You are overweight.  · Your airway is smaller than normal.  What increases the risk?  You are more likely to develop this condition if you:  · Are overweight.  · Smoke.  · Have a smaller than normal airway.  · Are elderly.  · Are male.  · Drink alcohol.  · Take sedatives or tranquilizers.  · Have a family history of sleep apnea.  What are the signs or symptoms?  Symptoms of this condition include:  · Trouble staying asleep.  · Daytime sleepiness and tiredness.  · Irritability.  · Loud snoring.  · Morning headaches.  · Trouble concentrating.  · Forgetfulness.  · Decreased interest in sex.  · Unexplained sleepiness.  · Mood swings.  · Personality changes.  · Feelings of depression.  · Waking up often during the night  to urinate.  · Dry mouth.  · Sore throat.  How is this diagnosed?  This condition may be diagnosed with:  · A medical history.  · A physical exam.  · A series of tests that are done while you are sleeping (sleep study). These tests are usually done in a sleep lab, but they may also be done at home.  How is this treated?  Treatment for this condition aims to restore normal breathing and to ease symptoms during sleep. It may involve managing health issues that can affect breathing, such as high blood pressure or obesity. Treatment may include:  · Sleeping on your side.  · Using a decongestant if you have nasal congestion.  · Avoiding the use of depressants, including alcohol, sedatives, and narcotics.  · Losing weight if you are overweight.  · Making changes to your diet.  · Quitting smoking.  · Using a device to open your airway while you sleep, such as:  ? An oral appliance. This is a custom-made mouthpiece that shifts your lower jaw forward.  ? A continuous positive airway pressure (CPAP) device. This device blows air through a mask when you breathe out (exhale).  ? A nasal expiratory positive airway pressure (EPAP) device. This device has valves that you put into each nostril.  ? A bi-level positive airway pressure (BPAP) device. This device blows air through a mask when you breathe in (inhale) and breathe out (exhale).  · Having surgery if other treatments do not work. During surgery, excess tissue is removed to create a wider airway.  It is important to get treatment for sleep apnea. Without treatment, this condition can lead to:  · High blood pressure.  · Coronary artery disease.  · In men, an inability to achieve or maintain an erection (impotence).  · Reduced thinking abilities.  Follow these instructions at home:  Lifestyle  · Make any lifestyle changes that your health care provider recommends.  · Eat a healthy, well-balanced diet.  · Take steps to lose weight if you are overweight.  · Avoid using  depressants, including alcohol, sedatives, and narcotics.  · Do not use any products that contain nicotine or tobacco, such as cigarettes, e-cigarettes, and chewing tobacco. If you need help quitting, ask your health care provider.  General instructions  · Take over-the-counter and prescription medicines only as told by your health care provider.  · If you were given a device to open your airway while you sleep, use it only as told by your health care provider.  · If you are having surgery, make sure to tell your health care provider you have sleep apnea. You may need to bring your device with you.  · Keep all follow-up visits as told by your health care provider. This is important.  Contact a health care provider if:  · The device that you received to open your airway during sleep is uncomfortable or does not seem to be working.  · Your symptoms do not improve.  · Your symptoms get worse.  Get help right away if:  · You develop:  ? Chest pain.  ? Shortness of breath.  ? Discomfort in your back, arms, or stomach.  · You have:  ? Trouble speaking.  ? Weakness on one side of your body.  ? Drooping in your face.  These symptoms may represent a serious problem that is an emergency. Do not wait to see if the symptoms will go away. Get medical help right away. Call your local emergency services (911 in the [place] Do not drive yourself to the hospital.  Summary  · Sleep apnea is a condition in which breathing pauses or becomes shallow during sleep.  · The most common cause is a collapsed or blocked airway.  · The goal of treatment is to restore normal breathing and to ease symptoms during sleep.  This information is not intended to replace advice given to you by your health care provider. Make sure you discuss any questions you have with your health care provider.  Document Released: 12/08/2003 Document Revised: 10/04/2019 Document Reviewed: 08/13/2019  [employer] Interactive Patient Education © 2020 [employer]

## 2020-04-30 NOTE — PROGRESS NOTES
Subjective   Katharine Lantigua is a 60 y.o. male is being seen for consultation today at the request of Gabriel Hanna MD for the evaluation of snoring and obstructive sleep apnea as well as insomnia.  His primary care physician is Dr. Chandler. You have chosen to receive care through a telehealth visit.  Do you consent to use a video/audio connection for your medical care today? Yes Unable to complete visit using a video connection to the patient. A phone visit was used to complete this visits. Total time of discussion was 30 minutes.    History of Present Illness  Patient has a long history of snoring and nonrestorative sleep.  He was seen in this clinic in 2016 and had moderate obstructive sleep apnea on home sleep testing at that time.  His AHI was 23.4.  He was placed on CPAP but could not tolerate it.  He says he would wake up with the mask on the floor or with wake up between 3 and 6 times per night and was not feeling rested.  He says he tried it for probably about a year and then quit using it.  He has also had problems falling asleep.  He was seen at [facility] sleep clinic and was referred for CBT-I.  He also got an oral device last year and is able to tolerate it but says that he still has snoring and non-restful sleep.  He still having problems falling asleep and staying asleep.    He says his weight went up as high as 320 pounds but he is now back to that 270.  He has been drinking a lot of water as part of his diet but it makes him need to get up to go the bathroom gently at night.  He says he has stresses over going to sleep and sometimes has problems getting back to sleep.  He has been on medications including Lunesta and Ambien without benefit.  He has occasionally been noted to awaken screaming at night.    He has had a history of atrial fibrillation and had cardioversion and later an ablation.  He says he has been relatively stable with that.    He arises about 10 AM.  He will have coughing and works.  He  is on the [facility] diet program and has that for lunch she does more work in the after noon.  He has dinner about 7 PM and they do further work at night says he goes to bed about midnight and takes about an hour to go to sleep he is awakened multiple times during the night and thinks he gets about 5 hours of sleep.  He had a hip replacement in November and is trying to get back on an exercise program he has been riding a bike and walking.  He has had hypertension on for 5 years.  He has any history of diabetes.  No Known Allergies       Current Outpatient Medications:   •  albuterol sulfate  (90 Base) MCG/ACT inhaler, Inhale 1 puff Every 6 (Six) Hours As Needed for Shortness of Air., Disp: 1 inhaler, Rfl: 11  •  azithromycin (ZITHROMAX Z-NURYS) 250 MG tablet, Take 2 tablets the first day, then 1 tablet daily for 4 days., Disp: 6 tablet, Rfl: 0  •  buPROPion XL (WELLBUTRIN XL) 300 MG 24 hr tablet, Take 450 mg by mouth Daily., Disp: , Rfl:   •  cefuroxime (Ceftin) 250 MG tablet, Take 1 tablet by mouth 2 (Two) Times a Day., Disp: 14 tablet, Rfl: 0  •  ELIQUIS 5 MG tablet tablet, take 1 tablet by mouth every 12 hours, Disp: 180 tablet, Rfl: 3  •  finasteride (PROPECIA) 1 MG tablet, Take 1 tablet by mouth Daily., Disp: 90 tablet, Rfl: 1  •  furosemide (LASIX) 40 MG tablet, TAKE 1 TABLET BY MOUTH ONCE DAILY IF NEEDED FOR SWELLING, Disp: 90 tablet, Rfl: 0  •  levothyroxine (SYNTHROID, LEVOTHROID) 88 MCG tablet, TAKE 1 TABLET BY MOUTH EVERY DAY, Disp: 30 tablet, Rfl: 0  •  losartan (COZAAR) 25 MG tablet, Take 1 tablet by mouth Daily., Disp: 30 tablet, Rfl: 11  •  metoprolol tartrate (LOPRESSOR) 25 MG tablet, Take 1 tablet by mouth 2 (Two) Times a Day., Disp: 180 tablet, Rfl: 1  •  naproxen sodium (ALEVE) 220 MG tablet, Take 220 mg by mouth 2 (Two) Times a Day As Needed., Disp: , Rfl:   •  sildenafil (VIAGRA) 100 MG tablet, sildenafil 100 mg tablet  Take 1 tablet every day by oral route as needed., Disp: , Rfl:   •   Suvorexant 10 MG tablet, Take 1 tablet by mouth Every Night., Disp: 30 tablet, Rfl: 2  •  tamsulosin (FLOMAX) 0.4 MG capsule 24 hr capsule, tamsulosin 0.4 mg capsule  Take 1 capsule every day by oral route., Disp: , Rfl:     Social History    Tobacco Use      Smoking status: Never Smoker      Smokeless tobacco: Never Used       Social History     Substance and Sexual Activity   Alcohol Use Yes   • Alcohol/week: 2.0 standard drinks   • Types: 2 Glasses of wine per week    Comment: 3 X weekly        Caffeine: He has 3 cups of coffee per day    Past Medical History:   Diagnosis Date   • Abnormal adenosine sestamibi study    • BCC (basal cell carcinoma of skin) 2018    nose   • CPAP (continuous positive airway pressure) dependence    • Depression     Dr. Peterson   • H/O cardiovascular stress test 04/2010    negative   • H/O colonoscopy 12/2014    Dr. Olivier- neg. 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 - Dr. Amin   • Osteoarthritis    • Paroxysmal atrial fibrillation (CMS/HCC) 09/2015   • Screening PSA (prostate specific antigen) 04/2016    0.4; 2/15 0.4; 1/14 0.4   • Skin cancer of face        Past Surgical History:   Procedure Laterality Date   • ANAL FISTULA REPAIR      1980 & 1990   • CARDIAC ABLATION     • COLONOSCOPY     • JOINT REPLACEMENT Right    • MOHS SURGERY Left 2018    left ala - BCC   • SEPTOPLASTY     • ARCENIO  08/18/2015    ARCENIO/external cardioversion into normal sinus rhythm with initiation of flecainide, 08/18/2015.  EF 55% to 60%.   • TONSILLECTOMY     • TOTAL HIP ARTHROPLASTY Right 1999    Right  total hip replacement. (h/o childhood fracture)   • TOTAL HIP ARTHROPLASTY Left 2019    Dr Allen   • VENOUS ABLATION      Status post cryoablation of the pulmonary veins, 2015, with maintenance of sinus rhythm on no antiarrhythmic.       Family History   Problem Relation Age of Onset   • Hypothyroidism Mother          in 80's   • Cancer Mother    • Cancer Father    • Diabetes type II Father    • Hypertension Father    • Coronary artery disease Father         60;s   • Obesity Other    • Coronary artery disease Other        The following portions of the patient's history were reviewed and updated as appropriate: allergies, current medications, past family history, past medical history, past social history, past surgical history and problem list.    Review of Systems   Constitutional: Negative.    HENT: Positive for hearing loss and tinnitus.    Eyes: Negative.    Respiratory: Negative.    Cardiovascular: Positive for leg swelling.   Gastrointestinal: Negative.    Endocrine: Negative.    Genitourinary: Positive for difficulty urinating.   Musculoskeletal: Positive for arthralgias and joint swelling.   Skin: Negative.    Allergic/Immunologic: Positive for environmental allergies.   Neurological: Negative.    Hematological: Negative.    Psychiatric/Behavioral: Positive for sleep disturbance.   [place] score is 9/24    Objective     There were no vitals taken for this visit.     Physical Exam  Patient sounds to be awake and alert.  He does not appear to be in respiratory distress.  Weight is stated at 270 pounds.  Height 6 feet 2 inches.  There is neck size is 20 inches.  His body mass index 35.    Assessment/Plan   Diagnoses and all orders for this visit:    Snoring  -     Home Sleep Study; Future    Obstructive sleep apnea, adult  -     Home Sleep Study; Future    Class 2 severe obesity due to excess calories with serious comorbidity and body mass index (BMI) of 37.0 to 37.9 in adult  (CMS/[facility])    Psychophysiological insomnia  -     Suvorexant 10 MG tablet; Take 1 tablet by mouth Every Night.    Patient continues to have problems with snoring nonrestorative sleep.  He is previously found to have obstructive sleep apnea and I suspect that he still has it.  He is willing to consider trying CPAP again.  We will plan to proceed to home sleep testing.  I have discussed possible therapies including CPAP, weight control, oral appliance, and surgery including a hypoglossal nerve stimulator.  We have also discussed the long-term consequences of untreated obstructive sleep apnea.  He is encouraged to lose weight.  He is encouraged avoid alcohol and sedatives close to bedtime.  He is encouraged to practice lateral position sleep.    Patient also seems to have significant psychophysiologic insomnia.  We have discussed measures to improve sleep hygiene.  We have also try him on suvorexant before bedtime and see if we can get him sleeping a little bit better there.  We will plan to see him back after his sleep test.  Our ultimate goal would be to try to get him sleeping better without medications however rather than sleeping better with medications.  Dequan report reviewed and is #[id number]    Total time: 30 minutes excluding procedures.     Andrew Amin MD [facility]  Sleep Medicine  Pulmonary and Critical Care Medicine

## 2020-05-01 ENCOUNTER — TELEPHONE (OUTPATIENT)
Dept: SLEEP MEDICINE | Facility: HOSPITAL | Age: 60
End: 2020-05-01

## 2020-05-01 RX ORDER — LEVOTHYROXINE SODIUM 88 UG/1
TABLET ORAL
Qty: 30 TABLET | Refills: 0 | Status: SHIPPED | OUTPATIENT
Start: 2020-05-01 | End: 2020-05-27

## 2020-05-01 NOTE — TELEPHONE ENCOUNTER
Patient's insurance denied belsomra because patient failed to trial at least 2 weeks of zolpidem or zaleplon. Please advise.

## 2020-05-06 DIAGNOSIS — F51.04 PSYCHOPHYSIOLOGICAL INSOMNIA: Primary | ICD-10-CM

## 2020-05-06 RX ORDER — ZOLPIDEM TARTRATE 10 MG/1
10 TABLET ORAL NIGHTLY PRN
Qty: 15 TABLET | Refills: 0 | Status: SHIPPED | OUTPATIENT
Start: 2020-05-06 | End: 2020-06-03

## 2020-05-06 NOTE — TELEPHONE ENCOUNTER
Done  Andrew Amin MD [facility]  Sleep Medicine  Pulmonary and Critical Care Medicine

## 2020-05-06 NOTE — PROGRESS NOTES
His insurance would not cover Belsomra without trying Ambien first.  We will prescribe 2-week Ann Amin MD [facility]  Sleep Medicine  Pulmonary and Critical Care Medicine

## 2020-05-06 NOTE — TELEPHONE ENCOUNTER
Dr. Amin has ordered ambien for patient since [facility] denied. LVM for patien to return call for details.

## 2020-05-07 NOTE — TELEPHONE ENCOUNTER
ADVISED PATIENT THAT AMBIEN WAS PRESCRIBED. PATIENT STATED THAT HE PICKED UP RX ON 5/6/20 AND WILL LET US KNOW IF HE'S HAVING ANY ISSUES WITH IT.

## 2020-05-27 RX ORDER — LEVOTHYROXINE SODIUM 88 UG/1
TABLET ORAL
Qty: 30 TABLET | Refills: 0 | Status: SHIPPED | OUTPATIENT
Start: 2020-05-27 | End: 2020-06-14

## 2020-05-30 DIAGNOSIS — F51.04 PSYCHOPHYSIOLOGICAL INSOMNIA: ICD-10-CM

## 2020-06-03 RX ORDER — ZOLPIDEM TARTRATE 10 MG/1
10 TABLET ORAL NIGHTLY PRN
Qty: 30 TABLET | Refills: 2 | Status: SHIPPED | OUTPATIENT
Start: 2020-06-03 | End: 2020-07-17 | Stop reason: ALTCHOICE

## 2020-06-03 NOTE — TELEPHONE ENCOUNTER
Dequan report reviewed on April 29.  We will refill his prescription  Andrew Aimn MD [facility]  Sleep Medicine  Pulmonary and Critical Care Medicine

## 2020-06-08 ENCOUNTER — OFFICE VISIT (OUTPATIENT)
Dept: INTERNAL MEDICINE | Facility: CLINIC | Age: 60
End: 2020-06-08

## 2020-06-08 VITALS
HEIGHT: 74 IN | WEIGHT: 276 LBS | SYSTOLIC BLOOD PRESSURE: 120 MMHG | BODY MASS INDEX: 35.42 KG/M2 | TEMPERATURE: 97.8 F | DIASTOLIC BLOOD PRESSURE: 76 MMHG | HEART RATE: 63 BPM | OXYGEN SATURATION: 97 % | RESPIRATION RATE: 18 BRPM

## 2020-06-08 DIAGNOSIS — I10 ESSENTIAL HYPERTENSION: ICD-10-CM

## 2020-06-08 DIAGNOSIS — E78.00 PURE HYPERCHOLESTEROLEMIA: ICD-10-CM

## 2020-06-08 DIAGNOSIS — E03.9 ACQUIRED HYPOTHYROIDISM: Primary | ICD-10-CM

## 2020-06-08 PROCEDURE — 99214 OFFICE O/P EST MOD 30 MIN: CPT | Performed by: INTERNAL MEDICINE

## 2020-06-08 NOTE — PROGRESS NOTES
Subjective   Katharine Lantigua is a 60 y.o. male.     History of Present Illness     Here for f/u on:    Hypothyroid-his last TSH was 8/19.  Energy level is okay.  He is sleeping a little bit better with the Ambien.  He lost a lot of weight when he initially started HMR but did regain some.    TACO-he did see Dr. Amin in sleep clinic 2 months ago; he will have another sleep study.  Dr. Amin prescribed Belsomra but insurance would not cover without an Ambien trial first.  So patient is back on Ambien currently. Helps some.    Left hip replacement-he had this last year with , and is starting ot have some right hip pain, so not able to walk as much as he wants, though is walking some.  Does swim some too    He does take lasix about 5x/week; takes in the morning    BPH-he has been seen Dr. Carias.  Flomax was discontinued and Uroxatrol was tried.  He did have hypotension with possibly the Uroxatrol and he says he is on something else now but he is not sure the name of it    Bilateral great toes have been discolored and thickened over the last 3 to 4 months.  No trauma.  He has not taken anything for toenail fungus in the past    Current Outpatient Medications on File Prior to Visit   Medication Sig Dispense Refill   • ELIQUIS 5 MG tablet tablet take 1 tablet by mouth every 12 hours 180 tablet 3   • furosemide (LASIX) 40 MG tablet TAKE 1 TABLET BY MOUTH ONCE DAILY IF NEEDED FOR SWELLING 90 tablet 0   • levothyroxine (SYNTHROID, LEVOTHROID) 88 MCG tablet TAKE 1 TABLET BY MOUTH EVERY DAY 30 tablet 0   • losartan (COZAAR) 25 MG tablet Take 1 tablet by mouth Daily. 30 tablet 11   • metoprolol tartrate (LOPRESSOR) 25 MG tablet Take 1 tablet by mouth 2 (Two) Times a Day. 180 tablet 1   • naproxen sodium (ALEVE) 220 MG tablet Take 220 mg by mouth 2 (Two) Times a Day As Needed.     • sildenafil (VIAGRA) 100 MG tablet sildenafil 100 mg tablet   Take 1 tablet every day by oral route as needed.     • zolpidem (AMBIEN)  "10 MG tablet TAKE 1 TABLET BY MOUTH AT NIGHT AS NEEDED FOR SLEEP 30 tablet 2   • [DISCONTINUED] tamsulosin (FLOMAX) 0.4 MG capsule 24 hr capsule tamsulosin 0.4 mg capsule   Take 1 capsule every day by oral route.     • Suvorexant 10 MG tablet Take 1 tablet by mouth Every Night. 30 tablet 2   • [DISCONTINUED] albuterol sulfate  (90 Base) MCG/ACT inhaler Inhale 1 puff Every 6 (Six) Hours As Needed for Shortness of Air. 1 inhaler 11   • [DISCONTINUED] azithromycin (ZITHROMAX Z-NURYS) 250 MG tablet Take 2 tablets the first day, then 1 tablet daily for 4 days. 6 tablet 0   • [DISCONTINUED] buPROPion XL (WELLBUTRIN XL) 300 MG 24 hr tablet Take 450 mg by mouth Daily.     • [DISCONTINUED] cefuroxime (Ceftin) 250 MG tablet Take 1 tablet by mouth 2 (Two) Times a Day. 14 tablet 0   • [DISCONTINUED] finasteride (PROPECIA) 1 MG tablet Take 1 tablet by mouth Daily. 90 tablet 1     No current facility-administered medications on file prior to visit.        The following portions of the patient's history were reviewed and updated as appropriate: allergies, current medications, past family history, past medical history, past social history, past surgical history and problem list.    Review of Systems   Constitutional: Negative for activity change, appetite change, fever, unexpected weight gain and unexpected weight loss.   Respiratory: Negative for shortness of breath.    Cardiovascular: Positive for leg swelling (L>R intermittently). Negative for chest pain.   Musculoskeletal: Positive for arthralgias (right hip) and back pain (lower right back).   Skin: Negative.    Allergic/Immunologic: Negative for immunocompromised state.   Neurological: Negative for seizures, memory problem and confusion.   Psychiatric/Behavioral: Negative for agitation.       Objective   /76   Pulse 63   Temp 97.8 °F (36.6 °C) (Temporal)   Resp 18   Ht 188 cm (74\")   Wt 125 kg (276 lb)   SpO2 97%   BMI 35.44 kg/m²   Physical Exam "   Constitutional: He is oriented to person, place, and time. He appears well-developed and well-nourished. No distress.   HENT:   Head: Normocephalic and atraumatic.   Eyes: Pupils are equal, round, and reactive to light. Conjunctivae and EOM are normal. Right eye exhibits no discharge. Left eye exhibits no discharge.   Neck: Normal range of motion. Neck supple.   Cardiovascular: Normal rate, regular rhythm and intact distal pulses.   Pulmonary/Chest: Effort normal and breath sounds normal.   Musculoskeletal: He exhibits edema (trace left leg). He exhibits no tenderness or deformity.   Neurological: He is alert and oriented to person, place, and time. No cranial nerve deficit.   Skin: Skin is warm and dry. No rash noted. He is not diaphoretic.   Varicose veins in left leg epsecially  Bilateral great toenails with discoloration and thickening   Psychiatric: He has a normal mood and affect. His behavior is normal. Judgment and thought content normal.   Nursing note and vitals reviewed.        Assessment/Plan   Katharine was seen today for follow-up and nail problem.    Diagnoses and all orders for this visit:    Acquired hypothyroidism-we will go ahead and recheck his thyroid labs  -     TSH Rfx On Abnormal To Free T4; Future    Essential hypertension-good control  -     Cancel: CBC (No Diff)  -     Basic Metabolic Panel; Future  -     CBC (No Diff); Future    Pure hypercholesterolemia-Dr. Hanna has placed order for fasting labs.    Probable onychomycosis-if LFTs are normal with these labs we will send in Lamisil.  Discussed that it can take many months to see an improvement with Lamisil

## 2020-06-12 ENCOUNTER — LAB (OUTPATIENT)
Dept: LAB | Facility: HOSPITAL | Age: 60
End: 2020-06-12

## 2020-06-12 DIAGNOSIS — E78.00 PURE HYPERCHOLESTEROLEMIA: ICD-10-CM

## 2020-06-12 DIAGNOSIS — I10 ESSENTIAL HYPERTENSION: ICD-10-CM

## 2020-06-12 DIAGNOSIS — E03.9 ACQUIRED HYPOTHYROIDISM: ICD-10-CM

## 2020-06-12 LAB
ALBUMIN SERPL-MCNC: 4.3 G/DL (ref 3.5–5.2)
ALP SERPL-CCNC: 51 U/L (ref 39–117)
ALT SERPL W P-5'-P-CCNC: 21 U/L (ref 1–41)
ANION GAP SERPL CALCULATED.3IONS-SCNC: 10.3 MMOL/L (ref 5–15)
AST SERPL-CCNC: 15 U/L (ref 1–40)
BILIRUB CONJ SERPL-MCNC: <0.2 MG/DL (ref 0.2–0.3)
BILIRUB INDIRECT SERPL-MCNC: ABNORMAL MG/DL
BILIRUB SERPL-MCNC: 0.2 MG/DL (ref 0.2–1.2)
BUN BLD-MCNC: 21 MG/DL (ref 8–23)
BUN/CREAT SERPL: 21 (ref 7–25)
CALCIUM SPEC-SCNC: 9.1 MG/DL (ref 8.6–10.5)
CHLORIDE SERPL-SCNC: 106 MMOL/L (ref 98–107)
CHOLEST SERPL-MCNC: 220 MG/DL (ref 0–200)
CO2 SERPL-SCNC: 26.7 MMOL/L (ref 22–29)
CREAT BLD-MCNC: 1 MG/DL (ref 0.76–1.27)
DEPRECATED RDW RBC AUTO: 45.2 FL (ref 37–54)
ERYTHROCYTE [DISTWIDTH] IN BLOOD BY AUTOMATED COUNT: 13.3 % (ref 12.3–15.4)
GFR SERPL CREATININE-BSD FRML MDRD: 76 ML/MIN/1.73
GLUCOSE BLD-MCNC: 106 MG/DL (ref 65–99)
HCT VFR BLD AUTO: 42.4 % (ref 37.5–51)
HDLC SERPL-MCNC: 64 MG/DL (ref 40–60)
HGB BLD-MCNC: 14.8 G/DL (ref 13–17.7)
LDLC SERPL CALC-MCNC: 134 MG/DL (ref 0–100)
LDLC/HDLC SERPL: 2.09 {RATIO}
MCH RBC QN AUTO: 32.1 PG (ref 26.6–33)
MCHC RBC AUTO-ENTMCNC: 34.9 G/DL (ref 31.5–35.7)
MCV RBC AUTO: 92 FL (ref 79–97)
PLATELET # BLD AUTO: 263 10*3/MM3 (ref 140–450)
PMV BLD AUTO: 10.8 FL (ref 6–12)
POTASSIUM BLD-SCNC: 4.7 MMOL/L (ref 3.5–5.2)
PROT SERPL-MCNC: 6.5 G/DL (ref 6–8.5)
RBC # BLD AUTO: 4.61 10*6/MM3 (ref 4.14–5.8)
SODIUM BLD-SCNC: 143 MMOL/L (ref 136–145)
T4 FREE SERPL-MCNC: 1.13 NG/DL (ref 0.93–1.7)
TRIGL SERPL-MCNC: 111 MG/DL (ref 0–150)
TSH SERPL DL<=0.05 MIU/L-ACNC: 5.4 UIU/ML (ref 0.27–4.2)
VLDLC SERPL-MCNC: 22.2 MG/DL (ref 5–40)
WBC NRBC COR # BLD: 6.13 10*3/MM3 (ref 3.4–10.8)

## 2020-06-12 PROCEDURE — 84443 ASSAY THYROID STIM HORMONE: CPT

## 2020-06-12 PROCEDURE — 85027 COMPLETE CBC AUTOMATED: CPT

## 2020-06-12 PROCEDURE — 80048 BASIC METABOLIC PNL TOTAL CA: CPT

## 2020-06-12 PROCEDURE — 36415 COLL VENOUS BLD VENIPUNCTURE: CPT

## 2020-06-12 PROCEDURE — 84439 ASSAY OF FREE THYROXINE: CPT

## 2020-06-12 PROCEDURE — 80061 LIPID PANEL: CPT

## 2020-06-12 PROCEDURE — 80076 HEPATIC FUNCTION PANEL: CPT

## 2020-06-14 RX ORDER — TERBINAFINE HYDROCHLORIDE 250 MG/1
250 TABLET ORAL DAILY
Qty: 30 TABLET | Refills: 2 | Status: SHIPPED | OUTPATIENT
Start: 2020-06-14 | End: 2020-10-14

## 2020-06-14 RX ORDER — LEVOTHYROXINE SODIUM 0.1 MG/1
100 TABLET ORAL DAILY
Qty: 90 TABLET | Refills: 0 | Status: SHIPPED | OUTPATIENT
Start: 2020-06-14 | End: 2020-09-15

## 2020-06-18 ENCOUNTER — TELEPHONE (OUTPATIENT)
Dept: CARDIOLOGY | Facility: CLINIC | Age: 60
End: 2020-06-18

## 2020-06-18 RX ORDER — ROSUVASTATIN CALCIUM 10 MG/1
10 TABLET, COATED ORAL DAILY
Qty: 30 TABLET | Refills: 11 | Status: SHIPPED | OUTPATIENT
Start: 2020-06-18 | End: 2021-06-22 | Stop reason: SDUPTHER

## 2020-06-18 NOTE — TELEPHONE ENCOUNTER
----- Message from Gabriel Hanna MD sent at 6/17/2020  8:37 PM EDT -----  Please let the patient know his cholesterol is unfortunately up again. Would recommend starting Crestor 10mg QHS.

## 2020-06-24 RX ORDER — LEVOTHYROXINE SODIUM 88 UG/1
TABLET ORAL
Qty: 30 TABLET | Refills: 0 | OUTPATIENT
Start: 2020-06-24

## 2020-07-13 ENCOUNTER — TELEPHONE (OUTPATIENT)
Dept: INTERNAL MEDICINE | Facility: CLINIC | Age: 60
End: 2020-07-13

## 2020-07-13 NOTE — TELEPHONE ENCOUNTER
I had sent him a [employer] message after the labs in June:  Thyroid level is a little low (high TSH), so I am going to raise your synthroid to 100mcg, and let's plan to recheck the thyroid labs in 3 months   Kidney/liver function is normal. I will go ahead and send in the lamisil for your toe nails     If he doesn't want to go into [employer], I can mail letter too

## 2020-07-13 NOTE — TELEPHONE ENCOUNTER
Patient called and stated he never got his lab results back. He states that he last had lab work done on 06/12/2020. He would like to know what his results are. The patient would also like to receive his results by mail. He states usually Gail Chandler writes notes on his lab results before mailing them. Please advise.     Patient call back [phone]

## 2020-07-14 ENCOUNTER — TELEPHONE (OUTPATIENT)
Dept: SLEEP MEDICINE | Facility: HOSPITAL | Age: 60
End: 2020-07-14

## 2020-07-14 ENCOUNTER — TELEPHONE (OUTPATIENT)
Dept: INTERNAL MEDICINE | Facility: CLINIC | Age: 60
End: 2020-07-14

## 2020-07-14 NOTE — TELEPHONE ENCOUNTER
Patient was originally prescribed belsomra but his insurance denied. He was then prescribed ambien but states that it is not working for him. He was wondering if he could get prescribed belsomra since he tried and failed ambien.

## 2020-07-14 NOTE — TELEPHONE ENCOUNTER
Patient wants to get advice/guidance on getting tested for covid19. He stated he does not have any of the covid19 symptoms. He has not been exposed to anyone who has tested positive for covid19. His daughter just came back from [place], not showing symptoms or anything but is still concerned. He just wants to know if him and his daughter should get tested.   [phone]

## 2020-07-14 NOTE — TELEPHONE ENCOUNTER
Called and spoke to pt regarding concerns. Stated his 17yr old daughter returned from [place] in [place] Yesterday. States she was there for a week. Wasn't around anyone that they were aware of that was positive for covid or symptoms. Believes she was careful, however also knows she is 17 and may not have been as careful as he would. States both he and his daughter have not had any symptoms. Pt also states he does have ongoing fatigue, and recently had a mild HA recently that went away with Tylenol. Wondering if they should get tested. Informed that if he is having symptoms, advised that [facility] next to his providers office are testing those with symptoms. Informed of other places to get tested if he is not having symptoms.  Voiced understanding. Pt also asked about results from labs. Informed as ordered by provider in results and pt my chart message. Pt voiced understanding and had no further questions/concerns at this time.

## 2020-07-17 DIAGNOSIS — F51.04 PSYCHOPHYSIOLOGICAL INSOMNIA: Primary | ICD-10-CM

## 2020-07-17 NOTE — TELEPHONE ENCOUNTER
Mehnaz Amin MD [facility]  Sleep Medicine  Pulmonary and Critical Care Medicine

## 2020-08-06 PROCEDURE — U0003 INFECTIOUS AGENT DETECTION BY NUCLEIC ACID (DNA OR RNA); SEVERE ACUTE RESPIRATORY SYNDROME CORONAVIRUS 2 (SARS-COV-2) (CORONAVIRUS DISEASE [COVID-19]), AMPLIFIED PROBE TECHNIQUE, MAKING USE OF HIGH THROUGHPUT TECHNOLOGIES AS DESCRIBED BY CMS-2020-01-R: HCPCS | Performed by: NURSE PRACTITIONER

## 2020-08-09 ENCOUNTER — TELEPHONE (OUTPATIENT)
Dept: URGENT CARE | Facility: CLINIC | Age: 60
End: 2020-08-09

## 2020-08-09 NOTE — TELEPHONE ENCOUNTER
----- Message from Rebel Pino MD sent at 8/9/2020  9:20 AM EDT -----  COVID is not detected.  Please notify the patient.-Rebel Pino M.D.      ----- Message -----  From: Lab, Background User  Sent: 8/9/2020  12:06 AM EDT  To: [facility] Covid Results    Pt called and informed of negative covid result. Pt remains asymptomatic.

## 2020-09-10 RX ORDER — LOSARTAN POTASSIUM 25 MG/1
25 TABLET ORAL DAILY
Qty: 30 TABLET | Refills: 11 | Status: SHIPPED | OUTPATIENT
Start: 2020-09-10 | End: 2021-06-04 | Stop reason: ALTCHOICE

## 2020-09-11 RX ORDER — FUROSEMIDE 40 MG/1
TABLET ORAL
Qty: 90 TABLET | Refills: 0 | Status: SHIPPED | OUTPATIENT
Start: 2020-09-11 | End: 2022-07-18 | Stop reason: SDUPTHER

## 2020-09-13 DIAGNOSIS — I10 ESSENTIAL HYPERTENSION: Primary | ICD-10-CM

## 2020-09-13 DIAGNOSIS — E03.9 ACQUIRED HYPOTHYROIDISM: ICD-10-CM

## 2020-09-15 RX ORDER — LEVOTHYROXINE SODIUM 0.1 MG/1
100 TABLET ORAL DAILY
Qty: 14 TABLET | Refills: 0 | Status: SHIPPED | OUTPATIENT
Start: 2020-09-15 | End: 2020-11-03 | Stop reason: SDUPTHER

## 2020-09-16 NOTE — TELEPHONE ENCOUNTER
PN he is due for thyroid labs for his med refill and 2 weeks have been sent to the pharmacy for him.  He will come by tomorrow and get his labs drawn.

## 2020-10-14 ENCOUNTER — LAB (OUTPATIENT)
Dept: LAB | Facility: HOSPITAL | Age: 60
End: 2020-10-14

## 2020-10-14 ENCOUNTER — TELEPHONE (OUTPATIENT)
Dept: INTERNAL MEDICINE | Facility: CLINIC | Age: 60
End: 2020-10-14

## 2020-10-14 ENCOUNTER — HOSPITAL ENCOUNTER (OUTPATIENT)
Dept: GENERAL RADIOLOGY | Facility: HOSPITAL | Age: 60
Discharge: HOME | End: 2020-10-14

## 2020-10-14 ENCOUNTER — OFFICE VISIT (OUTPATIENT)
Dept: INTERNAL MEDICINE | Facility: CLINIC | Age: 60
End: 2020-10-14

## 2020-10-14 VITALS
BODY MASS INDEX: 39.91 KG/M2 | TEMPERATURE: 97.1 F | HEIGHT: 74 IN | SYSTOLIC BLOOD PRESSURE: 134 MMHG | HEART RATE: 61 BPM | WEIGHT: 311 LBS | OXYGEN SATURATION: 97 % | DIASTOLIC BLOOD PRESSURE: 88 MMHG

## 2020-10-14 DIAGNOSIS — J98.8 MILD AIRFLOW OBSTRUCTION ON PULMONARY FUNCTION TEST: ICD-10-CM

## 2020-10-14 DIAGNOSIS — I10 ESSENTIAL HYPERTENSION: ICD-10-CM

## 2020-10-14 DIAGNOSIS — R06.02 SOB (SHORTNESS OF BREATH): ICD-10-CM

## 2020-10-14 DIAGNOSIS — E78.00 PURE HYPERCHOLESTEROLEMIA: ICD-10-CM

## 2020-10-14 DIAGNOSIS — R06.02 SOB (SHORTNESS OF BREATH): Primary | ICD-10-CM

## 2020-10-14 DIAGNOSIS — E03.9 ACQUIRED HYPOTHYROIDISM: ICD-10-CM

## 2020-10-14 DIAGNOSIS — R94.2 MILD AIRFLOW OBSTRUCTION ON PULMONARY FUNCTION TEST: ICD-10-CM

## 2020-10-14 DIAGNOSIS — Z23 NEED FOR INFLUENZA VACCINATION: ICD-10-CM

## 2020-10-14 DIAGNOSIS — I48.0 PAROXYSMAL ATRIAL FIBRILLATION (HCC): ICD-10-CM

## 2020-10-14 LAB
ANION GAP SERPL CALCULATED.3IONS-SCNC: 8.4 MMOL/L (ref 5–15)
BUN SERPL-MCNC: 21 MG/DL (ref 8–23)
BUN/CREAT SERPL: 21.2 (ref 7–25)
CALCIUM SPEC-SCNC: 9.3 MG/DL (ref 8.6–10.5)
CHLORIDE SERPL-SCNC: 109 MMOL/L (ref 98–107)
CHOLEST SERPL-MCNC: 173 MG/DL (ref 0–200)
CO2 SERPL-SCNC: 26.6 MMOL/L (ref 22–29)
CREAT SERPL-MCNC: 0.99 MG/DL (ref 0.76–1.27)
DEPRECATED RDW RBC AUTO: 45.6 FL (ref 37–54)
ERYTHROCYTE [DISTWIDTH] IN BLOOD BY AUTOMATED COUNT: 13.3 % (ref 12.3–15.4)
GFR SERPL CREATININE-BSD FRML MDRD: 77 ML/MIN/1.73
GLUCOSE SERPL-MCNC: 108 MG/DL (ref 65–99)
HCT VFR BLD AUTO: 42.1 % (ref 37.5–51)
HDLC SERPL-MCNC: 70 MG/DL (ref 40–60)
HGB BLD-MCNC: 14.3 G/DL (ref 13–17.7)
LDLC SERPL CALC-MCNC: 87 MG/DL (ref 0–100)
LDLC/HDLC SERPL: 1.23 {RATIO}
MCH RBC QN AUTO: 32 PG (ref 26.6–33)
MCHC RBC AUTO-ENTMCNC: 34 G/DL (ref 31.5–35.7)
MCV RBC AUTO: 94.2 FL (ref 79–97)
NT-PROBNP SERPL-MCNC: 610.1 PG/ML (ref 0–900)
PLATELET # BLD AUTO: 257 10*3/MM3 (ref 140–450)
PMV BLD AUTO: 11 FL (ref 6–12)
POTASSIUM SERPL-SCNC: 4.5 MMOL/L (ref 3.5–5.2)
RBC # BLD AUTO: 4.47 10*6/MM3 (ref 4.14–5.8)
SODIUM SERPL-SCNC: 144 MMOL/L (ref 136–145)
TRIGL SERPL-MCNC: 86 MG/DL (ref 0–150)
TSH SERPL DL<=0.05 MIU/L-ACNC: 3.26 UIU/ML (ref 0.27–4.2)
VLDLC SERPL-MCNC: 16 MG/DL (ref 5–40)
WBC # BLD AUTO: 6.03 10*3/MM3 (ref 3.4–10.8)

## 2020-10-14 PROCEDURE — 83880 ASSAY OF NATRIURETIC PEPTIDE: CPT

## 2020-10-14 PROCEDURE — 90686 IIV4 VACC NO PRSV 0.5 ML IM: CPT | Performed by: INTERNAL MEDICINE

## 2020-10-14 PROCEDURE — 99214 OFFICE O/P EST MOD 30 MIN: CPT | Performed by: INTERNAL MEDICINE

## 2020-10-14 PROCEDURE — 90471 IMMUNIZATION ADMIN: CPT | Performed by: INTERNAL MEDICINE

## 2020-10-14 PROCEDURE — 71046 X-RAY EXAM CHEST 2 VIEWS: CPT

## 2020-10-14 PROCEDURE — 80048 BASIC METABOLIC PNL TOTAL CA: CPT

## 2020-10-14 PROCEDURE — 84443 ASSAY THYROID STIM HORMONE: CPT

## 2020-10-14 PROCEDURE — 80061 LIPID PANEL: CPT

## 2020-10-14 PROCEDURE — U0003 INFECTIOUS AGENT DETECTION BY NUCLEIC ACID (DNA OR RNA); SEVERE ACUTE RESPIRATORY SYNDROME CORONAVIRUS 2 (SARS-COV-2) (CORONAVIRUS DISEASE [COVID-19]), AMPLIFIED PROBE TECHNIQUE, MAKING USE OF HIGH THROUGHPUT TECHNOLOGIES AS DESCRIBED BY CMS-2020-01-R: HCPCS | Performed by: INTERNAL MEDICINE

## 2020-10-14 PROCEDURE — 85027 COMPLETE CBC AUTOMATED: CPT

## 2020-10-14 PROCEDURE — 36415 COLL VENOUS BLD VENIPUNCTURE: CPT

## 2020-10-14 RX ORDER — SILODOSIN 8 MG/1
1 CAPSULE ORAL DAILY
COMMUNITY
Start: 2020-10-02 | End: 2022-07-18 | Stop reason: SINTOL

## 2020-10-14 RX ORDER — MELOXICAM 7.5 MG/1
1 TABLET ORAL NIGHTLY PRN
COMMUNITY
End: 2020-10-14

## 2020-10-14 RX ORDER — ALBUTEROL SULFATE 90 UG/1
2 AEROSOL, METERED RESPIRATORY (INHALATION) EVERY 4 HOURS PRN
Qty: 18 G | Refills: 11 | Status: SHIPPED | OUTPATIENT
Start: 2020-10-14 | End: 2021-09-03 | Stop reason: SDUPTHER

## 2020-10-14 NOTE — TELEPHONE ENCOUNTER
He was in  normal sinus rhythm today.  It is just one of his diagnoses since he has had atrial fibrillation in the past, but it does not mean he had it today

## 2020-10-14 NOTE — PROGRESS NOTES
Subjective   Katharine Lantigua is a 60 y.o. male.     History of Present Illness     SOB-over the last 2 weeks.  He has regained a lot of weight, over 30 pounds, since the summer and he thought the breathing was because of the weight gain.  It can occur even at rest.  He also has had a cough for the last 2 weeks.  Not been able to exercise much because of the arthritis and and the SOB. He is doing some PT currently.   He hasn't felt like he has been sick, no fever. Cough is nonproductive, present day and night. No palpitations. Some discomfort in chest from coughing but no chest pain  No  Known exposure to covid-19. No loss of smell or taste. Was tested for covid-19 2 months ago and was neg  his last visit w/ Dr Hanna was 4/20 and he does have f/u there in 2 weeks. He has h/o paroxysmal atrial fibrillation status post successful cryoablation of the pulmonary veins in 11/2015 Dr Silveira. His last stress test was normal in 3/19 w/ EF 69%.  He did have PFTs in 10/2019 that showed mild airway obstruction with improvement after a bronchodilator. No h/o tobacco abuse or asthma. We had started albuterol last year after the PFTs resulted. He did not use that much as he didn't feel like it helped, but has not used recently  CXR done last year showed atelectasis/scarring but no acute findings  He is on lasix 40mg as needed - not every day - takes about 1-2x/week.  He feels like his swelling in his legs is better.  Doesn't notice that the Lasix helps w/ breathing when he takes  He will try HMR again to lose weight  Sleeps w/ several pillows - no change. Does feel some SOB at night though tends to wake up frequently w/ pain more so than because of his breathing.    Hypothyroid - last tsh was 6/20 and was in 5 range and we did raise dose to 100 and he is due for repeat thyroid labs today.  He has been out about 3 days    Hip arthritis - he is on mobic from Dr Allen about 2-3x/week. Has been having more hip pain. Will see him  again in 11/20    TACO - he will do a home sleep study,and he is seeing Dr Amin. He had been using belsomra but is out- waiting to get the repeat sleep study    Hypertension-blood pressures have been okay though occasionally will get a higher reading about 150/90 but not often    Current Outpatient Medications on File Prior to Visit   Medication Sig Dispense Refill   • apixaban (Eliquis) 5 MG tablet tablet Take 1 tablet by mouth Every 12 (Twelve) Hours. 180 tablet 3   • furosemide (LASIX) 40 MG tablet TAKE 1 TABLET BY MOUTH ONCE DAILY IF NEEDED FOR SWELLING 90 tablet 0   • levothyroxine (SYNTHROID, LEVOTHROID) 100 MCG tablet TAKE 1 TABLET BY MOUTH DAILY 14 tablet 0   • losartan (Cozaar) 25 MG tablet Take 1 tablet by mouth Daily. 30 tablet 11   • metoprolol tartrate (LOPRESSOR) 25 MG tablet Take 1 tablet by mouth 2 (Two) Times a Day. 180 tablet 1   • naproxen sodium (ALEVE) 220 MG tablet Take 220 mg by mouth 2 (Two) Times a Day As Needed.     • rosuvastatin (CRESTOR) 10 MG tablet Take 1 tablet by mouth Daily. 30 tablet 11   • sildenafil (VIAGRA) 100 MG tablet sildenafil 100 mg tablet   Take 1 tablet every day by oral route as needed.     • meloxicam (MOBIC) 7.5 MG tablet 1 tablet At Night As Needed.     • silodosin (RAPAFLO) 8 MG capsule capsule 1 capsule Daily.     • Suvorexant 10 MG tablet Take 1 tablet by mouth At Night As Needed (sleep). 30 tablet 2   • [DISCONTINUED] Suvorexant 10 MG tablet Take 1 tablet by mouth Every Night. 30 tablet 2   • [DISCONTINUED] terbinafine (LamISIL) 250 MG tablet Take 1 tablet by mouth Daily. 30 tablet 2     No current facility-administered medications on file prior to visit.        The following portions of the patient's history were reviewed and updated as appropriate: allergies, current medications, past family history, past medical history, past social history, past surgical history and problem list.    Review of Systems   Constitutional: Positive for activity change, fatigue and  "unexpected weight gain. Negative for fever and unexpected weight loss.   HENT: Negative for congestion.    Respiratory: Positive for apnea, cough and shortness of breath. Negative for choking and chest tightness.    Cardiovascular: Positive for leg swelling (some, but not bad). Negative for chest pain and palpitations.   Gastrointestinal: Positive for abdominal pain (some w/ mobic so cut back). Negative for abdominal distention, anal bleeding, blood in stool, constipation and diarrhea.   Musculoskeletal: Positive for arthralgias.   Allergic/Immunologic: Positive for environmental allergies. Negative for immunocompromised state.   Psychiatric/Behavioral: Positive for sleep disturbance and stress.       Objective   /88 (BP Location: Left arm, Patient Position: Sitting)   Pulse 61   Temp 97.1 °F (36.2 °C) (Infrared)   Ht 188 cm (74\")   Wt (!) 141 kg (311 lb)   SpO2 97%   BMI 39.93 kg/m²   Physical Exam  Constitutional:       General: He is not in acute distress.     Appearance: Normal appearance. He is well-developed. He is obese. He is not toxic-appearing or diaphoretic.   HENT:      Head: Normocephalic and atraumatic.      Mouth/Throat:      Pharynx: No oropharyngeal exudate or posterior oropharyngeal erythema.   Eyes:      Conjunctiva/sclera: Conjunctivae normal.   Cardiovascular:      Rate and Rhythm: Normal rate and regular rhythm.      Pulses: Normal pulses.      Heart sounds: Normal heart sounds. No murmur. No friction rub. No gallop.       Comments: Rrr, rate 78  Pulmonary:      Effort: Pulmonary effort is normal. No respiratory distress.      Breath sounds: Normal breath sounds. No wheezing.   Abdominal:      General: Abdomen is flat. Bowel sounds are normal. There is no distension.      Palpations: Abdomen is soft. There is no mass.      Tenderness: There is no abdominal tenderness.   Musculoskeletal:         General: No swelling.      Right lower leg: No edema.      Left lower leg: No edema. "   Skin:     General: Skin is warm and dry.   Neurological:      Mental Status: He is alert and oriented to person, place, and time. Mental status is at baseline.   Psychiatric:         Mood and Affect: Mood normal.         Behavior: Behavior normal.         Thought Content: Thought content normal.         Judgment: Judgment normal.           Assessment/Plan   Diagnoses and all orders for this visit:    1. SOB (shortness of breath) (Primary)-probably multifactorial related to his morbid obesity with recent weight gain of 30 pounds as well as untreated sleep apnea.  He does also have some obstruction by PFTs so we will restart albuterol to see if it helps.  We will check his blood work today.  Unlikely COVID-19 but we will check swab as well.  He does have a follow-up with his cardiologist in 2 weeks and may need repeat echo or stress test  -     CBC (No Diff); Future  -     BNP; Future  -     COVID-19,[facility] ROUTINE, NP/OP SWAB IN TRANSPORT MEDIA OR ESWAB 72 HR TAT - Swab, Nasopharynx; Future  -     XR Chest PA & Lateral; Future    2. Mild airflow obstruction on pulmonary function test-we will restart albuterol  -     albuterol sulfate  (90 Base) MCG/ACT inhaler; Inhale 2 puffs Every 4 (Four) Hours As Needed for Shortness of Air.  Dispense: 18 g; Refill: 11    3. Paroxysmal atrial fibrillation (CMS/HCC)-heart rate is regular today.  He is on Eliquis and I advised him to stop the meloxicam though he is not taking daily, and switch to Tylenol    4. Acquired hypothyroidism-we had raised dose so we will recheck levels today.    5. Need for influenza vaccination  -     FluLaval Quad >6 Months ([id number])    6. Pure hypercholesterolemia-recheck today  -     Lipid Panel; Future      Prev:  Flu shot today

## 2020-10-14 NOTE — TELEPHONE ENCOUNTER
PATIENT CAME BACK IN AFTER VISIT LOOKING AT HIS SUMMARY... HE SEEN WHERE HE HAD BEEN DIAGNOSED WITH Atrial fibrillation AND HE THOUGHT THAT THE PROVIDER SAID THAT HE DID NOT HAVE THAT SO HE WAS TRYING TO GET SOME CLARIFICATION... HE IS ASKING THAT SOMEONE CALL HIM TO DISCUSS THIS MATTER.

## 2020-10-16 ENCOUNTER — TELEPHONE (OUTPATIENT)
Dept: INTERNAL MEDICINE | Facility: CLINIC | Age: 60
End: 2020-10-16

## 2020-10-16 NOTE — TELEPHONE ENCOUNTER
----- Message from Gail Chandler MD sent at 10/15/2020  8:21 PM EDT -----  See message below, and can also let him know COVID-19 test was negative, thanks  ----- Message -----  From: Gail Chandler MD  Sent: 10/15/2020   8:18 PM EDT  To: Rima Bailey MA    Can you let him know, CXR is normal, and labs look good - cholesterol levels are good, thyroid labs are normal, and no signs of fluid overload on the labs. I will send in refill of his thyroid medicine.   We can see what Dr Hanna recommends when he sees him

## 2020-10-26 ENCOUNTER — OFFICE VISIT (OUTPATIENT)
Dept: CARDIOLOGY | Facility: CLINIC | Age: 60
End: 2020-10-26

## 2020-10-26 VITALS
BODY MASS INDEX: 40.43 KG/M2 | HEART RATE: 64 BPM | OXYGEN SATURATION: 95 % | TEMPERATURE: 97.8 F | HEIGHT: 74 IN | DIASTOLIC BLOOD PRESSURE: 64 MMHG | SYSTOLIC BLOOD PRESSURE: 116 MMHG | WEIGHT: 315 LBS

## 2020-10-26 DIAGNOSIS — I48.0 PAROXYSMAL ATRIAL FIBRILLATION (HCC): Primary | ICD-10-CM

## 2020-10-26 PROCEDURE — 99213 OFFICE O/P EST LOW 20 MIN: CPT | Performed by: INTERNAL MEDICINE

## 2020-10-26 NOTE — PROGRESS NOTES
" [facility] AT [address], [address]  [place], [place], [address] [phone]  WWW.[facility]           OUTPATIENT CLINIC FOLLOW UP NOTE    Patient Care Team:  Patient Care Team:  Gail Chandler MD as PCP - General  Gail Chandler MD as PCP - Family Medicine  Alex Zaragoza MD as Consulting Physician (Psychiatry)  Andrew Mtz MD as Consulting Physician (Orthopedic Surgery)  Emmanuel Carias MD as Consulting Physician (Urology)  Gabriel Hanna MD as Consulting Physician (Cardiology)    Subjective:   Reason for consultation:   Chief Complaint   Patient presents with   • PAF       HPI:    Katharine Lantigua is a 60 y.o. male.  Cardiac problem list:  1. Paroxysmal atrial fibrillation status post failed cardioversion, failed flecainide and dofetilide therapy, status post successful cryoablation of the pulmonary veins 11/2015 Dr Silveira  2. Essential hypertension  3. Orthostasis  4. Moderate TACO, couldn't tolerate CPAP or customized dental device. Follows up at [facility] sleep clinic  5. Morbid obesity  6. Surgical history  1. Left hip replacement Fall 2019    The patient presents for follow-up by telephone    Blood pressure has varied in the 100s to 150s at home.  Averaging probably in the 130s. \"AFib\" indicator has not been present.  Denies chest pain, palpitations.  No new dyspnea with exertion.  Chronic, mild stable shortness of air.  Has regained significant weight.  Has not been exercising as much.    Review of Systems:  Positive for mild chronic shortness of breath, intermittent lower extremity edema, sleep disturbances.    Negative for chest tightness, sorthopnea, PND,palpitations, lightheadedness, syncope.     PFSH:  Patient Active Problem List   Diagnosis   • Paroxysmal atrial fibrillation (CMS/HCC)   • Obesity   • TAOC on CPAP   • Acquired hypothyroidism   • Gout   • Insomnia   • Essential hypertension   • Pain in joint involving ankle and foot   • " "Ankle mass, right   • Venous stasis   • Plantar wart of right foot   • Nerve sheath tumor   • Shortness of breath   • Bilateral lower extremity edema   • Pure hypercholesterolemia   • Mild airflow obstruction on pulmonary function test         Current Outpatient Medications:   •  albuterol sulfate  (90 Base) MCG/ACT inhaler, Inhale 2 puffs Every 4 (Four) Hours As Needed for Shortness of Air., Disp: 18 g, Rfl: 11  •  apixaban (Eliquis) 5 MG tablet tablet, Take 1 tablet by mouth Every 12 (Twelve) Hours., Disp: 180 tablet, Rfl: 3  •  furosemide (LASIX) 40 MG tablet, TAKE 1 TABLET BY MOUTH ONCE DAILY IF NEEDED FOR SWELLING, Disp: 90 tablet, Rfl: 0  •  levothyroxine (SYNTHROID, LEVOTHROID) 100 MCG tablet, TAKE 1 TABLET BY MOUTH DAILY, Disp: 14 tablet, Rfl: 0  •  losartan (Cozaar) 25 MG tablet, Take 1 tablet by mouth Daily., Disp: 30 tablet, Rfl: 11  •  metoprolol tartrate (LOPRESSOR) 25 MG tablet, Take 1 tablet by mouth 2 (Two) Times a Day., Disp: 180 tablet, Rfl: 1  •  rosuvastatin (CRESTOR) 10 MG tablet, Take 1 tablet by mouth Daily., Disp: 30 tablet, Rfl: 11  •  sildenafil (VIAGRA) 100 MG tablet, As Needed., Disp: , Rfl:   •  silodosin (RAPAFLO) 8 MG capsule capsule, 1 capsule Daily., Disp: , Rfl:     No Known Allergies     reports that he has never smoked. He has never used smokeless tobacco.    Family History   Problem Relation Age of Onset   • Hypothyroidism Mother          in 80's   • Cancer Mother    • Cancer Father    • Diabetes type II Father    • Hypertension Father    • Coronary artery disease Father         60;s   • Obesity Other    • Coronary artery disease Other        Objective:   Blood pressure 116/64, pulse 64, temperature 97.8 °F (36.6 °C), height 188 cm (74\"), weight (!) 143 kg (315 lb 9.6 oz), SpO2 95 %.  CONSTITUTIONAL: No acute distress  RESPIRATORY: Normal effort. Clear to auscultation bilaterally without wheezing or rales  CARDIOVASCULAR: Regular rate and rhythm with normal S1 and " S2. Without murmur, gallop or rub.  PERIPHERAL VASCULAR: Normal radial pulse. There is no significant lower extremity edema bilaterally.  PSYCH: Normal affect and mood      Labs:  Lab Results   Component Value Date    ALT 21 06/12/2020    AST 15 06/12/2020     Lab Results   Component Value Date    CHOL 173 10/14/2020    TRIG 86 10/14/2020    HDL 70 (H) 10/14/2020    CREATININE 0.99 10/14/2020       Diagnostic Data:    Procedures    TTE 10/2018  · Left ventricular systolic function is normal. Estimated EF = 60%.  · Left ventricular wall thickness is consistent with mild concentric hypertrophy.  · Left atrial volume is mildly increased.    Stress test 4/2019  · Left ventricular ejection fraction is normal (Calculated EF = 69%).  · The exercise ECG portion of the stress test was negative for clinical and ECG evidence of myocardial ischemia. The Duke treadmill score was 8.3.  · The myocardial perfusion imaging portion of the stress test indicates a normal myocardial perfusion study with no evidence of ischemia.  · Impressions are consistent with a low risk study.    LLE Duplex US 8/2019 - negative for DVT or valvular insufficiency or reflux    PFTs 10/2019  Spirometry: Spirometry demonstrates mild airway obstruction.  Post Bronchodilator: Following the inhalation of a bronchodilator, there is significant improvement in airway mechanics.   MVV: N/A  Lung Volume: Lung volumes are consistent with mild restrictive lung disease.  Diffusion: The diffusing capacity for carbon monoxide, a reflection of alveolar-capillary gas transport, is normal.       Assessment and Plan:   Katharine was seen today for hypertension and atrial fibrillation.    Diagnoses and all orders for this visit:    Paroxysmal atrial fibrillation  -Status post pulmonary vein cryoablation in 11/2015 by Dr. Silveira, remains in a regular rhythm today.    -Continue metoprolol and apixaban    Shortness of Breath  Lower Extremity Edema  TACO, sleep  disturbances  Obesity due to excess calories, unspecified obesity severity  -Continue Lasix at 20 mg daily as needed.  -Previously followed by the [facility] sleep team. Did not tolerate CPAP or a custom-made dental device in the past.    -Now followed by Dr. Amin    -Resume [facility] diet plan  -Discussed the option of a bariatric referral again.  Holding off for now.  Patient to work on lifestyle modifications again.    Essential hypertension  Lightheadedness, positional, improved after prior medication dose reduction, 4/2019  -Continue current medications    Mixed hyperlipidemia  -Good response to Crestor, continue for now at current dosing    - Return in about 6 months (around 4/26/2021) for Next scheduled follow up with an EKG.    Gabriel Hanna MD, MSc, [facility]  Interventional Cardiology  [facility] at [facility]

## 2020-11-03 ENCOUNTER — TELEPHONE (OUTPATIENT)
Dept: INTERNAL MEDICINE | Facility: CLINIC | Age: 60
End: 2020-11-03

## 2020-11-03 RX ORDER — LEVOTHYROXINE SODIUM 0.1 MG/1
100 TABLET ORAL DAILY
Qty: 30 TABLET | Refills: 5 | Status: SHIPPED | OUTPATIENT
Start: 2020-11-03 | End: 2021-05-27

## 2020-11-03 NOTE — TELEPHONE ENCOUNTER
PT CALLED STATED THAT RX levothyroxine (SYNTHROID, LEVOTHROID) 100 MCG tablet, WAS SUPPOSE T BE GETTING CHANGED JUST DEPENDING ON WHAT HIS LAB RESULTS CAME BACK AS, PT STATED THAT PHARMACY HAS BE TRYING TO GET IN CONTACT WITH DR BUT NO RESPONSE I REGARDS TO THE RX REFILL. PT IS OUT OF THE RX   levothyroxine (SYNTHROID, LEVOTHROID) 100 MCG tablet AND NEEDS A REFILL BUT OT SURE IF RX NEEDS TO BE CHANGED.    PLEASE ADVISE.  CALL BACK:[phone]     [id number] - [address] AT [facility] & [address]

## 2020-11-09 ENCOUNTER — TELEPHONE (OUTPATIENT)
Dept: INTERNAL MEDICINE | Facility: CLINIC | Age: 60
End: 2020-11-09

## 2020-11-09 RX ORDER — OMEPRAZOLE 40 MG/1
40 CAPSULE, DELAYED RELEASE ORAL DAILY
Qty: 30 CAPSULE | Refills: 5 | Status: SHIPPED | OUTPATIENT
Start: 2020-11-09 | End: 2020-12-14

## 2020-11-09 NOTE — TELEPHONE ENCOUNTER
Spoke with pt, he uses the inhaler. 2 puffs BID, helps with SOA but not the coughing. He is still coughing quite a bit. Please advise?

## 2020-11-09 NOTE — TELEPHONE ENCOUNTER
I can send in an additional daily maintenance inhaler to see if it helps w/ the cough. He will want to rinse mouth after using. . He can use albuterol less often - just as needed if he feels SOB.  We can also try a reflux medication, as silent reflux can sometimes cause a cough, so I will send in prilosec to take every morning, and if he could schedule a f/u appt w/ me in 3-4 weeks, we can see how he is feeling and if cough/breathing are not better, we may do the PFT test again

## 2020-11-09 NOTE — TELEPHONE ENCOUNTER
Patient called and stated that he saw Dr. Chandler 10/14/20 and his shortness of breath and coughing has not gotten any better.  Patient would like advisement on what to do.    Patient callback:     Please advise

## 2020-11-25 ENCOUNTER — TELEPHONE (OUTPATIENT)
Dept: INTERNAL MEDICINE | Facility: CLINIC | Age: 60
End: 2020-11-25

## 2020-11-25 NOTE — TELEPHONE ENCOUNTER
Yes, it is a reflux medication.  We had started it at the beginning of November when he called with some of the breathing symptoms he was having to see if it would help, in case it was silent reflux causing them.  We did want him to follow-up in December to see how he was feeling and see if we need to do additional testing if he can schedule a f/u soon

## 2020-11-25 NOTE — TELEPHONE ENCOUNTER
PATIENT IS REQUESTING A CALL BACK ABOUT A MEDICATION THE PHARMACY HAD FOR HIM   omeprazole (PrilOSEC) 40 MG capsule  HE IS NOT FOR SURE WHAT IT IS FOR   PLEASE CALL     PATIENT CALL BACK  [phone]

## 2020-11-30 PROCEDURE — U0004 COV-19 TEST NON-CDC HGH THRU: HCPCS | Performed by: NURSE PRACTITIONER

## 2020-12-12 NOTE — PROGRESS NOTES
Subjective   Katharine Lantigua is a 60 y.o. male.     History of Present Illness     Here for f/u on:    SOB - He did do televisit w/ cardiology, Dr Hope, 2 months ago. Recommended weight loss and to continue lasix prn. cxr done 2m ago was normal  He did have mild obstruction on his PFTs in '19 and at last visit 2 months ago, we had him restart albuterol prn. We also added breo 1 month ago daily, and prilsoec in case silent reflux was contributing to his SOB. He does feel the Breo has helped-  cough is better and his breathing seems better ~still has dyspnea on exertion.  He has not used the albuterol inhaler.  He is not sure if Prilosec is helping or not.  Never had any reflux  His weight is up 10 lbs in last 2 months    Hypothyroid - we checked thyroid labs 2 months ago and tsh was 3.2.  He has gained weight but he feels it is because he cannot exercise    He had MRI lspinein 8/20 that showed l4-5 central and spinal stenosis.  His orthopedic, Dr PITTMAN, ordered it and he has started PT last few weeks. Has appt w/ Dr Santos in January that Ortho set up.  He takes extra strength Tylenol up to twice a day and is off NSAIDs.  He is experiencing low back pain on the right side that radiates into his right hip and upper thigh.    Obesity - he did start HMR back last week. He had lost 50 lbs before surgery but has regained.    TACO-he is still not being treated    Current Outpatient Medications on File Prior to Visit   Medication Sig Dispense Refill   • acetaminophen (TYLENOL) 500 MG tablet Take 1,000 mg by mouth 2 (Two) Times a Day.     • apixaban (Eliquis) 5 MG tablet tablet Take 1 tablet by mouth Every 12 (Twelve) Hours. 180 tablet 3   • Fluticasone Furoate-Vilanterol (Breo Ellipta) 100-25 MCG/INH inhaler Inhale 1 puff Daily. 1 inhaler 5   • furosemide (LASIX) 40 MG tablet TAKE 1 TABLET BY MOUTH ONCE DAILY IF NEEDED FOR SWELLING 90 tablet 0   • levothyroxine (SYNTHROID, LEVOTHROID) 100 MCG tablet Take 1 tablet by mouth Daily. 30  "tablet 5   • losartan (Cozaar) 25 MG tablet Take 1 tablet by mouth Daily. 30 tablet 11   • metoprolol tartrate (LOPRESSOR) 25 MG tablet Take 1 tablet by mouth 2 (Two) Times a Day. 180 tablet 1   • rosuvastatin (CRESTOR) 10 MG tablet Take 1 tablet by mouth Daily. 30 tablet 11   • silodosin (RAPAFLO) 8 MG capsule capsule 1 capsule Daily.     • [DISCONTINUED] omeprazole (PrilOSEC) 40 MG capsule Take 1 capsule by mouth Daily. 30 capsule 5   • [DISCONTINUED] sildenafil (VIAGRA) 100 MG tablet As Needed.     • albuterol sulfate  (90 Base) MCG/ACT inhaler Inhale 2 puffs Every 4 (Four) Hours As Needed for Shortness of Air. 18 g 11     No current facility-administered medications on file prior to visit.        The following portions of the patient's history were reviewed and updated as appropriate: allergies, current medications, past family history, past medical history, past social history, past surgical history and problem list.    Review of Systems   Constitutional: Positive for activity change, fatigue and unexpected weight gain. Negative for appetite change, fever and unexpected weight loss.   Respiratory: Positive for apnea and shortness of breath. Negative for cough.    Cardiovascular: Positive for leg swelling (will take lasix as needed about twice a week). Negative for chest pain and palpitations.   Gastrointestinal: Negative for GERD.   Allergic/Immunologic: Negative for immunocompromised state.   Psychiatric/Behavioral: Positive for sleep disturbance.       Objective   /78 (BP Location: Left arm, Patient Position: Sitting)   Pulse 66   Temp 97.3 °F (36.3 °C) (Infrared)   Ht 188 cm (74\")   Wt (!) 147 kg (324 lb 3.2 oz)   SpO2 96%   BMI 41.62 kg/m²   Physical Exam  Constitutional:       General: He is not in acute distress.     Appearance: Normal appearance. He is well-developed. He is obese. He is not diaphoretic.   HENT:      Head: Normocephalic and atraumatic.   Eyes:      Conjunctiva/sclera: " Conjunctivae normal.   Cardiovascular:      Rate and Rhythm: Normal rate and regular rhythm.      Heart sounds: Normal heart sounds. No murmur. No friction rub. No gallop.    Pulmonary:      Effort: Pulmonary effort is normal. No respiratory distress.      Breath sounds: Normal breath sounds. No wheezing.   Musculoskeletal:      Left lower leg: No edema.   Skin:     General: Skin is warm and dry.   Neurological:      Mental Status: He is alert and oriented to person, place, and time.   Psychiatric:         Mood and Affect: Mood normal.         Behavior: Behavior normal.         Thought Content: Thought content normal.         Judgment: Judgment normal.           Assessment/Plan   Diagnoses and all orders for this visit:    1. Mild airflow obstruction on pulmonary function test (Primary)-patient has had improvement in cough and Breo.  We will continue.  Discussed he can still use albuterol as needed.  He will continue trying to lose weight to improve his breathing.  We will go ahead and stop the omeprazole since patient did not feel like it made too much difference    2. Acquired hypothyroidism-recheck thyroid levels in 3 to 4 months    3. Essential hypertension-good control    4. DDD (degenerative disc disease), lumbar-she is currently undergoing physical therapy and has an appointment with neurosurgery next month.  We will continue the Tylenol as needed and avoid oral NSAIDs.  We will see if Flector patch is covered to help with the pain  -     Diclofenac Epolamine (Flector) 1.3 % patch patch; Apply 1 patch topically to the appropriate area as directed 2 (Two) Times a Day.  Dispense: 60 patch; Refill: 5

## 2020-12-14 ENCOUNTER — OFFICE VISIT (OUTPATIENT)
Dept: INTERNAL MEDICINE | Facility: CLINIC | Age: 60
End: 2020-12-14

## 2020-12-14 VITALS
DIASTOLIC BLOOD PRESSURE: 78 MMHG | HEIGHT: 74 IN | TEMPERATURE: 97.3 F | OXYGEN SATURATION: 96 % | HEART RATE: 66 BPM | BODY MASS INDEX: 40.43 KG/M2 | SYSTOLIC BLOOD PRESSURE: 118 MMHG | WEIGHT: 315 LBS

## 2020-12-14 DIAGNOSIS — J98.8 MILD AIRFLOW OBSTRUCTION ON PULMONARY FUNCTION TEST: Primary | ICD-10-CM

## 2020-12-14 DIAGNOSIS — E03.9 ACQUIRED HYPOTHYROIDISM: ICD-10-CM

## 2020-12-14 DIAGNOSIS — M51.36 DDD (DEGENERATIVE DISC DISEASE), LUMBAR: ICD-10-CM

## 2020-12-14 DIAGNOSIS — R94.2 MILD AIRFLOW OBSTRUCTION ON PULMONARY FUNCTION TEST: Primary | ICD-10-CM

## 2020-12-14 DIAGNOSIS — I10 ESSENTIAL HYPERTENSION: ICD-10-CM

## 2020-12-14 PROCEDURE — 99214 OFFICE O/P EST MOD 30 MIN: CPT | Performed by: INTERNAL MEDICINE

## 2020-12-14 RX ORDER — ACETAMINOPHEN 500 MG
1000 TABLET ORAL 2 TIMES DAILY
COMMUNITY
End: 2021-06-04 | Stop reason: ALTCHOICE

## 2020-12-14 RX ORDER — DICLOFENAC EPOLAMINE 0.01 G/1
1 SYSTEM TOPICAL 2 TIMES DAILY
Qty: 60 PATCH | Refills: 5 | Status: SHIPPED | OUTPATIENT
Start: 2020-12-14 | End: 2021-01-25

## 2020-12-16 ENCOUNTER — TELEPHONE (OUTPATIENT)
Dept: INTERNAL MEDICINE | Facility: CLINIC | Age: 60
End: 2020-12-16

## 2020-12-29 ENCOUNTER — TELEPHONE (OUTPATIENT)
Dept: INTERNAL MEDICINE | Facility: CLINIC | Age: 60
End: 2020-12-29

## 2021-01-01 PROCEDURE — U0004 COV-19 TEST NON-CDC HGH THRU: HCPCS | Performed by: FAMILY MEDICINE

## 2021-01-19 ENCOUNTER — TELEPHONE (OUTPATIENT)
Dept: INTERNAL MEDICINE | Facility: CLINIC | Age: 61
End: 2021-01-19

## 2021-01-19 NOTE — TELEPHONE ENCOUNTER
Caller: Katharine Lantigua    Relationship to patient: Self    Best call back number: [phone]    PATIENT WENT TO URGENT TREATMENT WITH COUGHING AND SORE THROAT. HAD A COVID TEST AND CAME BACK NEGATIVE.  HE WAS TOLD BY URGENT TREATMENT TO SCHEDULE APPOINTMENT WITH PCP.    PATIENT PREFERS TO BE SEEN IN OFFICE    PLEASE ADVISE

## 2021-01-21 NOTE — TELEPHONE ENCOUNTER
I have tried to call him a couple of times with no return call.  I have left him a message and I sent him a my chart message today.

## 2021-01-25 ENCOUNTER — OFFICE VISIT (OUTPATIENT)
Dept: INTERNAL MEDICINE | Facility: CLINIC | Age: 61
End: 2021-01-25

## 2021-01-25 VITALS
DIASTOLIC BLOOD PRESSURE: 94 MMHG | HEART RATE: 69 BPM | SYSTOLIC BLOOD PRESSURE: 128 MMHG | WEIGHT: 315 LBS | BODY MASS INDEX: 42.66 KG/M2 | TEMPERATURE: 97.1 F | OXYGEN SATURATION: 96 % | HEIGHT: 72 IN

## 2021-01-25 DIAGNOSIS — E66.01 MORBID OBESITY (HCC): Primary | Chronic | ICD-10-CM

## 2021-01-25 DIAGNOSIS — R06.02 SHORTNESS OF BREATH: Chronic | ICD-10-CM

## 2021-01-25 DIAGNOSIS — G47.33 OSA (OBSTRUCTIVE SLEEP APNEA): Chronic | ICD-10-CM

## 2021-01-25 PROCEDURE — 99214 OFFICE O/P EST MOD 30 MIN: CPT | Performed by: INTERNAL MEDICINE

## 2021-01-25 NOTE — PROGRESS NOTES
Chief Complaint  Cough (some productive but not much), Wheezing (has had symptoms for over a month now), Sore Throat (does have sleep apnea and sleeps with his mouth open, it is worse in the mornings), and Shortness of Breath (has all the time with minimal exercise)    Subjective          Katharine Lantigua presents to [facility] for   History of Present Illness    URI symptoms - pt was seen at [facility] last week and covid test was negative. He was given tessalon perles and promethazine/DM. He had also been seen just over 3 weeks ago and given cefdinir. CXR was negative.  He has not had fever or myalgias.  No known Covid exposure.  He did not feel the antibiotic helped.  Cough is mostly dry but occasionally will bring up some mucus.  We had done a trial of Prilosec for about a month from November to December of last year but he did not feel like it helped with the cough so did stop it.  He notices the cough more at night.  No association with food.  He continues to have the cough, SOB, wheezing, sore throat  Will use albuterol and breo at same time in the morning and cannot tell if they help any.    TACO -untreated -  he does not feel he can tolerate mask.     SOB - He did do televisit w/ cardiology, Dr Hanna, 3 months ago. Recommended weight loss and to continue lasix prn. He did have mild obstruction on his PFTs in '19   He uses lasix prn when he notices swelling. He is not sure if it helps with his breathing on the days he takes the Lasix.    Morbid obesity - he had considered HMR but decided he could not do again.  He does feel like he binge eats at night.  He had been able to lose weight before with exercise but because of his back pain he is not able to walk.    LBP - he did get an epidural a few weeks ago. Has not helped yet but was told it could take some time. Will see Dr Vaughn again in 2/21        Objective   Vital Signs:   /94 (BP Location: Right arm, Patient Position:  "Sitting)   Pulse 69   Temp 97.1 °F (36.2 °C) (Infrared)   Ht 182.9 cm (72\")   Wt (!) 153 kg (337 lb 9.6 oz)   SpO2 96%   BMI 45.79 kg/m²     Physical Exam   Constitutional: oriented to person, place, and time.  well-developed and well-nourished. No distress.   HENT:   Head: Normocephalic and atraumatic.   Eyes: Conjunctivae and EOM are normal.   Cardiovascular: Normal rate, regular rhythm and normal heart sounds.  Exam reveals no gallop and no friction rub.    No murmur heard.  Pulmonary/Chest: Effort normal and breath sounds normal. No respiratory distress.   no wheezes today.   Neurological:  alert and oriented to person, place, and time.   Skin: Skin is warm and dry. not diaphoretic.   Psychiatric:  normal mood and affect. behavior is normal. Judgment and thought content normal.   Recheck BP-130/86  Result Review :   The following data was reviewed by: Gail Chandler MD on 01/25/2021:  Common labs    Common Labsle 6/12/20 6/12/20 6/12/20 6/12/20 10/14/20 10/14/20 10/14/20    0936 0936 0936 0936 0932 0932 0932   BUN    21  21    Creatinine    1.00  0.99    eGFR Non African Am    76  77    Sodium    143  144    Potassium    4.7  4.5    Chloride    106  109 (A)    Calcium    9.1  9.3    Albumin   4.30       Total Bilirubin   0.2       Alkaline Phosphatase   51       AST (SGOT)   15       ALT (SGPT)   21       WBC 6.13    6.03     Hemoglobin 14.8    14.3     Hematocrit 42.4    42.1     Platelets 263    257     Triglycerides  111     86   HDL Cholesterol  64 (A)     70 (A)   LDL Cholesterol   134 (A)     87   (A) Abnormal value            Data reviewed: Cardiology studies cardiology note          Assessment and Plan    Problem List Items Addressed This Visit        Pulmonary and Pneumonias    Shortness of breath-likely secondary to morbid obesity.  He does have mild obstruction on previous PFTs and will continue the Breo.  We did discuss trying to use the albuterol during the day when he feels short of breath " rather than in the morning with the Breo.  He would like to see pulmonary as well.  He has tested negative for Covid multiple times, and most of his symptoms have been chronic and are unlikely infectious    Relevant Orders    Ambulatory Referral to Pulmonology      Other Visit Diagnoses     Morbid obesity (CMS/HCC)  (Chronic)   -  Primary-patient  needs to lose weight with all his comorbidities which have worsened with his weight gain.  We discussed seeing bariatrics but he does not want to do this.  He had luck with HMR in the past but is not motivated to try this again.  I did give him Kiki Larios's website-perhaps her program would work well for patient..    TACO (obstructive sleep apnea)  (Chronic)   -untreated and likely exacerbating his multiple health issues.  He would like to get back in with sleep clinic and see what options are for him.    Relevant Orders    Ambulatory Referral to Sleep Medicine          Follow Up   No follow-ups on file.  Patient was given instructions and counseling regarding his condition or for health maintenance advice. Please see specific information pulled into the AVS if appropriate.

## 2021-01-29 ENCOUNTER — LAB (OUTPATIENT)
Dept: PULMONOLOGY | Facility: CLINIC | Age: 61
End: 2021-01-29

## 2021-01-29 DIAGNOSIS — Z01.812 BLOOD TESTS PRIOR TO TREATMENT OR PROCEDURE: Primary | ICD-10-CM

## 2021-01-29 PROCEDURE — 99211 OFF/OP EST MAY X REQ PHY/QHP: CPT | Performed by: NURSE PRACTITIONER

## 2021-01-29 PROCEDURE — U0004 COV-19 TEST NON-CDC HGH THRU: HCPCS | Performed by: NURSE PRACTITIONER

## 2021-01-30 LAB — SARS-COV-2 RNA RESP QL NAA+PROBE: NOT DETECTED

## 2021-02-01 ENCOUNTER — OFFICE VISIT (OUTPATIENT)
Dept: PULMONOLOGY | Facility: CLINIC | Age: 61
End: 2021-02-01

## 2021-02-01 VITALS
OXYGEN SATURATION: 94 % | SYSTOLIC BLOOD PRESSURE: 120 MMHG | WEIGHT: 315 LBS | HEART RATE: 70 BPM | HEIGHT: 72 IN | DIASTOLIC BLOOD PRESSURE: 80 MMHG | TEMPERATURE: 97.3 F | BODY MASS INDEX: 42.66 KG/M2

## 2021-02-01 DIAGNOSIS — R53.81 PHYSICAL DECONDITIONING: ICD-10-CM

## 2021-02-01 DIAGNOSIS — G47.33 OSA (OBSTRUCTIVE SLEEP APNEA): ICD-10-CM

## 2021-02-01 DIAGNOSIS — R06.02 SHORTNESS OF BREATH: Primary | ICD-10-CM

## 2021-02-01 DIAGNOSIS — E66.01 MORBID OBESITY WITH BMI OF 45.0-49.9, ADULT (HCC): ICD-10-CM

## 2021-02-01 PROCEDURE — 94729 DIFFUSING CAPACITY: CPT | Performed by: NURSE PRACTITIONER

## 2021-02-01 PROCEDURE — 99214 OFFICE O/P EST MOD 30 MIN: CPT | Performed by: NURSE PRACTITIONER

## 2021-02-01 PROCEDURE — 94060 EVALUATION OF WHEEZING: CPT | Performed by: NURSE PRACTITIONER

## 2021-02-01 PROCEDURE — 94726 PLETHYSMOGRAPHY LUNG VOLUMES: CPT | Performed by: NURSE PRACTITIONER

## 2021-02-01 PROCEDURE — 94618 PULMONARY STRESS TESTING: CPT | Performed by: NURSE PRACTITIONER

## 2021-02-01 RX ORDER — ALBUTEROL SULFATE 90 UG/1
4 AEROSOL, METERED RESPIRATORY (INHALATION) ONCE
Status: COMPLETED | OUTPATIENT
Start: 2021-02-01 | End: 2021-02-01

## 2021-02-01 RX ADMIN — ALBUTEROL SULFATE 4 PUFF: 90 AEROSOL, METERED RESPIRATORY (INHALATION) at 09:13

## 2021-02-01 NOTE — PROGRESS NOTES
[facility] Pulmonary Evaluation    CHIEF COMPLAINT    shortness of breath     Refered by:  Gail Chandler MD        HISTORY OF PRESENT ILLNESS    Katharine Lantigua is a 60 y.o.male here today for shortness of breath.     He has noticed some worsening shortness of breath over the last few years but in the last several months it has gotten significantly noticeable.  He has had quite a bit of a weight gain in the last several months.  He had hip surgery and then was very deconditioned after that and now has herniated disc.  He has very minimal activity at this time.    He does get short of breath with his activities of daily living.      He also complains of a cough for the last 1 to 2 months.  He has been both to primary care and urgent treatment center.  He has completed a round of antibiotics.  He is used Tessalon Perles and Phenergan cough syrup without much benefit.  He feels like the cough is most all the time.  He does not related to any specific time of the day or with activity.  There is no changes with lying down or after meals.    The cough is largely nonproductive but occasionally he has some clear sputum.  He does occasionally hear some wheezing.      He did have pulmonary function test in October 2019 that showed some mild airway obstruction and he has been on Breo.  The Breo did not help cough, it did have some minimal improvement on the shortness of breath.   He is also been using albuterol HFA.  But he is not sure he has been using it correctly.  The respiratory therapist today in the office instructed him on how to use it.  He has not noticed that helping his shortness of breath.    He does complain of some seasonal allergies.  No other history of lung disease, asthma, smoking, or occupational exposures.    He follows up with Dr. Amin at the sleep clinic.  He does have moderate obstructive airway disease but cannot tolerate CPAP or dental device.  He did not follow-up after his last visit for a repeat  sleep study to possibly resume his CPAP.    He does follow-up with cardiology with a history of paroxysmal A. fib and had a pulmonary vein cryoablation in 2015.  No issues, as needed lasix - rare  No new edema.  No chest pain or pressure, no palpitations.    Patient Active Problem List   Diagnosis   • Paroxysmal atrial fibrillation (CMS/HCC)   • Obesity   • TACO (obstructive sleep apnea)   • Acquired hypothyroidism   • Gout   • Insomnia   • Essential hypertension   • Pain in joint involving ankle and foot   • Ankle mass, right   • Venous stasis   • Plantar wart of right foot   • Nerve sheath tumor   • Shortness of breath   • Bilateral lower extremity edema   • Pure hypercholesterolemia   • Mild airflow obstruction on pulmonary function test   • Morbid obesity with BMI of 45.0-49.9, adult (CMS/HCC)   • Physical deconditioning       No Known Allergies    Current Outpatient Medications:   •  acetaminophen (TYLENOL) 500 MG tablet, Take 1,000 mg by mouth 2 (Two) Times a Day., Disp: , Rfl:   •  albuterol sulfate  (90 Base) MCG/ACT inhaler, Inhale 2 puffs Every 4 (Four) Hours As Needed for Shortness of Air., Disp: 18 g, Rfl: 11  •  apixaban (Eliquis) 5 MG tablet tablet, Take 1 tablet by mouth Every 12 (Twelve) Hours., Disp: 180 tablet, Rfl: 3  •  Fluticasone Furoate-Vilanterol (Breo Ellipta) 100-25 MCG/INH inhaler, Inhale 1 puff Daily., Disp: 1 inhaler, Rfl: 5  •  furosemide (LASIX) 40 MG tablet, TAKE 1 TABLET BY MOUTH ONCE DAILY IF NEEDED FOR SWELLING, Disp: 90 tablet, Rfl: 0  •  levothyroxine (SYNTHROID, LEVOTHROID) 100 MCG tablet, Take 1 tablet by mouth Daily., Disp: 30 tablet, Rfl: 5  •  losartan (Cozaar) 25 MG tablet, Take 1 tablet by mouth Daily., Disp: 30 tablet, Rfl: 11  •  metoprolol tartrate (LOPRESSOR) 25 MG tablet, Take 1 tablet by mouth 2 (Two) Times a Day., Disp: 180 tablet, Rfl: 1  •  rosuvastatin (CRESTOR) 10 MG tablet, Take 1 tablet by mouth Daily., Disp: 30 tablet, Rfl: 11  •  silodosin (RAPAFLO) 8  "MG capsule capsule, 1 capsule Daily., Disp: , Rfl:   •  benzonatate (TESSALON) 100 MG capsule, Take 1 capsule by mouth 3 (Three) Times a Day As Needed for Cough., Disp: 21 capsule, Rfl: 0  •  promethazine-dextromethorphan (PROMETHAZINE-DM) 6.25-15 MG/5ML syrup, Take 5 mL by mouth At Night As Needed for Cough., Disp: 180 mL, Rfl: 0  No current facility-administered medications for this visit.     MEDICATION LIST AND ALLERGIES REVIEWED.    Social History     Tobacco Use   • Smoking status: Never Smoker   • Smokeless tobacco: Never Used   Substance Use Topics   • Alcohol use: Yes     Alcohol/week: 2.0 standard drinks     Types: 2 Glasses of wine per week     Comment: 3 X weekly    • Drug use: No       FAMILY AND SOCIAL HISTORY REVIEWED AND UPDATED IN EPIC    Review of Systems   Constitutional: Positive for activity change and fatigue. Negative for chills, fever and unexpected weight change.   HENT: Negative for congestion, nosebleeds, postnasal drip, rhinorrhea, sinus pressure and trouble swallowing.    Respiratory: Positive for cough and shortness of breath. Negative for chest tightness and wheezing.    Cardiovascular: Negative for chest pain and leg swelling.   Gastrointestinal: Negative for abdominal pain, constipation, diarrhea, nausea and vomiting.   Genitourinary: Negative for dysuria, frequency, hematuria and urgency.   Musculoskeletal: Positive for back pain. Negative for myalgias.   Neurological: Negative for dizziness, weakness, numbness and headaches.   All other systems reviewed and are negative.  .    /80   Pulse 70   Temp 97.3 °F (36.3 °C)   Ht 182.9 cm (72\")   Wt (!) 152 kg (336 lb)   SpO2 94% Comment: resting, room air  BMI 45.57 kg/m²        Immunization History   Administered Date(s) Administered   • Flu Mist 01/22/2014   • Flu Vaccine Quad PF >18YRS 10/26/2016   • Flu Vaccine Quad PF >36MO 11/08/2017   • Flulaval/Fluarix/Fluzone Quad 10/14/2020   • Hepatitis A 01/14/2019, 08/05/2019   • " Shingrix 08/30/2018, 04/01/2019   • Tetanus 07/01/2014   • Zostavax 01/22/2013   • flucelvax quad pfs =>4 YRS 01/14/2019, 11/06/2019       Physical Exam  Vitals signs reviewed.   Constitutional:       General: He is not in acute distress.     Appearance: He is well-developed. He is obese. He is not ill-appearing.   HENT:      Head: Normocephalic and atraumatic.   Eyes:      Pupils: Pupils are equal, round, and reactive to light.   Neck:      Musculoskeletal: Normal range of motion and neck supple.   Cardiovascular:      Rate and Rhythm: Normal rate and regular rhythm.      Heart sounds: No murmur.   Pulmonary:      Effort: Pulmonary effort is normal. No respiratory distress.      Breath sounds: Normal breath sounds. No wheezing or rales.   Abdominal:      General: Bowel sounds are normal. There is no distension.      Palpations: Abdomen is soft.   Musculoskeletal: Normal range of motion.      Right lower leg: No edema.      Left lower leg: No edema.   Skin:     General: Skin is warm and dry.      Findings: No erythema.   Neurological:      Mental Status: He is alert and oriented to person, place, and time.   Psychiatric:         Behavior: Behavior normal.         RESULTS    Lab Results   Component Value Date    WBC 6.03 10/14/2020    HGB 14.3 10/14/2020    HCT 42.1 10/14/2020    MCV 94.2 10/14/2020     10/14/2020     Lab Results   Component Value Date    GLUCOSE 108 (H) 10/14/2020    CALCIUM 9.3 10/14/2020     10/14/2020    K 4.5 10/14/2020    CO2 26.6 10/14/2020     (H) 10/14/2020    BUN 21 10/14/2020    CREATININE 0.99 10/14/2020    EGFRIFNONA 77 10/14/2020    BCR 21.2 10/14/2020    ANIONGAP 8.4 10/14/2020       PFTs done in the office today, and read by me:  Show significant decrease from his PFTs done in October 2019.  At that time he had some mild obstruction with an FEV1 of 3.08, 73% predicted no evidence of restriction with an FVC of 4.54, 86% predicted    Today he has significant  obstruction and restriction with an FVC of 3.02, 56% predicted and an FEV1 of 1.98, 48% predicted.  However he did have some difficulty with testing today with a cough and his flow volume loops are not as consistent as in October.        6-minute walk test did not show evidence of desaturation.  He had moderate exercise tolerance with a prediction of 85%, 4 and 57 m.          EXAMINATION: XR CHEST, 2 VIEWS - 01/01/2021     INDICATION: R06.02-Shortness of breath. Cough, shortness of breath.      COMPARISON: Chest x-ray 10/14/2020.     FINDINGS: PA and lateral views of the chest reveal cardiac size  unchanged. No focal consolidation or effusion. No pneumothorax or  pleural effusion.        IMPRESSION:  No acute cardiopulmonary process.     DICTATED:   01/01/2021  EDITED/ls :   01/01/2021      This report was finalized on 1/1/2021 6:43 PM by Dr. Gio Alexander.         PROBLEM LIST    Problem List Items Addressed This Visit        Other    TACO (obstructive sleep apnea)    Overview     Obstructive sleep apnea  NOT wearing CPAP.         Shortness of breath - Primary    Relevant Medications    albuterol sulfate HFA (PROVENTIL HFA;VENTOLIN HFA;PROAIR HFA) inhaler 4 puff (Completed)    Other Relevant Orders    Pulmonary Function Test (Completed)    Walking Oximetry (Completed)    CT Chest Hi Resolution Diagnostic    Morbid obesity with BMI of 45.0-49.9, adult (CMS/[facility])    Physical deconditioning            DISCUSSION    We did have a discussion about his worsening shortness of breath today in the office.  His initial PFTs showed some mild obstruction but he had no significant changes with the Breo or albuterol.  Today's PFTs are significantly decreased however he did have quite a bit of trouble with testing and coughed frequently.  It may be poor testing quality.    We also noted that he has gained over 60 pounds since October.  His weight on his PFTs were twos to 74 today's weight is 336.  We did discuss that the change in  his PFTs as well as his worsening shortness of breath may be solely related to his deconditioning and weight gain.    However with the restriction on his PFTs as well as a cough I would like to follow-up with a high-resolution CT scan, to rule out any bronchiectasis or underlying interstitial lung disease.    At this time I did recommend he continue on the Breo daily as well as the albuterol prior to any activity.  Again he was instructed today on how to properly use the albuterol.  He will report back on his next visit if that seem to make any difference of his shortness of breath.    We did discuss the possibility of a CPX to evaluate an underlying asthma as a cause of this worsening shortness of breath.    I did highly recommend he follow-up with sleep clinic for treatment of his sleep apnea.  We did discuss untreated sleep apnea can lead to daytime fatigue, hypersomnolence, and shortness of breath.    Follow-up here in the office in 2 to 3 weeks after his high-resolution CT scan and for a recheck.      Sahra Gunderson, APRN  02/01/202108:53 EST  Electronically signed     Please note that portions of this note were completed with a voice recognition program. Efforts were made to edit the dictations, but occasionally words are mistranscribed.      CC: Gail Chandler MD

## 2021-02-03 DIAGNOSIS — R05.3 PERSISTENT COUGH FOR 3 WEEKS OR LONGER: ICD-10-CM

## 2021-02-03 RX ORDER — DEXTROMETHORPHAN HYDROBROMIDE AND PROMETHAZINE HYDROCHLORIDE 15; 6.25 MG/5ML; MG/5ML
5 SYRUP ORAL NIGHTLY PRN
Qty: 180 ML | Refills: 0 | Status: SHIPPED | OUTPATIENT
Start: 2021-02-03 | End: 2021-03-22

## 2021-02-03 RX ORDER — AMOXICILLIN AND CLAVULANATE POTASSIUM 875; 125 MG/1; MG/1
1 TABLET, FILM COATED ORAL 2 TIMES DAILY
Qty: 20 TABLET | Refills: 0 | Status: SHIPPED | OUTPATIENT
Start: 2021-02-03 | End: 2021-03-22

## 2021-02-03 RX ORDER — PREDNISONE 10 MG/1
TABLET ORAL
Qty: 31 TABLET | Refills: 0 | Status: SHIPPED | OUTPATIENT
Start: 2021-02-03 | End: 2021-03-22

## 2021-03-22 ENCOUNTER — OFFICE VISIT (OUTPATIENT)
Dept: INTERNAL MEDICINE | Facility: CLINIC | Age: 61
End: 2021-03-22

## 2021-03-22 VITALS
SYSTOLIC BLOOD PRESSURE: 126 MMHG | OXYGEN SATURATION: 98 % | TEMPERATURE: 97.8 F | HEART RATE: 79 BPM | HEIGHT: 72 IN | WEIGHT: 315 LBS | BODY MASS INDEX: 42.66 KG/M2 | DIASTOLIC BLOOD PRESSURE: 78 MMHG

## 2021-03-22 DIAGNOSIS — G47.33 OSA (OBSTRUCTIVE SLEEP APNEA): Chronic | ICD-10-CM

## 2021-03-22 DIAGNOSIS — M79.672 BILATERAL FOOT PAIN: Chronic | ICD-10-CM

## 2021-03-22 DIAGNOSIS — M79.671 BILATERAL FOOT PAIN: Chronic | ICD-10-CM

## 2021-03-22 DIAGNOSIS — E03.9 ACQUIRED HYPOTHYROIDISM: Chronic | ICD-10-CM

## 2021-03-22 DIAGNOSIS — E66.01 MORBID OBESITY WITH BMI OF 45.0-49.9, ADULT (HCC): Chronic | ICD-10-CM

## 2021-03-22 DIAGNOSIS — R60.0 BILATERAL LOWER EXTREMITY EDEMA: Primary | Chronic | ICD-10-CM

## 2021-03-22 DIAGNOSIS — I87.8 VENOUS STASIS: Chronic | ICD-10-CM

## 2021-03-22 PROCEDURE — 99214 OFFICE O/P EST MOD 30 MIN: CPT | Performed by: INTERNAL MEDICINE

## 2021-03-22 RX ORDER — OMEPRAZOLE 40 MG/1
40 CAPSULE, DELAYED RELEASE ORAL DAILY
COMMUNITY
Start: 2021-02-15 | End: 2021-05-27
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
## 2021-04-08 ENCOUNTER — LAB (OUTPATIENT)
Dept: LAB | Facility: HOSPITAL | Age: 61
End: 2021-04-08

## 2021-04-08 DIAGNOSIS — E66.01 MORBID OBESITY WITH BMI OF 45.0-49.9, ADULT (HCC): Chronic | ICD-10-CM

## 2021-04-08 DIAGNOSIS — R60.0 BILATERAL LOWER EXTREMITY EDEMA: Chronic | ICD-10-CM

## 2021-04-08 DIAGNOSIS — G47.33 OSA (OBSTRUCTIVE SLEEP APNEA): Chronic | ICD-10-CM

## 2021-04-08 DIAGNOSIS — M79.672 BILATERAL FOOT PAIN: Chronic | ICD-10-CM

## 2021-04-08 DIAGNOSIS — M79.671 BILATERAL FOOT PAIN: Chronic | ICD-10-CM

## 2021-04-08 DIAGNOSIS — E03.9 ACQUIRED HYPOTHYROIDISM: Chronic | ICD-10-CM

## 2021-04-08 LAB
ALBUMIN SERPL-MCNC: 4.2 G/DL (ref 3.5–5.2)
ALBUMIN/GLOB SERPL: 1.9 G/DL
ALP SERPL-CCNC: 53 U/L (ref 39–117)
ALT SERPL W P-5'-P-CCNC: 28 U/L (ref 1–41)
ANION GAP SERPL CALCULATED.3IONS-SCNC: 8 MMOL/L (ref 5–15)
AST SERPL-CCNC: 23 U/L (ref 1–40)
BILIRUB SERPL-MCNC: 0.4 MG/DL (ref 0–1.2)
BUN SERPL-MCNC: 14 MG/DL (ref 8–23)
BUN/CREAT SERPL: 14.4 (ref 7–25)
CALCIUM SPEC-SCNC: 9.2 MG/DL (ref 8.6–10.5)
CHLORIDE SERPL-SCNC: 106 MMOL/L (ref 98–107)
CO2 SERPL-SCNC: 30 MMOL/L (ref 22–29)
CREAT SERPL-MCNC: 0.97 MG/DL (ref 0.76–1.27)
D DIMER PPP FEU-MCNC: <0.27 MCGFEU/ML (ref 0–0.56)
DEPRECATED RDW RBC AUTO: 43 FL (ref 37–54)
ERYTHROCYTE [DISTWIDTH] IN BLOOD BY AUTOMATED COUNT: 13 % (ref 12.3–15.4)
GFR SERPL CREATININE-BSD FRML MDRD: 79 ML/MIN/1.73
GLOBULIN UR ELPH-MCNC: 2.2 GM/DL
GLUCOSE SERPL-MCNC: 100 MG/DL (ref 65–99)
HCT VFR BLD AUTO: 42 % (ref 37.5–51)
HGB BLD-MCNC: 15 G/DL (ref 13–17.7)
MCH RBC QN AUTO: 32.8 PG (ref 26.6–33)
MCHC RBC AUTO-ENTMCNC: 35.7 G/DL (ref 31.5–35.7)
MCV RBC AUTO: 91.7 FL (ref 79–97)
PLATELET # BLD AUTO: 272 10*3/MM3 (ref 140–450)
PMV BLD AUTO: 10.9 FL (ref 6–12)
POTASSIUM SERPL-SCNC: 4.3 MMOL/L (ref 3.5–5.2)
PROT SERPL-MCNC: 6.4 G/DL (ref 6–8.5)
RBC # BLD AUTO: 4.58 10*6/MM3 (ref 4.14–5.8)
SODIUM SERPL-SCNC: 144 MMOL/L (ref 136–145)
TSH SERPL DL<=0.05 MIU/L-ACNC: 2.55 UIU/ML (ref 0.27–4.2)
URATE SERPL-MCNC: 5.8 MG/DL (ref 3.4–7)
VIT B12 BLD-MCNC: 378 PG/ML (ref 211–946)
WBC # BLD AUTO: 5.6 10*3/MM3 (ref 3.4–10.8)

## 2021-04-08 PROCEDURE — 85027 COMPLETE CBC AUTOMATED: CPT | Performed by: INTERNAL MEDICINE

## 2021-04-08 PROCEDURE — 36415 COLL VENOUS BLD VENIPUNCTURE: CPT

## 2021-04-08 PROCEDURE — 85379 FIBRIN DEGRADATION QUANT: CPT | Performed by: INTERNAL MEDICINE

## 2021-04-08 PROCEDURE — 84550 ASSAY OF BLOOD/URIC ACID: CPT | Performed by: INTERNAL MEDICINE

## 2021-04-08 PROCEDURE — 84443 ASSAY THYROID STIM HORMONE: CPT | Performed by: INTERNAL MEDICINE

## 2021-04-08 PROCEDURE — 80053 COMPREHEN METABOLIC PANEL: CPT | Performed by: INTERNAL MEDICINE

## 2021-04-08 PROCEDURE — 82607 VITAMIN B-12: CPT | Performed by: INTERNAL MEDICINE

## 2021-04-12 ENCOUNTER — OFFICE VISIT (OUTPATIENT)
Dept: INTERNAL MEDICINE | Facility: CLINIC | Age: 61
End: 2021-04-12

## 2021-04-12 VITALS
HEART RATE: 73 BPM | HEIGHT: 72 IN | TEMPERATURE: 98 F | BODY MASS INDEX: 42.66 KG/M2 | WEIGHT: 315 LBS | SYSTOLIC BLOOD PRESSURE: 124 MMHG | DIASTOLIC BLOOD PRESSURE: 76 MMHG | OXYGEN SATURATION: 97 %

## 2021-04-12 DIAGNOSIS — I83.892 VARICOSE VEINS OF LEG WITH EDEMA, LEFT: ICD-10-CM

## 2021-04-12 DIAGNOSIS — R60.0 BILATERAL LOWER EXTREMITY EDEMA: Primary | Chronic | ICD-10-CM

## 2021-04-12 PROBLEM — I87.8 VENOUS STASIS: Chronic | Status: ACTIVE | Noted: 2018-02-14

## 2021-04-12 PROCEDURE — 99213 OFFICE O/P EST LOW 20 MIN: CPT | Performed by: INTERNAL MEDICINE

## 2021-04-12 NOTE — PROGRESS NOTES
Chief Complaint  Leg Swelling (bilateral lower leg swelling, has gotten some better but still swollen and painful)    Subjective          Katharine Lantigua presents to [facility] PRIMARY [facility]ARE  History of Present Illness    Here for follow-up on Bilateral lower extremity swelling and pain.  We saw him 2 weeks ago and had him increase Lasix to 2 daily for a week.  Also emphasized leg elevation and trying to get more exercise  Labs done last week - D-dimer  was negative , potassium and creatinine normal and uric acid was also normal  He did see Dr Chaudhry  In '18 regarding the varicose veins in left leg 3 years ago, but pt could not tolerate compression stockings so never had any procedures done  He also had a duplex in 8/19 which was negative for reflux and dvt  His last echo was in 2018 and EF was 60% and he had mild LVH.  He follows regularly with cardiology as well.  He has baseline shortness of breath but this has not worsened.    Still very difficult to get any exercise in because of his back pain, though pool will open end of May and he hopes to swim then to help lose weight    LBP - Dr Vaughn has recommended he have fusion      Current Outpatient Medications:   •  acetaminophen (TYLENOL) 500 MG tablet, Take 1,000 mg by mouth 2 (Two) Times a Day., Disp: , Rfl:   •  albuterol sulfate  (90 Base) MCG/ACT inhaler, Inhale 2 puffs Every 4 (Four) Hours As Needed for Shortness of Air., Disp: 18 g, Rfl: 11  •  apixaban (Eliquis) 5 MG tablet tablet, Take 1 tablet by mouth Every 12 (Twelve) Hours., Disp: 180 tablet, Rfl: 3  •  Fluticasone Furoate-Vilanterol (Breo Ellipta) 100-25 MCG/INH inhaler, Inhale 1 puff Daily., Disp: 1 inhaler, Rfl: 5  •  furosemide (LASIX) 40 MG tablet, TAKE 1 TABLET BY MOUTH ONCE DAILY IF NEEDED FOR SWELLING, Disp: 90 tablet, Rfl: 0  •  levothyroxine (SYNTHROID, LEVOTHROID) 100 MCG tablet, Take 1 tablet by mouth Daily., Disp: 30 tablet, Rfl: 5  •  losartan (Cozaar) 25 MG tablet,  "Take 1 tablet by mouth Daily., Disp: 30 tablet, Rfl: 11  •  metoprolol tartrate (LOPRESSOR) 25 MG tablet, Take 1 tablet by mouth 2 (Two) Times a Day., Disp: 180 tablet, Rfl: 1  •  omeprazole (priLOSEC) 40 MG capsule, Take 40 mg by mouth Daily., Disp: , Rfl:   •  rosuvastatin (CRESTOR) 10 MG tablet, Take 1 tablet by mouth Daily., Disp: 30 tablet, Rfl: 11  •  silodosin (RAPAFLO) 8 MG capsule capsule, 1 capsule Daily., Disp: , Rfl:       Objective   Vital Signs:   /76 (BP Location: Left arm, Patient Position: Sitting)   Pulse 73   Temp 98 °F (36.7 °C) (Infrared)   Ht 182.9 cm (72\")   Wt (!) 157 kg (346 lb)   SpO2 97%   BMI 46.93 kg/m²       Physical exam  Constitutional: oriented to person, place, and time.  well-developed and well-nourished. No distress.   HENT:   Head: Normocephalic and atraumatic.   Eyes: Conjunctivae and EOM are normal.   Cardiovascular: Normal rate, regular rhythm and normal heart sounds.  Exam reveals no gallop and no friction rub.    No murmur heard.  Pulmonary/Chest: Effort normal and breath sounds normal. No respiratory distress.   no wheezes.   Neurological:  alert and oriented to person, place, and time.   Skin: Skin is warm and dry. not diaphoretic.   PV: left leg w/ trace edema - improved from last visit. Venous stasis changes. 2+ pedal pulses. + left LE varicose veins  Psychiatric:  normal mood and affect. behavior is normal. Judgment and thought content normal.      Result Review :   The following data was reviewed by: Gail Chandler MD on 04/12/2021:  CMP    CMP 6/12/20 6/12/20 10/14/20 4/8/21    0936 0936     Glucose  106 (A) 108 (A) 100 (A)   BUN  21 21 14   Creatinine  1.00 0.99 0.97   eGFR Non African Am  76 77 79   Sodium  143 144 144   Potassium  4.7 4.5 4.3   Chloride  106 109 (A) 106   Calcium  9.1 9.3 9.2   Albumin 4.30   4.20   Total Bilirubin 0.2   0.4   Alkaline Phosphatase 51   53   AST (SGOT) 15   23   ALT (SGPT) 21   28   (A) Abnormal value            CBC  "   CBC 6/12/20 10/14/20 4/8/21   WBC 6.13 6.03 5.60   RBC 4.61 4.47 4.58   Hemoglobin 14.8 14.3 15.0   Hematocrit 42.4 42.1 42.0   MCV 92.0 94.2 91.7   MCH 32.1 32.0 32.8   MCHC 34.9 34.0 35.7   RDW 13.3 13.3 13.0   Platelets 263 257 272           Uric Acid    Common Labsle 4/8/21   Uric Acid 5.8                       Assessment and Plan    Diagnoses and all orders for this visit:    1. Bilateral lower extremity edema (Primary)  Comments:  we will go ahead and have him see vascular regarding varicose veins. encouraged elevation, exercise, weight loss  Orders:  -     Ambulatory Referral to Vascular Surgery    2. Varicose veins of leg with edema, left  -     Ambulatory Referral to Vascular Surgery        Follow Up   Return in about 6 months (around 10/12/2021) for Annual.  Patient was given instructions and counseling regarding his condition or for health maintenance advice. Please see specific information pulled into the AVS if appropriate.

## 2021-05-19 ENCOUNTER — OFFICE VISIT (OUTPATIENT)
Dept: CARDIAC SURGERY | Facility: CLINIC | Age: 61
End: 2021-05-19

## 2021-05-19 VITALS
BODY MASS INDEX: 40.43 KG/M2 | WEIGHT: 315 LBS | OXYGEN SATURATION: 96 % | SYSTOLIC BLOOD PRESSURE: 150 MMHG | TEMPERATURE: 97.1 F | HEIGHT: 74 IN | HEART RATE: 75 BPM | DIASTOLIC BLOOD PRESSURE: 100 MMHG

## 2021-05-19 DIAGNOSIS — I87.8 VENOUS STASIS SYNDROME: Primary | ICD-10-CM

## 2021-05-19 PROCEDURE — 99203 OFFICE O/P NEW LOW 30 MIN: CPT | Performed by: THORACIC SURGERY (CARDIOTHORACIC VASCULAR SURGERY)

## 2021-05-19 RX ORDER — SPIRONOLACTONE 50 MG/1
50 TABLET, FILM COATED ORAL DAILY
COMMUNITY
Start: 2021-04-22 | End: 2023-03-07

## 2021-05-19 RX ORDER — LOSARTAN POTASSIUM 50 MG/1
50 TABLET ORAL DAILY
COMMUNITY
Start: 2021-04-22 | End: 2023-03-07

## 2021-05-19 NOTE — PROGRESS NOTES
05/19/2021  Patient Information  Katharine MYERS [address] [address]   1960  'PCP/Referring Physician'  Gail Chandler MD  [phone]  Gail Chandler MD  [phone]  Chief Complaint   Patient presents with   • Consult     NP per Dr. Ernestine Chandler for Left Lower Extremity Edema   D    History of Present Illness: Is a 61-year-old male who was referred to at this time by Dr. Ernestine Chandler for further evaluation of swelling of the lower extremities over the past 3 to 4 months.  The patient states that he has been seen in the past by Dr. Valdez Clements of vascular surgery for this problem.  Dr. Clements stated in a note on April 17, 2018 that the patient has venous stasis disease especially symptomatic varicose veins of left lower extremity and he wanted the patient to wear compression stockings for at least 6 months before he could proceed ahead with any possible venous ligation.  The patient states he could not wear the stockings due to the pain of wearing the stockings and  the difficulties in applying them to his legs.  Patient states that he has gained at least 25 pounds over the past several months and now weighs approximately 340 pounds which is his all-time high according to a office note of Dr. Chandler's dated April 21, 2021.    Patient Active Problem List   Diagnosis   • Paroxysmal atrial fibrillation (CMS/HCC)   • Obesity   • TACO (obstructive sleep apnea)   • Acquired hypothyroidism   • Gout   • Insomnia   • Essential hypertension   • Pain in joint involving ankle and foot   • Ankle mass, right   • Venous stasis   • Plantar wart of right foot   • Nerve sheath tumor   • Shortness of breath   • Bilateral lower extremity edema   • Pure hypercholesterolemia   • Mild airflow obstruction on pulmonary function test   • Morbid obesity with BMI of 45.0-49.9, adult (CMS/HCC)   • Physical deconditioning   • Fatty  liver     Past Medical History:   Diagnosis Date   • Abnormal adenosine sestamibi study    • Anxiety    • BCC (basal cell carcinoma of skin) 2018    nose   • Depression     Dr. Peterson   • Echocardiogram abnormal 04/26/2021    Dr Melgar - EF 60%, mild LVH, mild MR, TR, moderately dialted LA   • Fatty liver 2016    by CT and US   • H/O cardiovascular stress test 04/2010    negative   • H/O colonoscopy 12/2014    Dr. Olivier- neg. repeat in 3-5 yrs, previous h/o adenomatous polyp '12 & '13   • Heart disease    • History of diagnostic ultrasound 04/2016    fatty liver, multiple hypodense lesions in liver, GB nml, CT liver recommended   • Hyperlipidemia    • Hypertension    • Hypothyroidism    • Multiple hemangiomas 05/01/2016    4 hemangiomas in liver - liver protocol CT at . 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 - Dr. Amin   • Osteoarthritis    • Paroxysmal atrial fibrillation (CMS/HCC) 09/2015   • Screening PSA (prostate specific antigen) 04/2016    0.4; 2/15 0.4; 1/14 0.4   • Skin cancer of face      Past Surgical History:   Procedure Laterality Date   • ANAL FISTULA REPAIR      1980 & 1990   • CARDIAC ABLATION     • COLONOSCOPY     • JOINT REPLACEMENT Right    • MOHS SURGERY Left 2018    left ala - BCC   • SEPTOPLASTY     • ARCENIO  08/18/2015    ARCENIO/external cardioversion into normal sinus rhythm with initiation of flecainide, 08/18/2015.  EF 55% to 60%.   • TONSILLECTOMY     • TOTAL HIP ARTHROPLASTY Right 1999    Right total hip replacement. (h/o childhood fracture)   • TOTAL HIP ARTHROPLASTY Left 11/06/2019    Dr Allen   • VENOUS ABLATION  110/02/2015    Status post cryoablation of the pulmonary veins, 11/02/2015, with maintenance of sinus rhythm on no antiarrhythmic.       Current Outpatient Medications:   •  apixaban (Eliquis) 5 MG tablet tablet, Take 1 tablet by mouth Every 12 (Twelve) Hours.,  Disp: 180 tablet, Rfl: 3  •  furosemide (LASIX) 40 MG tablet, TAKE 1 TABLET BY MOUTH ONCE DAILY IF NEEDED FOR SWELLING, Disp: 90 tablet, Rfl: 0  •  levothyroxine (SYNTHROID, LEVOTHROID) 100 MCG tablet, Take 1 tablet by mouth Daily., Disp: 30 tablet, Rfl: 5  •  losartan (Cozaar) 25 MG tablet, Take 1 tablet by mouth Daily., Disp: 30 tablet, Rfl: 11  •  losartan (COZAAR) 50 MG tablet, Take 50 mg by mouth Daily., Disp: , Rfl:   •  metoprolol tartrate (LOPRESSOR) 25 MG tablet, Take 1 tablet by mouth 2 (Two) Times a Day., Disp: 180 tablet, Rfl: 1  •  omeprazole (priLOSEC) 40 MG capsule, Take 40 mg by mouth Daily., Disp: , Rfl:   •  rosuvastatin (CRESTOR) 10 MG tablet, Take 1 tablet by mouth Daily., Disp: 30 tablet, Rfl: 11  •  silodosin (RAPAFLO) 8 MG capsule capsule, 1 capsule Daily., Disp: , Rfl:   •  spironolactone (ALDACTONE) 50 MG tablet, Take 50 mg by mouth Daily., Disp: , Rfl:   •  acetaminophen (TYLENOL) 500 MG tablet, Take 1,000 mg by mouth 2 (Two) Times a Day., Disp: , Rfl:   •  albuterol sulfate  (90 Base) MCG/ACT inhaler, Inhale 2 puffs Every 4 (Four) Hours As Needed for Shortness of Air., Disp: 18 g, Rfl: 11  •  Fluticasone Furoate-Vilanterol (Breo Ellipta) 100-25 MCG/INH inhaler, Inhale 1 puff Daily., Disp: 1 inhaler, Rfl: 5  No Known Allergies  Social History     Socioeconomic History   • Marital status:      Spouse name: Not on file   • Number of children: 2   • Years of education: Not on file   • Highest education level: Not on file   Tobacco Use   • Smoking status: Never Smoker   • Smokeless tobacco: Never Used   Substance and Sexual Activity   • Alcohol use: Yes     Alcohol/week: 2.0 standard drinks     Types: 2 Glasses of wine per week     Comment: 3 X weekly    • Drug use: No   • Sexual activity: Defer     Family History   Problem Relation Age of Onset   • Hypothyroidism Mother          in 80's   • Cancer Mother    • Stroke Mother    • Cancer Father    • Diabetes type II Father   "  • Hypertension Father    • Coronary artery disease Father         60;s   • Deep vein thrombosis Father    • Obesity Other    • Coronary artery disease Other      Review of Systems   Constitutional: Positive for weight gain (70 lbs in 1 1/2 years). Negative for chills, fever, malaise/fatigue, night sweats and weight loss.   HENT: Negative for hearing loss, odynophagia and sore throat.    Cardiovascular: Positive for dyspnea on exertion and leg swelling. Negative for chest pain, orthopnea and palpitations.   Respiratory: Positive for shortness of breath. Negative for cough and hemoptysis.    Endocrine: Negative for cold intolerance, heat intolerance, polydipsia, polyphagia and polyuria.   Hematologic/Lymphatic: Bruises/bleeds easily.   Skin: Negative for itching and rash.   Musculoskeletal: Positive for back pain. Negative for joint pain, joint swelling and myalgias.   Gastrointestinal: Negative for abdominal pain, constipation, diarrhea, hematemesis, hematochezia, melena, nausea and vomiting.   Genitourinary: Negative for dysuria, frequency and hematuria.   Neurological: Positive for dizziness. Negative for focal weakness, headaches, numbness and seizures.   Psychiatric/Behavioral: Positive for depression. Negative for suicidal ideas. The patient is nervous/anxious.    All other systems reviewed and are negative.    Vitals:    05/19/21 1331   BP: 150/100   BP Location: Left arm   Patient Position: Sitting   Pulse: 75   Temp: 97.1 °F (36.2 °C)   SpO2: 96%   Weight: (!) 156 kg (343 lb)   Height: 188 cm (74\")      Physical Exam  Constitutional:       General: He is not in acute distress.     Appearance: He is obese. He is not ill-appearing, toxic-appearing or diaphoretic.      Comments: Patient is morbidly obese at over 300 pounds   HENT:      Head: Normocephalic and atraumatic.   Eyes:      Extraocular Movements: Extraocular movements intact.      Pupils: Pupils are equal, round, and reactive to light.   Neck:      " Vascular: No carotid bruit.   Cardiovascular:      Rate and Rhythm: Normal rate and regular rhythm.      Pulses: Normal pulses.      Comments: Patient has palpable pedal pulses bilaterally  Pulmonary:      Breath sounds: Normal breath sounds. No stridor. No wheezing, rhonchi or rales.   Abdominal:      General: There is no distension.      Palpations: There is no mass.      Tenderness: There is no abdominal tenderness. There is no right CVA tenderness, left CVA tenderness, guarding or rebound.      Comments: Patient's abdomen: Obese   Musculoskeletal:         General: Normal range of motion.      Cervical back: Normal range of motion and neck supple. No rigidity or tenderness.   Lymphadenopathy:      Cervical: No cervical adenopathy.   Skin:     General: Skin is warm and dry.      Capillary Refill: Capillary refill takes less than 2 seconds.      Comments: Patient has some superficial vascular varicosities of the left lower leg medially just below the knee   Neurological:      Mental Status: He is alert and oriented to person, place, and time.   Psychiatric:         Mood and Affect: Mood normal.         Behavior: Behavior normal.         Thought Content: Thought content normal.         The ROS, past medical history, surgical history, family history, social history and vitals were reviewed by myself and corrected as needed.      Labs/Imaging: I read Dr. Clements's note of April 17, 2018.  It stated that the patient should wear compression hose for at least 6 months before he should be considered for any type of venous surgery such as venous ligation.  We have no other imaging reports to review.    Assessment/Plan: #1.  Chronic venous stasis syndrome with varicose veins for at least 3 years.  Marked increase in weight over the past several months most likely contributing to his symptoms now of venous stasis syndrome.  2.  Ablation for atrial fibrillation now on Eliquis  3.  Bilateral hip replacement    Plan: At this  time I think there is no need to consider any surgical intervention for this relatively  mild venous stasis syndrome.  He needs to lose weight and he is understanding of this and stated that he will to try to lose 100 pounds over the next several months.  This most likely will help alleviate his symptoms of this venous stasis syndrome of swollen/tired legs.  I given him a prescription for compression hose of 15 mmHg compression both right and left leg to fill at [facility].    We will plan to see the patient on a as needed basis.        Patient Active Problem List   Diagnosis   • Paroxysmal atrial fibrillation (CMS/HCC)   • Obesity   • TACO (obstructive sleep apnea)   • Acquired hypothyroidism   • Gout   • Insomnia   • Essential hypertension   • Pain in joint involving ankle and foot   • Ankle mass, right   • Venous stasis   • Plantar wart of right foot   • Nerve sheath tumor   • Shortness of breath   • Bilateral lower extremity edema   • Pure hypercholesterolemia   • Mild airflow obstruction on pulmonary function test   • Morbid obesity with BMI of 45.0-49.9, adult (CMS/HCC)   • Physical deconditioning   • Fatty liver

## 2021-05-27 RX ORDER — OMEPRAZOLE 40 MG/1
CAPSULE, DELAYED RELEASE ORAL
Qty: 30 CAPSULE | Refills: 5 | Status: SHIPPED | OUTPATIENT
Start: 2021-05-27 | End: 2021-11-16

## 2021-05-27 RX ORDER — LEVOTHYROXINE SODIUM 0.1 MG/1
100 TABLET ORAL DAILY
Qty: 30 TABLET | Refills: 5 | Status: SHIPPED | OUTPATIENT
Start: 2021-05-27 | End: 2021-11-16

## 2021-06-04 ENCOUNTER — OFFICE VISIT (OUTPATIENT)
Dept: INTERNAL MEDICINE | Facility: CLINIC | Age: 61
End: 2021-06-04

## 2021-06-04 VITALS
HEART RATE: 62 BPM | HEIGHT: 72 IN | TEMPERATURE: 97.8 F | BODY MASS INDEX: 42.66 KG/M2 | DIASTOLIC BLOOD PRESSURE: 72 MMHG | SYSTOLIC BLOOD PRESSURE: 138 MMHG | WEIGHT: 315 LBS | OXYGEN SATURATION: 96 %

## 2021-06-04 DIAGNOSIS — R60.0 BILATERAL LOWER EXTREMITY EDEMA: Chronic | ICD-10-CM

## 2021-06-04 DIAGNOSIS — J06.9 UPPER RESPIRATORY TRACT INFECTION, UNSPECIFIED TYPE: Primary | ICD-10-CM

## 2021-06-04 PROCEDURE — 99214 OFFICE O/P EST MOD 30 MIN: CPT | Performed by: INTERNAL MEDICINE

## 2021-06-04 RX ORDER — NAPROXEN SODIUM 220 MG
440 TABLET ORAL 2 TIMES DAILY PRN
COMMUNITY
End: 2021-06-28

## 2021-06-04 RX ORDER — AZITHROMYCIN 250 MG/1
TABLET, FILM COATED ORAL
Qty: 6 TABLET | Refills: 0 | Status: SHIPPED | OUTPATIENT
Start: 2021-06-04 | End: 2021-06-28

## 2021-06-04 NOTE — PROGRESS NOTES
Chief Complaint  Cough (cough since Sunday, productive), Sore Throat, and Headache (has had some headaches)    Subjective          Katharine Lantigua presents to [facility]  History of Present Illness    URI symptoms-he has had ST, hoarseness, and cough last 5 days. Cough is mostly nonproductive, but will sometimes bring up some grey mucus. He cannot tell if its drainage are coming from his chest. Has also had a generalized headache but not really sinus pressure. No ear pain. Some diarrhea the last few days, 2-3 bowel movements a day. He does have allergies but has not bothered him the spring.  No fever, no loss of smell or taste  Using Dayquil - not much help    Did have covid vaccines that he finished 3 months ago    Was around his daughter who was sick w/ similar symptoms -she was seen at [facility] and given antibiotics and is feeling better.    Lower extremity edema-has improved some. He is now on spironolactone  from Dr Melgar, and losartan was raised 2 months ago. Dr. Melgar repeated heart echo which showed mild LVH but diastolic function was not able to be assessed. Dr. Melgar also repeated liver ultrasound which showed the known hemangiomas  He also saw Dr. Ling last month for the varicose veins and Dr. Barry recommended compression stockings again and gave him a prescription for 15 mmHg; patient has not picked them up yet but will soon. Dr. Ling did not feel any surgical intervention was necessary at this time    Morbid obesity- He is swimming now that the pool is open, and is doing HMR some has not been as faithful with it since it is done remotely rather than in person. His weight is down 10 pounds since he was here about 6 weeks ago        Current Outpatient Medications:   •  albuterol sulfate  (90 Base) MCG/ACT inhaler, Inhale 2 puffs Every 4 (Four) Hours As Needed for Shortness of Air., Disp: 18 g, Rfl: 11  •  apixaban (Eliquis) 5 MG tablet tablet, Take 1 tablet by  "mouth Every 12 (Twelve) Hours., Disp: 180 tablet, Rfl: 3  •  Fluticasone Furoate-Vilanterol (Breo Ellipta) 100-25 MCG/INH inhaler, Inhale 1 puff Daily., Disp: 1 inhaler, Rfl: 5  •  furosemide (LASIX) 40 MG tablet, TAKE 1 TABLET BY MOUTH ONCE DAILY IF NEEDED FOR SWELLING, Disp: 90 tablet, Rfl: 0  •  levothyroxine (SYNTHROID, LEVOTHROID) 100 MCG tablet, TAKE 1 TABLET BY MOUTH DAILY, Disp: 30 tablet, Rfl: 5  •  losartan (COZAAR) 50 MG tablet, Take 50 mg by mouth Daily., Disp: , Rfl:   •  metoprolol tartrate (LOPRESSOR) 25 MG tablet, Take 1 tablet by mouth 2 (Two) Times a Day., Disp: 180 tablet, Rfl: 1  •  naproxen sodium (ALEVE) 220 MG tablet, Take 440 mg by mouth 2 (Two) Times a Day As Needed for Mild Pain ., Disp: , Rfl:   •  omeprazole (priLOSEC) 40 MG capsule, TAKE 1 CAPSULE BY MOUTH DAILY, Disp: 30 capsule, Rfl: 5  •  rosuvastatin (CRESTOR) 10 MG tablet, Take 1 tablet by mouth Daily., Disp: 30 tablet, Rfl: 11  •  silodosin (RAPAFLO) 8 MG capsule capsule, 1 capsule Daily., Disp: , Rfl:   •  spironolactone (ALDACTONE) 50 MG tablet, Take 50 mg by mouth Daily., Disp: , Rfl:   •  azithromycin (Zithromax Z-Rustam) 250 MG tablet, Take 2 tablets the first day, then 1 tablet daily for 4 days., Disp: 6 tablet, Rfl: 0      Objective   Vital Signs:   /72 (BP Location: Right arm, Patient Position: Sitting)   Pulse 62   Temp 97.8 °F (36.6 °C) (Infrared)   Ht 182.9 cm (72\")   Wt (!) 153 kg (336 lb 9.6 oz)   SpO2 96%   BMI 45.65 kg/m²       Physical exam  Constitutional: oriented to person, place, and time.  well-developed and well-nourished. No distress.   HENT: Bilateral TMs dull. OP-erythema but no exudate or drainage. Sinuses-slight right maxillary tenderness.  Head: Normocephalic and atraumatic.   Eyes: Conjunctivae and EOM are normal.   Cardiovascular: Normal rate, regular rhythm and normal heart sounds.  Exam reveals no gallop and no friction rub.    No murmur heard.  Pulmonary/Chest: Effort normal and breath " sounds normal. No respiratory distress.   no wheezes.   Neurological:  alert and oriented to person, place, and time.   Skin: Skin is warm and dry. not diaphoretic.   PV: Trace bilateral lower extremity edema. Left-sided varicose veins  Psychiatric:  normal mood and affect. behavior is normal. Judgment and thought content normal.      Result Review :   The following data was reviewed by: Gail Chandler MD on 06/04/2021:  CMP    CMP 6/12/20 6/12/20 10/14/20 4/8/21    0936 0936     Glucose  106 (A) 108 (A) 100 (A)   BUN  21 21 14   Creatinine  1.00 0.99 0.97   eGFR Non African Am  76 77 79   Sodium  143 144 144   Potassium  4.7 4.5 4.3   Chloride  106 109 (A) 106   Calcium  9.1 9.3 9.2   Albumin 4.30   4.20   Total Bilirubin 0.2   0.4   Alkaline Phosphatase 51   53   AST (SGOT) 15   23   ALT (SGPT) 21   28   (A) Abnormal value            CBC    CBC 6/12/20 10/14/20 4/8/21   WBC 6.13 6.03 5.60   RBC 4.61 4.47 4.58   Hemoglobin 14.8 14.3 15.0   Hematocrit 42.4 42.1 42.0   MCV 92.0 94.2 91.7   MCH 32.1 32.0 32.8   MCHC 34.9 34.0 35.7   RDW 13.3 13.3 13.0   Platelets 263 257 272           TSH    TSH 6/12/20 10/14/20 4/8/21   TSH 5.400 (A) 3.260 2.550   (A) Abnormal value              Data reviewed: Radiologic studies Liver ultrasound, Cardiology studies Echo and Consultant notes Notes from Dr. Ling and Dr. Melgar          Assessment and Plan    Diagnoses and all orders for this visit:    1. Upper respiratory tract infection, unspecified type (Primary)  Comments:  Fluids, rest. Continue DayQuil as needed. We will go ahead and start Z-Rustam. Do not feel we need to do Covid testing since he has been vaccinated  Orders:  -     azithromycin (Zithromax Z-Rustam) 250 MG tablet; Take 2 tablets the first day, then 1 tablet daily for 4 days.  Dispense: 6 tablet; Refill: 0    2. Bilateral lower extremity edema  Comments:  Likely venous insufficiency from morbid obesity. Patient will continue working on weight loss and will try to use  compression stockings        Follow Up   No follow-ups on file.  Patient was given instructions and counseling regarding his condition or for health maintenance advice. Please see specific information pulled into the AVS if appropriate.     Preventative-she has physical scheduled in October

## 2021-06-22 RX ORDER — ROSUVASTATIN CALCIUM 10 MG/1
10 TABLET, COATED ORAL DAILY
Qty: 30 TABLET | Refills: 11 | Status: SHIPPED | OUTPATIENT
Start: 2021-06-22 | End: 2022-06-16 | Stop reason: SDUPTHER

## 2021-06-28 ENCOUNTER — OFFICE VISIT (OUTPATIENT)
Dept: INTERNAL MEDICINE | Facility: CLINIC | Age: 61
End: 2021-06-28

## 2021-06-28 VITALS
BODY MASS INDEX: 42.66 KG/M2 | HEART RATE: 71 BPM | DIASTOLIC BLOOD PRESSURE: 82 MMHG | RESPIRATION RATE: 20 BRPM | OXYGEN SATURATION: 97 % | WEIGHT: 315 LBS | HEIGHT: 72 IN | TEMPERATURE: 97.5 F | SYSTOLIC BLOOD PRESSURE: 126 MMHG

## 2021-06-28 DIAGNOSIS — M25.561 ACUTE PAIN OF RIGHT KNEE: Primary | ICD-10-CM

## 2021-06-28 PROCEDURE — 99213 OFFICE O/P EST LOW 20 MIN: CPT | Performed by: PHYSICIAN ASSISTANT

## 2021-06-28 NOTE — ASSESSMENT & PLAN NOTE
New/worsening, refer to ortho for eval. Adv against oral nsaids as he is on eliquis, can take tylenol prn for pain, ice and elevate for swelling, suggested compression wrap for swelling and stability. Adv rest, can swim in the pool for exercise but avoid weight bearing exercise until eval by ortho.   rx topical voltaren

## 2021-06-28 NOTE — PROGRESS NOTES
Chief Complaint  Knee Pain (right knee ) and Joint Swelling (right knee )    Subjective          History of Present Illness  Katharine Lantigua presents to [facility] for   Knee Pain:  He was getting up out of a chair 10 days ago and hyperextended his right knee. He had pain and weakness suddenly and then developed swelling and and now worsening pain over the next few days. He has been icing his knee and resting it and elevating it. Sx worsened this weekend after he walked a lot on Friday and was not able to get up out of bed on Saturday due to knee pain. Feels like his knee is difficult to move. Did not hear or feel a pop when he injured it. Sx have been getting worse. He has not tried a knee brace. Is able to put some pressure on it but not as much as normal. Has pain with both flexion and extension. Has been taking naproxen but it does not help much.      Review of Systems   Constitutional: Negative for fever and unexpected weight loss.   Respiratory: Negative for cough, shortness of breath and wheezing.    Cardiovascular: Negative for chest pain and palpitations.   Musculoskeletal: Positive for arthralgias, joint swelling and myalgias.       The following portions of the patient's history were reviewed and updated as appropriate: allergies, current medications, past family history, past medical history, past social history, past surgical history and problem list.  No Known Allergies  Current Outpatient Medications on File Prior to Visit   Medication Sig Dispense Refill   • albuterol sulfate  (90 Base) MCG/ACT inhaler Inhale 2 puffs Every 4 (Four) Hours As Needed for Shortness of Air. 18 g 11   • apixaban (Eliquis) 5 MG tablet tablet Take 1 tablet by mouth Every 12 (Twelve) Hours. 180 tablet 3   • Fluticasone Furoate-Vilanterol (Breo Ellipta) 100-25 MCG/INH inhaler Inhale 1 puff Daily. 1 inhaler 5   • furosemide (LASIX) 40 MG tablet TAKE 1 TABLET BY MOUTH ONCE DAILY IF NEEDED FOR  "SWELLING 90 tablet 0   • levothyroxine (SYNTHROID, LEVOTHROID) 100 MCG tablet TAKE 1 TABLET BY MOUTH DAILY 30 tablet 5   • losartan (COZAAR) 50 MG tablet Take 50 mg by mouth Daily.     • metoprolol tartrate (LOPRESSOR) 25 MG tablet Take 1 tablet by mouth 2 (Two) Times a Day. 180 tablet 1   • omeprazole (priLOSEC) 40 MG capsule TAKE 1 CAPSULE BY MOUTH DAILY 30 capsule 5   • rosuvastatin (CRESTOR) 10 MG tablet Take 1 tablet by mouth Daily. 30 tablet 11   • silodosin (RAPAFLO) 8 MG capsule capsule 1 capsule Daily.     • spironolactone (ALDACTONE) 50 MG tablet Take 50 mg by mouth Daily.     • [DISCONTINUED] azithromycin (Zithromax Z-Rustam) 250 MG tablet Take 2 tablets the first day, then 1 tablet daily for 4 days. 6 tablet 0   • [DISCONTINUED] naproxen sodium (ALEVE) 220 MG tablet Take 440 mg by mouth 2 (Two) Times a Day As Needed for Mild Pain .       No current facility-administered medications on file prior to visit.     New Medications Ordered This Visit   Medications   • Diclofenac Sodium (Voltaren) 1 % gel gel     Sig: Apply 4 g topically to the appropriate area as directed 4 (Four) Times a Day As Needed (right knee pain).     Dispense:  100 g     Refill:  1       Social History     Tobacco Use   Smoking Status Never Smoker   Smokeless Tobacco Never Used        Objective   Vital Signs:   Vitals:    06/28/21 1308   BP: 126/82   Pulse: 71   Resp: 20   Temp: 97.5 °F (36.4 °C)   TempSrc: Temporal   SpO2: 97%   Weight: (!) 155 kg (341 lb)   Height: 182.9 cm (72\")   PainSc:   7   PainLoc: Knee      Physical Exam  Vitals reviewed.   Constitutional:       General: He is not in acute distress.     Appearance: Normal appearance.   HENT:      Head: Normocephalic and atraumatic.   Eyes:      General: No scleral icterus.     Extraocular Movements: Extraocular movements intact.      Conjunctiva/sclera: Conjunctivae normal.   Cardiovascular:      Rate and Rhythm: Normal rate and regular rhythm.      Heart sounds: Normal heart " sounds. No murmur heard.     Pulmonary:      Effort: Pulmonary effort is normal. No respiratory distress.      Breath sounds: Normal breath sounds. No stridor. No wheezing or rhonchi.   Musculoskeletal:         General: Swelling and signs of injury present.      Cervical back: Normal range of motion and neck supple.      Comments: Limited flexion and extension of right knee, mild-moderate swelling of knee noted   Skin:     General: Skin is warm and dry.      Coloration: Skin is not jaundiced.   Neurological:      General: No focal deficit present.      Mental Status: He is alert and oriented to person, place, and time.      Gait: Gait normal.   Psychiatric:         Mood and Affect: Mood normal.         Behavior: Behavior normal.          Result Review :                   Assessment and Plan    Diagnoses and all orders for this visit:    1. Acute pain of right knee (Primary)  Assessment & Plan:  New/worsening, refer to ortho for eval. Adv against oral nsaids as he is on eliquis, can take tylenol prn for pain, ice and elevate for swelling, suggested compression wrap for swelling and stability. Adv rest, can swim in the pool for exercise but avoid weight bearing exercise until eval by ortho.   rx topical voltaren    Orders:  -     Ambulatory Referral to Orthopedic Surgery  -     Diclofenac Sodium (Voltaren) 1 % gel gel; Apply 4 g topically to the appropriate area as directed 4 (Four) Times a Day As Needed (right knee pain).  Dispense: 100 g; Refill: 1            Follow Up   Return if symptoms worsen or fail to improve, for Next scheduled follow up.      Follow up if symptoms worsen or persist or has new or concerning symptoms, go to ER for severe symptoms.   Reviewed common medication effects and side effects and advised to report side effects immediately, the patient expressed good understanding.  Encouraged medication compliance and the importance of keeping scheduled follow up appointments with me and any other  providers  If labs or images are ordered we will contact you with the results within the next week.  If you have not heard from us after a week please call our office to inquire about the results.   Patient was given instructions and counseling regarding his condition or for health maintenance advice. Please see specific information pulled into the AVS if appropriate.     Caroline Emery PA-C    * Please note that portions of this note may have been completed with a voice recognition program. Efforts were made to edit the dictation but occasionally words are erroneously transcribed.

## 2021-07-06 ENCOUNTER — OFFICE VISIT (OUTPATIENT)
Dept: ORTHOPEDIC SURGERY | Facility: CLINIC | Age: 61
End: 2021-07-06

## 2021-07-06 VITALS
HEIGHT: 72 IN | DIASTOLIC BLOOD PRESSURE: 80 MMHG | WEIGHT: 315 LBS | SYSTOLIC BLOOD PRESSURE: 140 MMHG | BODY MASS INDEX: 42.66 KG/M2

## 2021-07-06 DIAGNOSIS — M25.461 EFFUSION OF RIGHT KNEE: ICD-10-CM

## 2021-07-06 DIAGNOSIS — I89.0 LYMPHEDEMA OF BOTH LOWER EXTREMITIES: ICD-10-CM

## 2021-07-06 DIAGNOSIS — E66.01 CLASS 3 SEVERE OBESITY DUE TO EXCESS CALORIES WITHOUT SERIOUS COMORBIDITY WITH BODY MASS INDEX (BMI) OF 45.0 TO 49.9 IN ADULT (HCC): ICD-10-CM

## 2021-07-06 DIAGNOSIS — M25.561 RIGHT KNEE PAIN, UNSPECIFIED CHRONICITY: ICD-10-CM

## 2021-07-06 DIAGNOSIS — M17.11 PRIMARY OSTEOARTHRITIS OF RIGHT KNEE: Primary | ICD-10-CM

## 2021-07-06 PROCEDURE — 20610 DRAIN/INJ JOINT/BURSA W/O US: CPT | Performed by: ORTHOPAEDIC SURGERY

## 2021-07-06 PROCEDURE — 99204 OFFICE O/P NEW MOD 45 MIN: CPT | Performed by: ORTHOPAEDIC SURGERY

## 2021-07-06 RX ORDER — ACETAMINOPHEN 500 MG
500 TABLET ORAL EVERY 6 HOURS PRN
COMMUNITY
End: 2022-01-12

## 2021-07-06 RX ORDER — TRIAMCINOLONE ACETONIDE 40 MG/ML
80 INJECTION, SUSPENSION INTRA-ARTICULAR; INTRAMUSCULAR
Status: COMPLETED | OUTPATIENT
Start: 2021-07-06 | End: 2021-07-06

## 2021-07-06 RX ORDER — LIDOCAINE HYDROCHLORIDE 10 MG/ML
3 INJECTION, SOLUTION EPIDURAL; INFILTRATION; INTRACAUDAL; PERINEURAL
Status: COMPLETED | OUTPATIENT
Start: 2021-07-06 | End: 2021-07-06

## 2021-07-06 RX ORDER — BUPIVACAINE HYDROCHLORIDE 2.5 MG/ML
3 INJECTION, SOLUTION EPIDURAL; INFILTRATION; INTRACAUDAL
Status: COMPLETED | OUTPATIENT
Start: 2021-07-06 | End: 2021-07-06

## 2021-07-06 RX ADMIN — TRIAMCINOLONE ACETONIDE 80 MG: 40 INJECTION, SUSPENSION INTRA-ARTICULAR; INTRAMUSCULAR at 08:29

## 2021-07-06 RX ADMIN — BUPIVACAINE HYDROCHLORIDE 3 ML: 2.5 INJECTION, SOLUTION EPIDURAL; INFILTRATION; INTRACAUDAL at 08:29

## 2021-07-06 RX ADMIN — LIDOCAINE HYDROCHLORIDE 3 ML: 10 INJECTION, SOLUTION EPIDURAL; INFILTRATION; INTRACAUDAL; PERINEURAL at 08:29

## 2021-07-06 NOTE — PROGRESS NOTES
Procedure   Large Joint Arthrocentesis: R knee  Date/Time: 7/6/2021 8:29 AM  Consent given by: patient  Site marked: site marked  Timeout: Immediately prior to procedure a time out was called to verify the correct patient, procedure, equipment, support staff and site/side marked as required   Supporting Documentation  Indications: pain   Procedure Details  Location: knee - R knee  Preparation: Patient was prepped and draped in the usual sterile fashion  Needle size: 23 G  Approach: anterolateral  Medications administered: 3 mL bupivacaine (PF) 0.25 %; 3 mL lidocaine PF 1% 1 %; 80 mg triamcinolone acetonide 40 MG/ML  Patient tolerance: patient tolerated the procedure well with no immediate complications

## 2021-07-06 NOTE — PROGRESS NOTES
Orthopaedic Clinic Note: Knee New Patient    Chief Complaint   Patient presents with   • Right Knee - Pain        HPI  Consult from: CHIARA Childs Grzegorz is a 61 y.o. male who presents with right knee pain for 3 week(s). Onset atraumatic and gradual in nature. Pain is localized to the entire knee (globally) and is a 9/10 on the pain scale. Pain is described as aching and shooting. Associated symptoms include pain, swelling and giving way/buckling. The pain is worse with walking, standing, climbing stairs and working; resting, sitting, ice, pain medication and/or NSAID, lying down and elevating the extremity make it better. Previous treatments have included: Tylenol since symptom onset. Although some transient relief was reported with these interventions, these conservative measures have failed and symptoms have persisted. The patient is limited in daily activities and has had a significant decrease in quality of life as a result. He denies fevers, chills, or constitutional symptoms.  He is significantly overweight with a BMI of 46.3.    I have reviewed the following portions of the patient's history:History of Present Illness    Past Medical History:   Diagnosis Date   • Abnormal adenosine sestamibi study    • Anxiety    • BCC (basal cell carcinoma of skin) 2018    nose   • Depression     Dr. Peterson   • Echocardiogram abnormal 04/26/2021    Dr Melgar - EF 60%, mild LVH, mild MR, TR, moderately dialted LA   • Fatty liver 2016    by CT and US   • H/O cardiovascular stress test 04/2010    negative   • H/O colonoscopy 12/2014    Dr. Olivier- neg. repeat in 3-5 yrs, previous h/o adenomatous polyp '12 & '13   • Heart disease    • History of diagnostic ultrasound 04/2016    fatty liver, multiple hypodense lesions in liver, GB nml, CT liver recommended   • Hyperlipidemia    • Hypertension    • Hypothyroidism    • Multiple hemangiomas 05/01/2016    4 hemangiomas in liver - liver protocol CT at 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 - Dr. Amin   • Osteoarthritis    • Paroxysmal atrial fibrillation (CMS/HCC) 2015   • Screening PSA (prostate specific antigen) 2016    0.4; 2/15 0.4;  0.4   • Skin cancer of face       Past Surgical History:   Procedure Laterality Date   • ANAL FISTULA REPAIR       &    • CARDIAC ABLATION     • COLONOSCOPY     • JOINT REPLACEMENT Right    • MOHS SURGERY Left     left ala - BCC   • SEPTOPLASTY     • ARCENIO  2015    ARCENIO/external cardioversion into normal sinus rhythm with initiation of flecainide, 2015.  EF 55% to 60%.   • TONSILLECTOMY     • TOTAL HIP ARTHROPLASTY Right     Right total hip replacement. (h/o childhood fracture)   • TOTAL HIP ARTHROPLASTY Left 2019    Dr Allen   • VENOUS ABLATION      Status post cryoablation of the pulmonary veins, 2015, with maintenance of sinus rhythm on no antiarrhythmic.      Family History   Problem Relation Age of Onset   • Hypothyroidism Mother          in 80's   • Cancer Mother    • Stroke Mother    • Cancer Father    • Diabetes type II Father    • Hypertension Father    • Coronary artery disease Father         60;s   • Deep vein thrombosis Father    • Obesity Other    • Coronary artery disease Other      Social History     Socioeconomic History   • Marital status:      Spouse name: Not on file   • Number of children: 2   • Years of education: Not on file   • Highest education level: Not on file   Tobacco Use   • Smoking status: Never Smoker   • Smokeless tobacco: Never Used   Substance and Sexual Activity   • Alcohol use: Yes     Alcohol/week: 2.0 standard drinks     Types: 2 Glasses of wine per week     Comment: 3 X weekly    • Drug use: No   • Sexual activity: Defer      Current Outpatient Medications on File Prior to Visit   Medication Sig Dispense  Refill   • acetaminophen (TYLENOL) 500 MG tablet Take 500 mg by mouth Every 6 (Six) Hours As Needed for Mild Pain .     • albuterol sulfate  (90 Base) MCG/ACT inhaler Inhale 2 puffs Every 4 (Four) Hours As Needed for Shortness of Air. 18 g 11   • apixaban (Eliquis) 5 MG tablet tablet Take 1 tablet by mouth Every 12 (Twelve) Hours. 180 tablet 3   • Diclofenac Sodium (Voltaren) 1 % gel gel Apply 4 g topically to the appropriate area as directed 4 (Four) Times a Day As Needed (right knee pain). 100 g 1   • Fluticasone Furoate-Vilanterol (Breo Ellipta) 100-25 MCG/INH inhaler Inhale 1 puff Daily. 1 inhaler 5   • furosemide (LASIX) 40 MG tablet TAKE 1 TABLET BY MOUTH ONCE DAILY IF NEEDED FOR SWELLING 90 tablet 0   • levothyroxine (SYNTHROID, LEVOTHROID) 100 MCG tablet TAKE 1 TABLET BY MOUTH DAILY 30 tablet 5   • losartan (COZAAR) 50 MG tablet Take 50 mg by mouth Daily.     • metoprolol tartrate (LOPRESSOR) 25 MG tablet Take 1 tablet by mouth 2 (Two) Times a Day. 180 tablet 1   • omeprazole (priLOSEC) 40 MG capsule TAKE 1 CAPSULE BY MOUTH DAILY 30 capsule 5   • rosuvastatin (CRESTOR) 10 MG tablet Take 1 tablet by mouth Daily. 30 tablet 11   • silodosin (RAPAFLO) 8 MG capsule capsule 1 capsule Daily.     • spironolactone (ALDACTONE) 50 MG tablet Take 50 mg by mouth Daily.       No current facility-administered medications on file prior to visit.      No Known Allergies     Review of Systems   Constitutional: Negative.    HENT: Negative.    Eyes: Negative.    Respiratory: Negative.    Cardiovascular: Negative.    Gastrointestinal: Negative.    Endocrine: Negative.    Genitourinary: Negative.    Musculoskeletal: Positive for arthralgias.   Skin: Negative.    Allergic/Immunologic: Negative.    Neurological: Negative.    Hematological: Negative.    Psychiatric/Behavioral: Negative.         The patient's Review of Systems was personally reviewed and confirmed as accurate.    The following portions of the patient's history  "were reviewed and updated as appropriate: allergies, current medications, past family history, past medical history, past social history, past surgical history and problem list.    Physical Exam  Blood pressure 140/80, height 182.9 cm (72.01\"), weight (!) 155 kg (341 lb 11.4 oz).    Body mass index is 46.33 kg/m².    GENERAL APPEARANCE: awake, alert & oriented x 3, in no acute distress, well developed, well nourished and obese  PSYCH: normal affect  LUNGS:  breathing nonlabored  EYES: sclera anicteric  CARDIOVASCULAR: palpable dorsalis pedis, palpable posterior tibial bilaterally. Capillary refill less than 2 seconds  EXTREMITIES: no clubbing, cyanosis  GAIT:  Antalgic            Right Lower Extremity Exam:   ----------  Hip Exam  ----------  FLEXION CONTRACTURE: None  FLEXION: 110 degrees  INTERNAL ROTATION: 20 degrees at 90 degrees of flexion   EXTERNAL ROTATION: 40 degrees at 90 degrees of flexion    PAIN WITH HIP MOTION: no  ----------  Knee Exam  ----------  ALIGNMENT: neutral    RANGE OF MOTION:  Decreased (10 - 100 degrees) with no extensor lag  LIGAMENTOUS STABILITY:   stable to varus and valgus stress at terminal extension and 30 degrees without any evidence of laxity     STRENGTH:  4/5 knee flexion, extension. 5/5 ankle dorsiflexion and plantarflexion.     PAIN WITH PALPATION: global  KNEE EFFUSION: yes, mild effusion  PAIN WITH KNEE ROM: yes, global  PATELLAR CREPITUS: yes, painful and symptomatic  SPECIAL EXAM FINDINGS:  none    REFLEXES:  PATELLAR 2+/4  ACHILLES 2+/4    CLONUS: no  STRAIGHT LEG TEST:   negative    SENSATION TO LIGHT TOUCH:  DEEP PERONEAL/SUPERFICIAL PERONEAL/SURAL/SAPHENOUS/TIBIAL:   intact    EDEMA:  yes, 1+ edema to mid tibia  ERYTHEMA:  no  WOUNDS/INCISIONS:  no        Left Lower Extremity Exam:   ----------  Hip Exam  ----------  FLEXION CONTRACTURE: None  FLEXION: 110 degrees  INTERNAL ROTATION: 20 degrees at 90 degrees of flexion   EXTERNAL ROTATION: 40 degrees at 90 degrees of " flexion    PAIN WITH HIP MOTION: no  ----------  Knee Exam  ----------  ALIGNMENT: neutral, no varus or valgus deformity     RANGE OF MOTION:  Normal (0-120 degrees) with no extensor lag or flexion contracture  LIGAMENTOUS STABILITY:   stable to varus and valgus stress at terminal extension and 30 degrees without any evidence of laxity     STRENGTH:  5/5 knee flexion, extension. 5/5 ankle dorsiflexion and plantarflexion.     PAIN WITH PALPATION: denies tenderness to palpation about the knee, denies medial or lateral joint line pain  KNEE EFFUSION: no  PAIN WITH KNEE ROM: no  PATELLAR CREPITUS: yes, painful and symptomatic  SPECIAL EXAM FINDINGS:  Negative patellar compression    REFLEXES:  PATELLAR 2+/4  ACHILLES 2+/4    CLONUS: negative  STRAIGHT LEG TEST:   negative    SENSATION TO LIGHT TOUCH:  DEEP PERONEAL/SUPERFICIAL PERONEAL/SURAL/SAPHENOUS/TIBIAL:   intact    EDEMA: 1+ edema to mid tibia  ERYTHEMA:  no  WOUNDS/INCISIONS: no overlying skin problems.    ______________________________________________________________________  ______________________________________________________________________    RADIOGRAPHIC FINDINGS:   Indication: Right knee pain    Comparison: No prior xrays are available for comparison    Right knee(s) 4 views: Mild tricompartmental osteoarthritis with no acute bony injury or fracture.  Small periarticular osteophytes visualized in all compartments.  Neutral alignment.      Assessment/Plan:   Diagnosis Plan   1. Primary osteoarthritis of right knee     2. Right knee pain, unspecified chronicity  XR Knee 4+ View Right   3. Class 3 severe obesity due to excess calories without serious comorbidity with body mass index (BMI) of 45.0 to 49.9 in adult (CMS/HCC)     4. Lymphedema of both lower extremities     5. Effusion of right knee       Patient has mild arthritis of the right knee with a large joint effusion.  I discussed treatment options with him.  He is unable take anti-inflammatory  secondary to being on Eliquis.  He is agreeable to cortisone injection the right knee today.  He will follow-up in 3 months for repeat assessment.    Patient has an elevated BMI. The patient has been instructed on various weight loss avenues including diet, portion control, calorie restriction, low/no impact exercise, referral to weight loss management and/or bariatric surgery.  It was explained that weight loss can improve joint pain alone by decreasing the joint reaction forces.  For every pound of weight change, the knee and hip joints see a 4 to 5 fold change in pressure.  Given these options, the patient will proceed with low calorie diet and low impact exercise.    Patient has evidence of lymphedema involving the lower extremity/extremities.  I recommended compression stockings to improve this condition.  A list of local retail stores where the compression stockings can be purchased were provided.  Patient was instructed to purchase compression stockings and wear whenever the lower extremities are in a dependent position.    Procedure Note:  I discussed with the patient the potential benefits of performing a therapeutic injection of the right knee as well as potential risks including but not limited to infection, swelling, pain, bleeding, bruising, nerve/vessel damage, skin color changes, transient elevation in blood glucose levels, and fat atrophy. After informed consent and verifying correct patient, procedure site, and type of procedure, the area was prepped with alcohol, ethyl chloride was used to numb the skin. Via the superior lateral approach, 3cc of 1% lidocaine, 3cc of 0.25% bupivicaine and 2 cc of 40mg/ml of Kenalog were injected into the right knee. The patient tolerated the procedure well. There were no complications. A sterile dressing was placed over the injection site.      David Peterson MD  07/06/21  08:31 EDT

## 2021-08-04 ENCOUNTER — TELEPHONE (OUTPATIENT)
Dept: ORTHOPEDIC SURGERY | Facility: CLINIC | Age: 61
End: 2021-08-04

## 2021-08-04 NOTE — TELEPHONE ENCOUNTER
PATIENT CALLED AND STATED HE HAS SWELLING IN HIS RIGHT KNEE AND IS PAINFUL. HIS LEFT KNEE IS ALSO SWELLING AND PAINFUL. PATIENT RECEIVED A STEROID INJECTIONS ON 7/6/21 IN RIGHT KNEE. PATIENT CAN BE REACHED @ [phone]

## 2021-08-04 NOTE — TELEPHONE ENCOUNTER
Called patient back. He is having more swelling of the entire legs on both sides since he was here last. He states that his left knee is also bothering him now. The injection in the right knee helped for apx 3 weeks. I made him an appt to see Dr Peterson next week on 08/12/21. In the meantime, I suggested taht he contact his PCP about the swelling in his legs and see if they want him to increase his fluid pill. He will contact PCP and keep appt for next week here in the office.     Kiki

## 2021-08-17 RX ORDER — APIXABAN 5 MG/1
TABLET, FILM COATED ORAL
Qty: 180 TABLET | Refills: 3 | Status: SHIPPED | OUTPATIENT
Start: 2021-08-17 | End: 2022-07-18 | Stop reason: ALTCHOICE

## 2021-08-20 ENCOUNTER — TELEPHONE (OUTPATIENT)
Dept: INTERNAL MEDICINE | Facility: CLINIC | Age: 61
End: 2021-08-20

## 2021-08-20 NOTE — TELEPHONE ENCOUNTER
Patient returned call, reviewed Dr. Chandler's messages with patient. He verbalized understanding and made an appointment for Monday.

## 2021-08-20 NOTE — TELEPHONE ENCOUNTER
Caller: Katharine Lantigua    Relationship: Self    Best call back number: [phone]    What is the best time to reach you: ANYTIME    Who are you requesting to speak with (clinical staff, provider,  specific staff member): CLINICAL STAFF OR PROVIDER    What was the call regarding: PATIENT STATES THAT HE HAS PAIN IN RIGHT KNEE AND WASN'T ABLE TO GET ANOTHER CORTISONE SHOT. PATIENT WOULD LIKE TO BE ADVISED ON WHAT HE COULD DO FOR RELIEF TODAY. PATIENT IS REQUESTING DR. KNAPP TO LOOK AT HIS RECORDS FROM THE URGENT CARE    Do you require a callback: YES

## 2021-08-20 NOTE — TELEPHONE ENCOUNTER
Called and left voicemail for patient on his self-identified voicemail box regarding Dr. Chandler's message. Message stated that Dr. Chandler recommends an appointment next week to further evaluate knee pain and to discuss treatment options. Informed him to continue the Tylenol and Lasix as needed over the weekend and if pain is worse to proceed to ED for further evaluation. Advised him to keep appointment as scheduled with Dr. Peterson as well. Office number was given incase patient has further questions.

## 2021-08-20 NOTE — TELEPHONE ENCOUNTER
I  think he will need to be seen again in the office to  discuss options and other testing, if he can schedule appt next week.  Would continue tylenol and the lasix over the weekend. If pain is severe would go to ER . It looks like he has f/u w/ Dr Peterson as well

## 2021-08-23 ENCOUNTER — OFFICE VISIT (OUTPATIENT)
Dept: INTERNAL MEDICINE | Facility: CLINIC | Age: 61
End: 2021-08-23

## 2021-08-23 VITALS
HEIGHT: 72 IN | BODY MASS INDEX: 42.66 KG/M2 | HEART RATE: 69 BPM | TEMPERATURE: 97.1 F | OXYGEN SATURATION: 94 % | DIASTOLIC BLOOD PRESSURE: 80 MMHG | WEIGHT: 315 LBS | SYSTOLIC BLOOD PRESSURE: 112 MMHG

## 2021-08-23 DIAGNOSIS — M25.561 ACUTE PAIN OF RIGHT KNEE: Primary | ICD-10-CM

## 2021-08-23 DIAGNOSIS — R60.0 BILATERAL LOWER EXTREMITY EDEMA: Chronic | ICD-10-CM

## 2021-08-23 PROCEDURE — 99214 OFFICE O/P EST MOD 30 MIN: CPT | Performed by: INTERNAL MEDICINE

## 2021-08-23 RX ORDER — METHYLPREDNISOLONE 4 MG/1
TABLET ORAL
COMMUNITY
Start: 2021-08-20 | End: 2021-08-26

## 2021-08-23 RX ORDER — TRAMADOL HYDROCHLORIDE 50 MG/1
50 TABLET ORAL EVERY 8 HOURS PRN
Qty: 20 TABLET | Refills: 2 | Status: SHIPPED | OUTPATIENT
Start: 2021-08-23 | End: 2022-07-18

## 2021-08-23 NOTE — PROGRESS NOTES
Chief Complaint  Knee Pain (right knee pain and swelling, on 8/18 prescribed a steroid pack from cardiologist)    Subjective          Katharine Lantigua presents to [facility]  History of Present Illness    R knee pain -he hyperextended his knee 2 months ago.  He saw Caroline here initially in June and she referred him to Dr. Peterson.  Received an injection in his knee there some initially, but pain has recently  He had mild arthritis on the x-ray and a large effusion  He was also seen At the urgent treatment center 2 weeks ago, and was given a Decadron injection.    He is taking Tylenol 2of the 500 mg tid - not much help  Using Voltaren gel- not much help. Blue Emu- helps a little  Minimal etoh- once a week  Dr Melgar, his cardiologist, gave him a steroid pack which has helped (on it currently)  Has not been able to swim because of knee, and hurts to walk, so has been frustrated in his efforts to lose weight    He is wondering about whether he has vascular disease.we had done venous duplex in '19 which was negative for clot.   He does have venous stasis and varicose veins (saw Dr duque in May, but no surgical intervention recommneded). He hsa been using stockings more and elevating legs      Current Outpatient Medications:   •  acetaminophen (TYLENOL) 500 MG tablet, Take 500 mg by mouth Every 6 (Six) Hours As Needed for Mild Pain ., Disp: , Rfl:   •  albuterol sulfate  (90 Base) MCG/ACT inhaler, Inhale 2 puffs Every 4 (Four) Hours As Needed for Shortness of Air., Disp: 18 g, Rfl: 11  •  Diclofenac Sodium (Voltaren) 1 % gel gel, Apply 4 g topically to the appropriate area as directed 4 (Four) Times a Day As Needed (right knee pain)., Disp: 100 g, Rfl: 1  •  Eliquis 5 MG tablet tablet, TAKE 1 TABLET BY MOUTH EVERY 12 HOURS, Disp: 180 tablet, Rfl: 3  •  Fluticasone Furoate-Vilanterol (Breo Ellipta) 100-25 MCG/INH inhaler, Inhale 1 puff Daily., Disp: 1 inhaler, Rfl: 5  •  furosemide (LASIX)  "40 MG tablet, TAKE 1 TABLET BY MOUTH ONCE DAILY IF NEEDED FOR SWELLING, Disp: 90 tablet, Rfl: 0  •  levothyroxine (SYNTHROID, LEVOTHROID) 100 MCG tablet, TAKE 1 TABLET BY MOUTH DAILY, Disp: 30 tablet, Rfl: 5  •  losartan (COZAAR) 50 MG tablet, Take 50 mg by mouth Daily., Disp: , Rfl:   •  metoprolol tartrate (LOPRESSOR) 25 MG tablet, Take 1 tablet by mouth 2 (Two) Times a Day., Disp: 180 tablet, Rfl: 1  •  omeprazole (priLOSEC) 40 MG capsule, TAKE 1 CAPSULE BY MOUTH DAILY, Disp: 30 capsule, Rfl: 5  •  rosuvastatin (CRESTOR) 10 MG tablet, Take 1 tablet by mouth Daily., Disp: 30 tablet, Rfl: 11  •  silodosin (RAPAFLO) 8 MG capsule capsule, 1 capsule Daily., Disp: , Rfl:   •  spironolactone (ALDACTONE) 50 MG tablet, Take 50 mg by mouth Daily., Disp: , Rfl:   •  methylPREDNISolone (MEDROL) 4 MG dose pack, follow package directions, Disp: , Rfl:   •  traMADol (ULTRAM) 50 MG tablet, Take 1 tablet by mouth Every 8 (Eight) Hours As Needed for Moderate Pain ., Disp: 20 tablet, Rfl: 2      Objective   Vital Signs:   /80 (BP Location: Right arm, Patient Position: Sitting)   Pulse 69   Temp 97.1 °F (36.2 °C) (Infrared)   Ht 182.9 cm (72\")   Wt (!) 152 kg (335 lb 12.8 oz)   SpO2 94%   BMI 45.54 kg/m²       Physical exam  Constitutional: oriented to person, place, and time.  well-developed and well-nourished. No distress.   HENT:   Head: Normocephalic and atraumatic.   Eyes: Conjunctivae and EOM are normal.   Cardiovascular: Normal rate, regular rhythm and normal heart sounds.  Exam reveals no gallop and no friction rub.    No murmur heard.  Pulmonary/Chest: Effort normal and breath sounds normal. No respiratory distress.   no wheezes.   Neurological:  alert and oriented to person, place, and time.   Skin: Skin is warm and dry. not diaphoretic.   PV: trace edema left ankle, none in right ankle.   MS: R knee - no tenderness today; ?small effusion.  Psychiatric:  normal mood and affect. behavior is normal. Judgment and " thought content normal.      Result Review :   The following data was reviewed by: Gail Chandler MD on 08/23/2021:  CMP    CMP 10/14/20 4/8/21   Glucose 108 (A) 100 (A)   BUN 21 14   Creatinine 0.99 0.97   eGFR Non African Am 77 79   Sodium 144 144   Potassium 4.5 4.3   Chloride 109 (A) 106   Calcium 9.3 9.2   Albumin  4.20   Total Bilirubin  0.4   Alkaline Phosphatase  53   AST (SGOT)  23   ALT (SGPT)  28   (A) Abnormal value            CBC    CBC 10/14/20 4/8/21   WBC 6.03 5.60   RBC 4.47 4.58   Hemoglobin 14.3 15.0   Hematocrit 42.1 42.0   MCV 94.2 91.7   MCH 32.0 32.8   MCHC 34.0 35.7   RDW 13.3 13.0   Platelets 257 272           TSH    TSH 10/14/20 4/8/21   TSH 3.260 2.550           Lab Results   Component Value Date    OBXC09AA 32.2 10/26/2016    [id number] 378 04/08/2021    URICACID 5.8 04/08/2021     Data reviewed: Radiologic studies knee xr, Consultant notes ortho note and Recent hospitalization notes [facility] note          Assessment and Plan    Diagnoses and all orders for this visit:    1. Acute pain of right knee (Primary)-pt concerned that the knee swelling is from vascular disease or other systemic issue, but I feel it is more in the joint, and not from his venous stasis. We discussed this today. I will write for tramdol to use as needed in evenings for when pain is severe, since tylenol is not helping a lot. Controlled substance contract reviewed and signed by pt. Adverse effects and risks of addiction of medication reviewed with pt. Dequan done today and appropriate.   Comments:  pt  has appt later this week w/ Dr Peterson  Orders:  -     traMADol (ULTRAM) 50 MG tablet; Take 1 tablet by mouth Every 8 (Eight) Hours As Needed for Moderate Pain .  Dispense: 20 tablet; Refill: 2    2. Bilateral lower extremity edema- looks good today - just minimal left LE edema today. Continue compression stockings, elevation, weight loss        Follow Up   No follow-ups on file.  Patient was given instructions and  counseling regarding his condition or for health maintenance advice. Please see specific information pulled into the AVS if appropriate.

## 2021-08-26 ENCOUNTER — OFFICE VISIT (OUTPATIENT)
Dept: ORTHOPEDIC SURGERY | Facility: CLINIC | Age: 61
End: 2021-08-26

## 2021-08-26 VITALS
WEIGHT: 315 LBS | HEIGHT: 72 IN | BODY MASS INDEX: 42.66 KG/M2 | HEART RATE: 69 BPM | SYSTOLIC BLOOD PRESSURE: 123 MMHG | DIASTOLIC BLOOD PRESSURE: 84 MMHG

## 2021-08-26 DIAGNOSIS — M25.461 EFFUSION OF RIGHT KNEE: Primary | ICD-10-CM

## 2021-08-26 DIAGNOSIS — M25.562 LEFT KNEE PAIN, UNSPECIFIED CHRONICITY: ICD-10-CM

## 2021-08-26 DIAGNOSIS — M17.0 PRIMARY OSTEOARTHRITIS OF BOTH KNEES: ICD-10-CM

## 2021-08-26 PROCEDURE — 99214 OFFICE O/P EST MOD 30 MIN: CPT | Performed by: ORTHOPAEDIC SURGERY

## 2021-08-26 NOTE — PROGRESS NOTES
Orthopaedic Clinic Note: Knee Established Patient    Chief Complaint   Patient presents with   • Right Knee - Follow-up     7 week follow up; Primary osteoarthritis of right knee-last cortisone injection given on 7/6/21   • Left Knee - Pain        HPI    It has been 7  week(s) since Mr. Lantigua's last visit. He returns to clinic today for follow-up right knee pain.  He is continues to have focal swelling and tenderness in the knee that has been refractory to cortisone injections.  He is unable to take oral anti-inflammatory secondary to being on Eliquis.  He is having recurrent swelling despite the injection.  He states that the initial injection gave him about 3 weeks of relief.  He subsequently went to urgent care who provided him with a intramuscular cortisone injection that helped somewhat and then he was prescribed a steroid Dosepak approximately 10 days later which is also providing relief.  Despite all this, he continues to have pain and swelling has been refractory to these conservative measures.    In addition to this, he is starting to have pain in the left knee with some mild swelling that has initiated over the past 2 to 3 weeks.    Past Medical History:   Diagnosis Date   • Abnormal adenosine sestamibi study    • Anxiety    • BCC (basal cell carcinoma of skin) 2018    nose   • Depression     Dr. Peterson   • Echocardiogram abnormal 04/26/2021    Dr Melgar - EF 60%, mild LVH, mild MR, TR, moderately dialted LA   • Fatty liver 2016    by CT and US   • H/O cardiovascular stress test 04/2010    negative   • H/O colonoscopy 12/2014    Dr. Olivier- neg. repeat in 3-5 yrs, previous h/o adenomatous polyp '12 & '13   • Heart disease    • History of diagnostic ultrasound 04/2016    fatty liver, multiple hypodense lesions in liver, GB nml, CT liver recommended   • Hyperlipidemia    • Hypertension    • Hypothyroidism    • Multiple hemangiomas 05/01/2016    4 hemangiomas in liver - liver protocol CT at . 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 - Dr. Amin   • Osteoarthritis    • Paroxysmal atrial fibrillation (CMS/HCC) 2015   • Screening PSA (prostate specific antigen) 2016    0.4; 2/15 0.4;  0.4   • Skin cancer of face       Past Surgical History:   Procedure Laterality Date   • ANAL FISTULA REPAIR       &    • CARDIAC ABLATION     • COLONOSCOPY     • JOINT REPLACEMENT Right    • MOHS SURGERY Left     left ala - BCC   • SEPTOPLASTY     • ARCENIO  2015    ARCENIO/external cardioversion into normal sinus rhythm with initiation of flecainide, 2015.  EF 55% to 60%.   • TONSILLECTOMY     • TOTAL HIP ARTHROPLASTY Right     Right total hip replacement. (h/o childhood fracture)   • TOTAL HIP ARTHROPLASTY Left 2019    Dr Allen   • VENOUS ABLATION      Status post cryoablation of the pulmonary veins, 2015, with maintenance of sinus rhythm on no antiarrhythmic.      Family History   Problem Relation Age of Onset   • Hypothyroidism Mother          in 80's   • Cancer Mother    • Stroke Mother    • Cancer Father    • Diabetes type II Father    • Hypertension Father    • Coronary artery disease Father         60;s   • Deep vein thrombosis Father    • Obesity Other    • Coronary artery disease Other      Social History     Socioeconomic History   • Marital status:      Spouse name: Not on file   • Number of children: 2   • Years of education: Not on file   • Highest education level: Not on file   Tobacco Use   • Smoking status: Never Smoker   • Smokeless tobacco: Never Used   Vaping Use   • Vaping Use: Never used   Substance and Sexual Activity   • Alcohol use: Yes     Alcohol/week: 2.0 standard drinks     Types: 2 Glasses of wine per week     Comment: 3 X weekly    • Drug use: No   • Sexual activity: Defer      Current Outpatient Medications on File Prior to  Visit   Medication Sig Dispense Refill   • acetaminophen (TYLENOL) 500 MG tablet Take 500 mg by mouth Every 6 (Six) Hours As Needed for Mild Pain .     • albuterol sulfate  (90 Base) MCG/ACT inhaler Inhale 2 puffs Every 4 (Four) Hours As Needed for Shortness of Air. 18 g 11   • Diclofenac Sodium (Voltaren) 1 % gel gel Apply 4 g topically to the appropriate area as directed 4 (Four) Times a Day As Needed (right knee pain). 100 g 1   • Eliquis 5 MG tablet tablet TAKE 1 TABLET BY MOUTH EVERY 12 HOURS 180 tablet 3   • Fluticasone Furoate-Vilanterol (Breo Ellipta) 100-25 MCG/INH inhaler Inhale 1 puff Daily. 1 inhaler 5   • furosemide (LASIX) 40 MG tablet TAKE 1 TABLET BY MOUTH ONCE DAILY IF NEEDED FOR SWELLING 90 tablet 0   • levothyroxine (SYNTHROID, LEVOTHROID) 100 MCG tablet TAKE 1 TABLET BY MOUTH DAILY 30 tablet 5   • losartan (COZAAR) 50 MG tablet Take 50 mg by mouth Daily.     • metoprolol tartrate (LOPRESSOR) 25 MG tablet Take 1 tablet by mouth 2 (Two) Times a Day. 180 tablet 1   • omeprazole (priLOSEC) 40 MG capsule TAKE 1 CAPSULE BY MOUTH DAILY 30 capsule 5   • rosuvastatin (CRESTOR) 10 MG tablet Take 1 tablet by mouth Daily. 30 tablet 11   • silodosin (RAPAFLO) 8 MG capsule capsule 1 capsule Daily.     • spironolactone (ALDACTONE) 50 MG tablet Take 50 mg by mouth Daily.     • traMADol (ULTRAM) 50 MG tablet Take 1 tablet by mouth Every 8 (Eight) Hours As Needed for Moderate Pain . 20 tablet 2   • [DISCONTINUED] methylPREDNISolone (MEDROL) 4 MG dose pack follow package directions       No current facility-administered medications on file prior to visit.      No Known Allergies     Review of Systems   Constitutional: Negative.    HENT: Negative.    Eyes: Negative.    Respiratory: Negative.    Cardiovascular: Negative.    Gastrointestinal: Negative.    Endocrine: Negative.    Genitourinary: Negative.    Musculoskeletal: Positive for arthralgias.   Skin: Negative.    Allergic/Immunologic: Negative.   "  Neurological: Negative.    Hematological: Negative.    Psychiatric/Behavioral: Negative.         The patient's Review of Systems was personally reviewed and confirmed as accurate.    Physical Exam  Blood pressure 123/84, pulse 69, height 182.9 cm (72.01\"), weight (!) 152 kg (335 lb 1.6 oz).    Body mass index is 45.44 kg/m².    GENERAL APPEARANCE: awake, alert, oriented, in no acute distress and well developed, well nourished  LUNGS:  breathing nonlabored  EXTREMITIES: no clubbing, cyanosis  PERIPHERAL PULSES: palpable dorsalis pedis and posterior tibial pulses bilaterally.    GAIT:  Antalgic        ----------  Right Knee Exam:  ----------  ALIGNMENT: neutral  ----------  RANGE OF MOTION:  Decreased (5 - 110 degrees) with no extensor lag  LIGAMENTOUS STABILITY:   stable to varus and valgus stress at terminal extension and 30 degrees without any evidence of laxity  ----------  STRENGTH:  KNEE FLEXION 5/5  KNEE EXTENSION  5/5  ANKLE DORSIFLEXION  5/5  ANKLE PLANTARFLEXION  5/5  ----------  PAIN WITH PALPATION:global  KNEE EFFUSION: yes, moderate effusion  PAIN WITH KNEE ROM: yes  PATELLAR CREPITUS:  no  ----------  SENSATION TO LIGHT TOUCH:  DEEP PERONEAL/SUPERFICIAL PERONEAL/SURAL/SAPHENOUS/TIBIAL:    intact  ----------  EDEMA:  no  ERYTHEMA:    no  WOUNDS/INCISIONS:   No  -----------------  Right Knee Exam:  ----------  ALIGNMENT: neutral  ----------  RANGE OF MOTION:   Normal (0 - 120 degrees) with no extensor lag  LIGAMENTOUS STABILITY:   stable to varus and valgus stress at terminal extension and 30 degrees without any evidence of laxity  ----------  STRENGTH:  KNEE FLEXION 5/5  KNEE EXTENSION  5/5  ANKLE DORSIFLEXION  5/5  ANKLE PLANTARFLEXION  5/5  ----------  PAIN WITH PALPATION:global  KNEE EFFUSION: yes, trace effusion  PAIN WITH KNEE ROM: yes  PATELLAR CREPITUS:  no  ----------  SENSATION TO LIGHT TOUCH:  DEEP PERONEAL/SUPERFICIAL PERONEAL/SURAL/SAPHENOUS/TIBIAL:    intact  ----------  EDEMA:  " no  ERYTHEMA:    no  WOUNDS/INCISIONS:   no    _____________________________________________________________________  _____________________________________________________________________    RADIOGRAPHIC FINDINGS:   Indication: Left knee pain    Comparison: No prior xrays are available for comparison    Knee films: mild tricompartmental osteoarthritis with no acute bony injury or fracture.    Assessment/Plan:   Diagnosis Plan   1. Effusion of right knee  MRI Knee Right Without Contrast   2. Left knee pain, unspecified chronicity  XR Knee 4+ View Left   3. Primary osteoarthritis of both knees       Patient is suffering from bilateral knee arthritis.  His right knee pain has been refractory to conservative interventions including cortisone injection.  He has a rapidly recurring effusion.  As a result, I am concerned about intra-articular source of knee inflammation such as meniscal tear.  I recommend obtaining MRI of the right knee to evaluate for meniscal pathology or mechanical source of recurrent knee effusions.  In regards to the left knee, he has mild arthritis that I believe is being exacerbated due to his compensation from the right knee pain.  I will see him back after the MRI of the right knee to discuss results and how to proceed.      David Peterson MD  08/26/21  10:57 EDT

## 2021-09-02 ENCOUNTER — HOSPITAL ENCOUNTER (OUTPATIENT)
Dept: MRI IMAGING | Facility: HOSPITAL | Age: 61
Discharge: HOME | End: 2021-09-02
Admitting: ORTHOPAEDIC SURGERY

## 2021-09-02 DIAGNOSIS — M25.461 EFFUSION OF RIGHT KNEE: ICD-10-CM

## 2021-09-02 PROCEDURE — 73721 MRI JNT OF LWR EXTRE W/O DYE: CPT

## 2021-09-03 PROCEDURE — U0004 COV-19 TEST NON-CDC HGH THRU: HCPCS | Performed by: NURSE PRACTITIONER

## 2021-09-07 ENCOUNTER — OFFICE VISIT (OUTPATIENT)
Dept: ORTHOPEDIC SURGERY | Facility: CLINIC | Age: 61
End: 2021-09-07

## 2021-09-07 VITALS
BODY MASS INDEX: 40.43 KG/M2 | SYSTOLIC BLOOD PRESSURE: 155 MMHG | HEIGHT: 74 IN | DIASTOLIC BLOOD PRESSURE: 100 MMHG | WEIGHT: 315 LBS | HEART RATE: 64 BPM

## 2021-09-07 DIAGNOSIS — M17.11 PRIMARY OSTEOARTHRITIS OF RIGHT KNEE: Primary | ICD-10-CM

## 2021-09-07 DIAGNOSIS — M25.461 EFFUSION OF RIGHT KNEE: ICD-10-CM

## 2021-09-07 PROCEDURE — 20610 DRAIN/INJ JOINT/BURSA W/O US: CPT | Performed by: ORTHOPAEDIC SURGERY

## 2021-09-07 PROCEDURE — 99213 OFFICE O/P EST LOW 20 MIN: CPT | Performed by: ORTHOPAEDIC SURGERY

## 2021-09-07 RX ORDER — TRIAMCINOLONE ACETONIDE 40 MG/ML
80 INJECTION, SUSPENSION INTRA-ARTICULAR; INTRAMUSCULAR
Status: COMPLETED | OUTPATIENT
Start: 2021-09-07 | End: 2021-09-07

## 2021-09-07 RX ORDER — BUPIVACAINE HYDROCHLORIDE 2.5 MG/ML
3 INJECTION, SOLUTION EPIDURAL; INFILTRATION; INTRACAUDAL
Status: COMPLETED | OUTPATIENT
Start: 2021-09-07 | End: 2021-09-07

## 2021-09-07 RX ORDER — LIDOCAINE HYDROCHLORIDE 10 MG/ML
3 INJECTION, SOLUTION EPIDURAL; INFILTRATION; INTRACAUDAL; PERINEURAL
Status: COMPLETED | OUTPATIENT
Start: 2021-09-07 | End: 2021-09-07

## 2021-09-07 RX ADMIN — LIDOCAINE HYDROCHLORIDE 3 ML: 10 INJECTION, SOLUTION EPIDURAL; INFILTRATION; INTRACAUDAL; PERINEURAL at 11:52

## 2021-09-07 RX ADMIN — BUPIVACAINE HYDROCHLORIDE 3 ML: 2.5 INJECTION, SOLUTION EPIDURAL; INFILTRATION; INTRACAUDAL at 11:52

## 2021-09-07 RX ADMIN — TRIAMCINOLONE ACETONIDE 80 MG: 40 INJECTION, SUSPENSION INTRA-ARTICULAR; INTRAMUSCULAR at 11:52

## 2021-09-07 NOTE — PROGRESS NOTES
Procedure   Large Joint Arthrocentesis: R knee  Date/Time: 9/7/2021 11:52 AM  Consent given by: patient  Site marked: site marked  Timeout: Immediately prior to procedure a time out was called to verify the correct patient, procedure, equipment, support staff and site/side marked as required   Supporting Documentation  Indications: pain   Procedure Details  Location: knee - R knee  Preparation: Patient was prepped and draped in the usual sterile fashion  Needle size: 18 G  Medications administered: 3 mL bupivacaine (PF) 0.25 %; 3 mL lidocaine PF 1% 1 %; 80 mg triamcinolone acetonide 40 MG/ML  Aspirate amount: 45 mL  Aspirate: yellow and blood-tinged  Patient tolerance: patient tolerated the procedure well with no immediate complications

## 2021-09-07 NOTE — PROGRESS NOTES
Orthopaedic Clinic Note: Knee Established Patient    Chief Complaint   Patient presents with   • Follow-up     Post MRI 09/02/2021 - Effusion of right knee         HPI    It has been 2  week(s) since Mr. Lantigua's last visit. He returns to clinic today for follow-up right knee pain.  He underwent cortisone injection approximately 2 months ago.  The injection provided about 3 weeks of relief before his pain returned.  He is now having recurrent swelling and stiffness in the knee that he rated a 7/10 on the pain scale.  At his last visit, an MRI was ordered in order to evaluate for intra-articular pathology.  He returns today for the MRI result.  He is continue to have limitation with daily activities.  Any significant physical activities resulted in increased swelling.  He is here to discuss his MRI and treatment options.    Past Medical History:   Diagnosis Date   • Abnormal adenosine sestamibi study    • Anxiety    • BCC (basal cell carcinoma of skin) 2018    nose   • Depression     Dr. Peterson   • Echocardiogram abnormal 04/26/2021    Dr Melgar - EF 60%, mild LVH, mild MR, TR, moderately dialted LA   • Fatty liver 2016    by CT and US   • H/O cardiovascular stress test 04/2010    negative   • H/O colonoscopy 12/2014    Dr. Olivier- neg. repeat in 3-5 yrs, previous h/o adenomatous polyp '12 & '13   • Heart disease    • History of diagnostic ultrasound 04/2016    fatty liver, multiple hypodense lesions in liver, GB nml, CT liver recommended   • Hyperlipidemia    • Hypertension    • Hypothyroidism    • Multiple hemangiomas 05/01/2016    4 hemangiomas in liver - liver protocol CT at . 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 - Dr. Amin   • Osteoarthritis    • Paroxysmal atrial fibrillation (CMS/HCC) 09/2015   • Screening PSA (prostate specific antigen) 04/2016    0.4; 2/15 0.4; 1/14 0.4   • Skin cancer of face        Past Surgical History:   Procedure Laterality Date   • ANAL FISTULA REPAIR       &    • CARDIAC ABLATION     • COLONOSCOPY     • JOINT REPLACEMENT Right    • MOHS SURGERY Left 2018    left ala - BCC   • SEPTOPLASTY     • ARCENIO  2015    ARCENIO/external cardioversion into normal sinus rhythm with initiation of flecainide, 2015.  EF 55% to 60%.   • TONSILLECTOMY     • TOTAL HIP ARTHROPLASTY Right 1999    Right total hip replacement. (h/o childhood fracture)   • TOTAL HIP ARTHROPLASTY Left 2019    Dr Allen   • VENOUS ABLATION      Status post cryoablation of the pulmonary veins, 2015, with maintenance of sinus rhythm on no antiarrhythmic.      Family History   Problem Relation Age of Onset   • Hypothyroidism Mother          in 80's   • Cancer Mother    • Stroke Mother    • Cancer Father    • Diabetes type II Father    • Hypertension Father    • Coronary artery disease Father         60;s   • Deep vein thrombosis Father    • Obesity Other    • Coronary artery disease Other      Social History     Socioeconomic History   • Marital status:      Spouse name: Not on file   • Number of children: 2   • Years of education: Not on file   • Highest education level: Not on file   Tobacco Use   • Smoking status: Never Smoker   • Smokeless tobacco: Never Used   Vaping Use   • Vaping Use: Never used   Substance and Sexual Activity   • Alcohol use: Yes     Alcohol/week: 2.0 standard drinks     Types: 2 Glasses of wine per week     Comment: 3 X weekly    • Drug use: No   • Sexual activity: Defer      Current Outpatient Medications on File Prior to Visit   Medication Sig Dispense Refill   • acetaminophen (TYLENOL) 500 MG tablet Take 500 mg by mouth Every 6 (Six) Hours As Needed for Mild Pain .     • albuterol sulfate  (90 Base) MCG/ACT inhaler Inhale 2 puffs Every 4 (Four) Hours As Needed for Shortness of Air. 18 g 0   • benzonatate (TESSALON) 200 MG capsule Take 1  capsule by mouth 3 (Three) Times a Day As Needed for Cough. 30 capsule 0   • Diclofenac Sodium (Voltaren) 1 % gel gel Apply 4 g topically to the appropriate area as directed 4 (Four) Times a Day As Needed (right knee pain). 100 g 1   • Eliquis 5 MG tablet tablet TAKE 1 TABLET BY MOUTH EVERY 12 HOURS 180 tablet 3   • Fluticasone Furoate-Vilanterol (Breo Ellipta) 100-25 MCG/INH inhaler Inhale 1 puff Daily. 1 inhaler 5   • furosemide (LASIX) 40 MG tablet TAKE 1 TABLET BY MOUTH ONCE DAILY IF NEEDED FOR SWELLING 90 tablet 0   • levothyroxine (SYNTHROID, LEVOTHROID) 100 MCG tablet TAKE 1 TABLET BY MOUTH DAILY 30 tablet 5   • losartan (COZAAR) 50 MG tablet Take 50 mg by mouth Daily.     • methylPREDNISolone (MEDROL) 4 MG dose pack Take as directed on package instructions. 21 tablet 0   • metoprolol tartrate (LOPRESSOR) 25 MG tablet Take 1 tablet by mouth 2 (Two) Times a Day. 180 tablet 1   • omeprazole (priLOSEC) 40 MG capsule TAKE 1 CAPSULE BY MOUTH DAILY 30 capsule 5   • rosuvastatin (CRESTOR) 10 MG tablet Take 1 tablet by mouth Daily. 30 tablet 11   • silodosin (RAPAFLO) 8 MG capsule capsule 1 capsule Daily.     • spironolactone (ALDACTONE) 50 MG tablet Take 50 mg by mouth Daily.     • traMADol (ULTRAM) 50 MG tablet Take 1 tablet by mouth Every 8 (Eight) Hours As Needed for Moderate Pain . 20 tablet 2     No current facility-administered medications on file prior to visit.      No Known Allergies     Review of Systems   Constitutional: Negative.    HENT: Negative.    Eyes: Negative.    Respiratory: Negative.    Cardiovascular: Negative.    Gastrointestinal: Negative.    Endocrine: Negative.    Genitourinary: Negative.    Musculoskeletal: Positive for arthralgias.   Skin: Negative.    Allergic/Immunologic: Negative.    Neurological: Negative.    Hematological: Negative.    Psychiatric/Behavioral: Negative.         The patient's Review of Systems was personally reviewed and confirmed as accurate.    Physical Exam  Blood  "pressure 155/100, pulse 64, height 188 cm (74.02\"), weight (!) 154 kg (339 lb 8.1 oz).    Body mass index is 43.57 kg/m².    GENERAL APPEARANCE: awake, alert, oriented, in no acute distress and well developed, well nourished  LUNGS:  breathing nonlabored  EXTREMITIES: no clubbing, cyanosis  PERIPHERAL PULSES: palpable dorsalis pedis and posterior tibial pulses bilaterally.    GAIT:  Antalgic        ----------  Right Knee Exam:  ----------  ALIGNMENT: neutral  ----------  RANGE OF MOTION:    Decreased (5 - 115 degrees) with no extensor lag  LIGAMENTOUS STABILITY:   stable to varus and valgus stress at terminal extension and 30 degrees without any evidence of laxity  ----------  STRENGTH:  KNEE FLEXION 5/5  KNEE EXTENSION  5/5  ANKLE DORSIFLEXION  5/5  ANKLE PLANTARFLEXION  5/5  ----------  PAIN WITH PALPATION:global  KNEE EFFUSION: yes,  moderate effusion  PAIN WITH KNEE ROM: yes  PATELLAR CREPITUS:  no  ----------  SENSATION TO LIGHT TOUCH:  DEEP PERONEAL/SUPERFICIAL PERONEAL/SURAL/SAPHENOUS/TIBIAL:    intact  ----------  EDEMA:  no  ERYTHEMA:    no  WOUNDS/INCISIONS:   no  _____________________________________________________________________  _____________________________________________________________________    RADIOGRAPHIC FINDINGS:   MRI from 9/2/2021 was personally interpreted of the right knee.  MRI shows evidence of large joint effusion.  Intrasubstance change of the medial meniscus without cj meniscal tear is identified.  There is degenerative changes involving the trochlear cartilage without subchondral edema.      Assessment/Plan:   Diagnosis Plan   1. Primary osteoarthritis of right knee     2. Effusion of right knee       I reviewed the MRI findings with the patient.  I see no MRI evidence for internal derangement of the knee apart from arthritic changes.  I discussed treatment options with him.  He is agreeable to knee aspiration followed by cortisone injection today.  He will follow-up in 3 " months.    Procedure Note:  I discussed with the patient the potential benefits of performing a therapeutic aspiration and injection of the right knee as well as potential risks including but not limited to infection, swelling, pain, bleeding, bruising, nerve/vessel damage, skin color changes, transient elevation in blood glucose levels, and fat atrophy. After informed consent and verifying correct patient, procedure site, and type of procedure, the area was prepped with alcohol, ethyl chloride was used to numb the skin. Via the superior lateral approach, an 18-gauge needle was inserted into the knee joint and 45 cc of blood-tinged yellow joint fluid were aspirated from the knee.  Then, 3cc of 1% lidocaine, 3cc of 0.25% bupivicaine and 2 cc of 40mg/ml of Kenalog were injected into the right knee. The patient tolerated the procedure well. There were no complications. A sterile dressing was placed over the injection site.        David Peterson MD  09/07/21  12:02 EDT

## 2021-09-10 ENCOUNTER — TELEPHONE (OUTPATIENT)
Dept: INTERNAL MEDICINE | Facility: CLINIC | Age: 61
End: 2021-09-10

## 2021-09-10 NOTE — TELEPHONE ENCOUNTER
Forwarded results over to Dr. Melgar's office through [employer]. Called and spoke with patient, informed him that CTA from 2015 has been faxed over to his office.

## 2021-09-10 NOTE — TELEPHONE ENCOUNTER
Caller: Katharine Lantigua    Relationship to patient: Self    Best call back number:[phone]    Patient is needing: PATIENT STATES HE HAD A HEART CT SCAN DONE BY DR SIL ZENG AT [facility]. PATIENT STATES THE RESULTS SHOWED AN ENLARGED AORTA AND WAS ASKED TO REACH OUT TO DR KNAPP BY THE CARDIOLOGIST TO SEE IF THERE ARE ANY PREVIOUS HEART CT SCANS THAT HE HAS DONE IN ORDER TO USE FOR COMPARISON. PATIENT STATES DR ZENG IS GOING TO BE SENDING THE REPORT TO DR KNAPP AS WELL

## 2021-09-10 NOTE — TELEPHONE ENCOUNTER
Yes, there is cta from 11/19/15 that we can fax over to Dr Darrion Melgar's office. I think fax there is [phone], but not 100% sure

## 2021-09-15 ENCOUNTER — TELEPHONE (OUTPATIENT)
Dept: INTERNAL MEDICINE | Facility: CLINIC | Age: 61
End: 2021-09-15

## 2021-09-15 NOTE — TELEPHONE ENCOUNTER
Talked with Dr. Darrion Melgar, patient's cardiologist, today regarding Mr. Lantigua.  Dr. Melgar has done multiple test and heart function seems fine.  CT of the chest did show a thoracic aneurysm of 4.9 cm.  Dr. Melgar has not received our previous CT scan so we will fax this that showed the enlargement of the thoracic aorta at 4.7 in 2015  Dr. Melgar is worried that Mr. Lantigua is excessively eating and continuing to gain weight.  We discussed maybe trying Saxenda to help some with weight loss.  Dr. Melgar is going to talk to them today and may bring this up and patient has an appointment here in October and we could start it then as well  We will fax over the CT report from 2015 to Dr. Melgar's office as well

## 2021-09-16 ENCOUNTER — TELEPHONE (OUTPATIENT)
Dept: INTERNAL MEDICINE | Facility: CLINIC | Age: 61
End: 2021-09-16

## 2021-09-16 NOTE — TELEPHONE ENCOUNTER
----- Message from Gail Chandler MD sent at 9/15/2021  5:13 PM EDT -----  Regarding: CT from November 2015  Could you fax this report to Dr. Lavell Melgar's office.  I think we did last week but we might have had the wrong fax.  I talked with Dr. Melgar today and his fax number is [phone]

## 2021-10-16 NOTE — PROGRESS NOTES
History of Present Illness   Here for a physical    Exercise: he has not been able to walk or do stationary bike because of the hip pain  Diet:  doing low carb currently and trying to cut back on ETOH.  Using D intermittently, but no other vitamins  ETOH - has cut back -may drink once every 8 to 10 days    LE edema - he has been seeing Dr Melgar in cardiology and all the testing has been normal    LBP- sees Dr Vaughn tomorrow. Pain in low back down both buttocks    R knee pain-does have moderate/severe arthritis on MRI and received injections which help but only temporary      Current Outpatient Medications on File Prior to Visit   Medication Sig Dispense Refill   • acetaminophen (TYLENOL) 500 MG tablet Take 500 mg by mouth Every 6 (Six) Hours As Needed for Mild Pain .     • albuterol sulfate  (90 Base) MCG/ACT inhaler Inhale 2 puffs Every 4 (Four) Hours As Needed for Shortness of Air. 18 g 0   • Eliquis 5 MG tablet tablet TAKE 1 TABLET BY MOUTH EVERY 12 HOURS 180 tablet 3   • Fluticasone Furoate-Vilanterol (Breo Ellipta) 100-25 MCG/INH inhaler Inhale 1 puff Daily. 1 inhaler 5   • furosemide (LASIX) 40 MG tablet TAKE 1 TABLET BY MOUTH ONCE DAILY IF NEEDED FOR SWELLING 90 tablet 0   • levothyroxine (SYNTHROID, LEVOTHROID) 100 MCG tablet TAKE 1 TABLET BY MOUTH DAILY 30 tablet 5   • losartan (COZAAR) 50 MG tablet Take 50 mg by mouth Daily.     • metoprolol tartrate (LOPRESSOR) 25 MG tablet TAKE 1 TABLET BY MOUTH TWICE DAILY 180 tablet 1   • omeprazole (priLOSEC) 40 MG capsule TAKE 1 CAPSULE BY MOUTH DAILY 30 capsule 5   • rosuvastatin (CRESTOR) 10 MG tablet Take 1 tablet by mouth Daily. 30 tablet 11   • benzonatate (TESSALON) 200 MG capsule Take 1 capsule by mouth 3 (Three) Times a Day As Needed for Cough. 30 capsule 0   • Diclofenac Sodium (Voltaren) 1 % gel gel Apply 4 g topically to the appropriate area as directed 4 (Four) Times a Day As Needed (right knee pain). 100 g 1   • methylPREDNISolone (MEDROL) 4 MG  "dose pack Take as directed on package instructions. 21 tablet 0   • silodosin (RAPAFLO) 8 MG capsule capsule 1 capsule Daily.     • spironolactone (ALDACTONE) 50 MG tablet Take 50 mg by mouth Daily.     • traMADol (ULTRAM) 50 MG tablet Take 1 tablet by mouth Every 8 (Eight) Hours As Needed for Moderate Pain . 20 tablet 2     No current facility-administered medications on file prior to visit.       The following portions of the patient's history were reviewed and updated as appropriate: allergies, current medications, past family history, past medical history, past social history, past surgical history and problem list.    Review of Systems   Constitutional: Positive for unexpected weight loss. Negative for activity change, appetite change, fever and unexpected weight gain (wt is down).   HENT: Negative.    Eyes: Negative.    Respiratory: Negative for shortness of breath and wheezing.    Cardiovascular: Positive for leg swelling. Negative for chest pain and palpitations.   Gastrointestinal: Negative.    Endocrine: Negative.    Genitourinary: Negative for difficulty urinating and dysuria.   Musculoskeletal: Positive for arthralgias and joint swelling.   Skin: Negative.    Allergic/Immunologic: Negative for immunocompromised state.   Neurological: Negative for seizures, speech difficulty, memory problem and confusion.   Hematological: Does not bruise/bleed easily.   Psychiatric/Behavioral: Negative for agitation.         Objective   /84 (BP Location: Right arm, Patient Position: Sitting)   Pulse 71   Temp 98 °F (36.7 °C) (Infrared)   Ht 186.4 cm (73.4\")   Wt (!) 148 kg (327 lb 3.2 oz)   SpO2 99%   BMI 42.70 kg/m²   Physical Exam   Physical Exam  Vitals and nursing note reviewed.   Constitutional:       General: He is not in acute distress.     Appearance: He is well-developed. He is not diaphoretic.   HENT:      Head: Normocephalic and atraumatic.      Right Ear: External ear normal.      Left Ear: " External ear normal.      Nose: Nose normal.      Mouth/Throat:      Pharynx: No oropharyngeal exudate.   Eyes:      General: No scleral icterus.        Right eye: No discharge.         Left eye: No discharge.      Conjunctiva/sclera: Conjunctivae normal.      Pupils: Pupils are equal, round, and reactive to light.   Neck:      Thyroid: No thyromegaly.   Cardiovascular:      Rate and Rhythm: Normal rate and regular rhythm.      Heart sounds: Normal heart sounds. No murmur heard.      Pulmonary:      Effort: Pulmonary effort is normal. No respiratory distress.      Breath sounds: Normal breath sounds. No stridor. No wheezing or rales.   Abdominal:      General: Bowel sounds are normal. There is no distension.      Palpations: Abdomen is soft. There is no mass.      Tenderness: There is no abdominal tenderness. There is no guarding or rebound.   Musculoskeletal:         General: Swelling (B LE) present. No deformity. Normal range of motion.      Cervical back: Normal range of motion and neck supple.   Lymphadenopathy:      Cervical: No cervical adenopathy.   Skin:     General: Skin is warm and dry.      Coloration: Skin is not pale.      Findings: No erythema or rash.   Neurological:      Mental Status: He is alert and oriented to person, place, and time.      Coordination: Coordination normal.      Deep Tendon Reflexes: Reflexes normal.   Psychiatric:         Behavior: Behavior normal.         Thought Content: Thought content normal.         Judgment: Judgment normal.           Assessment/Plan   Diagnoses and all orders for this visit:    1. Routine general medical examination at a health care facility (Primary)  -     Cancel: POC Urinalysis Dipstick, Automated  -     PSA Screen; Future  -     CBC (No Diff); Future  -     Comprehensive Metabolic Panel; Future  -     Lipid Panel; Future  -     TSH; Future  -     Vitamin B12; Future    2. Need for influenza vaccination  -     FluLaval/Fluarix/Fluzone >6 Months  (9956-1566)    3. Morbid (severe) obesity due to excess calories (HCC)-BMI 42-we will try Saxenda.  Side effects reviewed.  Call if any problems.  If covered, I did give him a co-pay reduction card  -     Liraglutide (SAXENDA) 18 MG/3ML injection pen; Inject 0.6mg under skin daily for week one, THEN 1.2mg daily for week two, THEN 1.8mg daily for week three, then 2.4mg daily for week four.  Dispense: 3 pen; Refill: 0    4. B12 deficiency-B12 low normal last check, recheck today  -     Vitamin B12; Future    5. Primary osteoarthritis involving multiple joints  -     Rheumatoid Factor; Future    6. Prostate cancer screening  -     PSA Screen; Future        Regular exercise, healthy diet.   DT due7/24   Colon due1/24   Shingles - he had shingrex   COVID vaccines - he had  Last PSA was in '18 w/ urology-we will check with labs

## 2021-10-20 ENCOUNTER — OFFICE VISIT (OUTPATIENT)
Dept: INTERNAL MEDICINE | Facility: CLINIC | Age: 61
End: 2021-10-20

## 2021-10-20 VITALS
WEIGHT: 315 LBS | SYSTOLIC BLOOD PRESSURE: 124 MMHG | BODY MASS INDEX: 41.75 KG/M2 | OXYGEN SATURATION: 99 % | HEIGHT: 73 IN | TEMPERATURE: 98 F | HEART RATE: 71 BPM | DIASTOLIC BLOOD PRESSURE: 84 MMHG

## 2021-10-20 DIAGNOSIS — Z23 NEED FOR INFLUENZA VACCINATION: ICD-10-CM

## 2021-10-20 DIAGNOSIS — Z12.5 PROSTATE CANCER SCREENING: ICD-10-CM

## 2021-10-20 DIAGNOSIS — Z00.00 ROUTINE GENERAL MEDICAL EXAMINATION AT A HEALTH CARE FACILITY: Primary | ICD-10-CM

## 2021-10-20 DIAGNOSIS — E53.8 B12 DEFICIENCY: ICD-10-CM

## 2021-10-20 DIAGNOSIS — M15.9 PRIMARY OSTEOARTHRITIS INVOLVING MULTIPLE JOINTS: ICD-10-CM

## 2021-10-20 DIAGNOSIS — E66.01 MORBID (SEVERE) OBESITY DUE TO EXCESS CALORIES (HCC): ICD-10-CM

## 2021-10-20 PROCEDURE — 90686 IIV4 VACC NO PRSV 0.5 ML IM: CPT | Performed by: INTERNAL MEDICINE

## 2021-10-20 PROCEDURE — 99396 PREV VISIT EST AGE 40-64: CPT | Performed by: INTERNAL MEDICINE

## 2021-10-20 PROCEDURE — 90471 IMMUNIZATION ADMIN: CPT | Performed by: INTERNAL MEDICINE

## 2021-11-16 RX ORDER — OMEPRAZOLE 40 MG/1
CAPSULE, DELAYED RELEASE ORAL
Qty: 30 CAPSULE | Refills: 5 | Status: SHIPPED | OUTPATIENT
Start: 2021-11-16 | End: 2022-05-16

## 2021-11-16 RX ORDER — LEVOTHYROXINE SODIUM 0.1 MG/1
100 TABLET ORAL DAILY
Qty: 30 TABLET | Refills: 5 | Status: SHIPPED | OUTPATIENT
Start: 2021-11-16 | End: 2022-05-16

## 2021-11-16 NOTE — TELEPHONE ENCOUNTER
PN he is due to get his labs drawn first before refilling thyroid medication.  He verbalized understanding and will try to come in tomorrow morning.  He has enough meds until he can get the labs drawn.

## 2021-11-18 ENCOUNTER — OFFICE VISIT (OUTPATIENT)
Dept: INTERNAL MEDICINE | Facility: CLINIC | Age: 61
End: 2021-11-18

## 2021-11-18 ENCOUNTER — LAB (OUTPATIENT)
Dept: LAB | Facility: HOSPITAL | Age: 61
End: 2021-11-18

## 2021-11-18 VITALS
WEIGHT: 315 LBS | DIASTOLIC BLOOD PRESSURE: 78 MMHG | HEIGHT: 73 IN | BODY MASS INDEX: 41.75 KG/M2 | HEART RATE: 72 BPM | OXYGEN SATURATION: 99 % | SYSTOLIC BLOOD PRESSURE: 118 MMHG | TEMPERATURE: 97.5 F

## 2021-11-18 DIAGNOSIS — H65.01 NON-RECURRENT ACUTE SEROUS OTITIS MEDIA OF RIGHT EAR: Primary | ICD-10-CM

## 2021-11-18 DIAGNOSIS — Z12.5 PROSTATE CANCER SCREENING: ICD-10-CM

## 2021-11-18 DIAGNOSIS — Z00.00 ROUTINE GENERAL MEDICAL EXAMINATION AT A HEALTH CARE FACILITY: ICD-10-CM

## 2021-11-18 DIAGNOSIS — M15.9 PRIMARY OSTEOARTHRITIS INVOLVING MULTIPLE JOINTS: ICD-10-CM

## 2021-11-18 DIAGNOSIS — E53.8 B12 DEFICIENCY: ICD-10-CM

## 2021-11-18 LAB
DEPRECATED RDW RBC AUTO: 44.3 FL (ref 37–54)
ERYTHROCYTE [DISTWIDTH] IN BLOOD BY AUTOMATED COUNT: 13.3 % (ref 12.3–15.4)
HCT VFR BLD AUTO: 45.7 % (ref 37.5–51)
HGB BLD-MCNC: 15.6 G/DL (ref 13–17.7)
MCH RBC QN AUTO: 30.9 PG (ref 26.6–33)
MCHC RBC AUTO-ENTMCNC: 34.1 G/DL (ref 31.5–35.7)
MCV RBC AUTO: 90.5 FL (ref 79–97)
PLATELET # BLD AUTO: 306 10*3/MM3 (ref 140–450)
PMV BLD AUTO: 10.8 FL (ref 6–12)
PSA SERPL-MCNC: 0.62 NG/ML (ref 0–4)
RBC # BLD AUTO: 5.05 10*6/MM3 (ref 4.14–5.8)
TSH SERPL DL<=0.05 MIU/L-ACNC: 2.45 UIU/ML (ref 0.27–4.2)
VIT B12 BLD-MCNC: 296 PG/ML (ref 211–946)
WBC NRBC COR # BLD: 12.14 10*3/MM3 (ref 3.4–10.8)

## 2021-11-18 PROCEDURE — 86431 RHEUMATOID FACTOR QUANT: CPT | Performed by: INTERNAL MEDICINE

## 2021-11-18 PROCEDURE — 84443 ASSAY THYROID STIM HORMONE: CPT | Performed by: INTERNAL MEDICINE

## 2021-11-18 PROCEDURE — 85027 COMPLETE CBC AUTOMATED: CPT | Performed by: INTERNAL MEDICINE

## 2021-11-18 PROCEDURE — G0103 PSA SCREENING: HCPCS | Performed by: INTERNAL MEDICINE

## 2021-11-18 PROCEDURE — 80053 COMPREHEN METABOLIC PANEL: CPT | Performed by: INTERNAL MEDICINE

## 2021-11-18 PROCEDURE — 80061 LIPID PANEL: CPT | Performed by: INTERNAL MEDICINE

## 2021-11-18 PROCEDURE — 99214 OFFICE O/P EST MOD 30 MIN: CPT | Performed by: INTERNAL MEDICINE

## 2021-11-18 PROCEDURE — 82607 VITAMIN B-12: CPT | Performed by: INTERNAL MEDICINE

## 2021-11-18 RX ORDER — CEFDINIR 300 MG/1
300 CAPSULE ORAL 2 TIMES DAILY
Qty: 20 CAPSULE | Refills: 0 | OUTPATIENT
Start: 2021-11-18 | End: 2021-12-04

## 2021-11-18 RX ORDER — CIPROFLOXACIN AND DEXAMETHASONE 3; 1 MG/ML; MG/ML
4 SUSPENSION/ DROPS AURICULAR (OTIC) 2 TIMES DAILY
Qty: 7.5 ML | Refills: 0 | OUTPATIENT
Start: 2021-11-18 | End: 2021-12-04

## 2021-11-18 NOTE — PROGRESS NOTES
Chief Complaint  Earache (yesterday had an right earache) and Cough (coughing up mucus kept him up all night)    Subjective          Katharine Lantigua presents to [facility]  History of Present Illness    URI symptoms - he has been coughing, which has been productive of sputum, and some chest congestion over last few days. Throat slightly sore.  Hoarseness. Also R ear pain yesterday and feels stuffy and has some ringing. He has been taking Dayquil and tylenol- helped a little. Not really any sinus congestion. No fever, no covid exposure. No SOB    Morbid obesity - we had written for it saxenda but he has not started, but plans to soon. Wt is down- trying to eat less but not able to exercise w/ his joint problems    He did see Dr Olmstead for his right knee and had fluid removed as well as another steroid shot which has helped    LBP - he saw Dr Vaughn and they are going to repeat MRI next month      Current Outpatient Medications:   •  acetaminophen (TYLENOL) 500 MG tablet, Take 500 mg by mouth Every 6 (Six) Hours As Needed for Mild Pain ., Disp: , Rfl:   •  albuterol sulfate  (90 Base) MCG/ACT inhaler, Inhale 2 puffs Every 4 (Four) Hours As Needed for Shortness of Air., Disp: 18 g, Rfl: 0  •  Diclofenac Sodium (Voltaren) 1 % gel gel, Apply 4 g topically to the appropriate area as directed 4 (Four) Times a Day As Needed (right knee pain)., Disp: 100 g, Rfl: 1  •  Eliquis 5 MG tablet tablet, TAKE 1 TABLET BY MOUTH EVERY 12 HOURS, Disp: 180 tablet, Rfl: 3  •  Fluticasone Furoate-Vilanterol (Breo Ellipta) 100-25 MCG/INH inhaler, Inhale 1 puff Daily., Disp: 1 inhaler, Rfl: 5  •  furosemide (LASIX) 40 MG tablet, TAKE 1 TABLET BY MOUTH ONCE DAILY IF NEEDED FOR SWELLING, Disp: 90 tablet, Rfl: 0  •  levothyroxine (SYNTHROID, LEVOTHROID) 100 MCG tablet, TAKE 1 TABLET BY MOUTH DAILY, Disp: 30 tablet, Rfl: 5  •  Liraglutide (SAXENDA) 18 MG/3ML injection pen, Inject 0.6mg under skin daily for week  "one, THEN 1.2mg daily for week two, THEN 1.8mg daily for week three, then 2.4mg daily for week four., Disp: 3 pen, Rfl: 0  •  losartan (COZAAR) 50 MG tablet, Take 50 mg by mouth Daily., Disp: , Rfl:   •  metoprolol tartrate (LOPRESSOR) 25 MG tablet, TAKE 1 TABLET BY MOUTH TWICE DAILY, Disp: 180 tablet, Rfl: 1  •  omeprazole (priLOSEC) 40 MG capsule, TAKE 1 CAPSULE BY MOUTH DAILY, Disp: 30 capsule, Rfl: 5  •  rosuvastatin (CRESTOR) 10 MG tablet, Take 1 tablet by mouth Daily., Disp: 30 tablet, Rfl: 11  •  spironolactone (ALDACTONE) 50 MG tablet, Take 50 mg by mouth Daily., Disp: , Rfl:   •  benzonatate (TESSALON) 200 MG capsule, Take 1 capsule by mouth 3 (Three) Times a Day As Needed for Cough., Disp: 30 capsule, Rfl: 0  •  cefdinir (OMNICEF) 300 MG capsule, Take 1 capsule by mouth 2 (Two) Times a Day., Disp: 20 capsule, Rfl: 0  •  ciprofloxacin-dexamethasone (Ciprodex) 0.3-0.1 % otic suspension, Administer 4 drops to the right ear 2 (Two) Times a Day., Disp: 7.5 mL, Rfl: 0  •  methylPREDNISolone (MEDROL) 4 MG dose pack, Take as directed on package instructions., Disp: 21 tablet, Rfl: 0  •  silodosin (RAPAFLO) 8 MG capsule capsule, 1 capsule Daily., Disp: , Rfl:   •  traMADol (ULTRAM) 50 MG tablet, Take 1 tablet by mouth Every 8 (Eight) Hours As Needed for Moderate Pain ., Disp: 20 tablet, Rfl: 2      Objective   Vital Signs:   /78 (BP Location: Right arm, Patient Position: Sitting)   Pulse 72   Temp 97.5 °F (36.4 °C) (Infrared)   Ht 186.4 cm (73.4\")   Wt (!) 144 kg (318 lb)   SpO2 99%   BMI 41.50 kg/m²       Physical exam  Constitutional: oriented to person, place, and time.  well-developed and well-nourished. No distress.   HENT:   Head: Normocephalic and atraumatic.   Ears: left - clear. Right - canal erythematous and edematous, TM erythematous  Eyes: Conjunctivae and EOM are normal.   Cardiovascular: Normal rate, regular rhythm and normal heart sounds.  Exam reveals no gallop and no friction rub.    No " murmur heard.  Pulmonary/Chest: Effort normal and breath sounds normal. No respiratory distress.   no wheezes.   Neurological:  alert and oriented to person, place, and time.   Skin: Skin is warm and dry. not diaphoretic.   Psychiatric:  normal mood and affect. behavior is normal. Judgment and thought content normal.      Result Review :                   Assessment and Plan    Diagnoses and all orders for this visit:    1. Non-recurrent acute serous otitis media of right ear (Primary)    Other orders  -     cefdinir (OMNICEF) 300 MG capsule; Take 1 capsule by mouth 2 (Two) Times a Day.  Dispense: 20 capsule; Refill: 0  -     ciprofloxacin-dexamethasone (Ciprodex) 0.3-0.1 % otic suspension; Administer 4 drops to the right ear 2 (Two) Times a Day.  Dispense: 7.5 mL; Refill: 0    Fluids/rest. Call if no better. Can continue otc's for symptom relief        Follow Up   No follow-ups on file.  Patient was given instructions and counseling regarding his condition or for health maintenance advice. Please see specific information pulled into the AVS if appropriate.     Preventative - lab order from physical is still pending and he will do today; he is fasting  He will get covid booster once he is feeling better

## 2021-11-19 LAB
ALBUMIN SERPL-MCNC: 4.2 G/DL (ref 3.5–5.2)
ALBUMIN/GLOB SERPL: 1.6 G/DL
ALP SERPL-CCNC: 70 U/L (ref 39–117)
ALT SERPL W P-5'-P-CCNC: 12 U/L (ref 1–41)
ANION GAP SERPL CALCULATED.3IONS-SCNC: 12.5 MMOL/L (ref 5–15)
AST SERPL-CCNC: 12 U/L (ref 1–40)
BILIRUB SERPL-MCNC: 0.6 MG/DL (ref 0–1.2)
BUN SERPL-MCNC: 15 MG/DL (ref 8–23)
BUN/CREAT SERPL: 16.7 (ref 7–25)
CALCIUM SPEC-SCNC: 9.3 MG/DL (ref 8.6–10.5)
CHLORIDE SERPL-SCNC: 103 MMOL/L (ref 98–107)
CHOLEST SERPL-MCNC: 154 MG/DL (ref 0–200)
CHROMATIN AB SERPL-ACNC: <10 IU/ML (ref 0–14)
CO2 SERPL-SCNC: 25.5 MMOL/L (ref 22–29)
CREAT SERPL-MCNC: 0.9 MG/DL (ref 0.76–1.27)
GFR SERPL CREATININE-BSD FRML MDRD: 86 ML/MIN/1.73
GLOBULIN UR ELPH-MCNC: 2.7 GM/DL
GLUCOSE SERPL-MCNC: 103 MG/DL (ref 65–99)
HDLC SERPL-MCNC: 66 MG/DL (ref 40–60)
LDLC SERPL CALC-MCNC: 71 MG/DL (ref 0–100)
LDLC/HDLC SERPL: 1.06 {RATIO}
POTASSIUM SERPL-SCNC: 4.4 MMOL/L (ref 3.5–5.2)
PROT SERPL-MCNC: 6.9 G/DL (ref 6–8.5)
SODIUM SERPL-SCNC: 141 MMOL/L (ref 136–145)
TRIGL SERPL-MCNC: 89 MG/DL (ref 0–150)
VLDLC SERPL-MCNC: 17 MG/DL (ref 5–40)

## 2021-12-03 ENCOUNTER — TELEPHONE (OUTPATIENT)
Dept: INTERNAL MEDICINE | Facility: CLINIC | Age: 61
End: 2021-12-03

## 2021-12-03 DIAGNOSIS — R05.9 COUGH: Primary | ICD-10-CM

## 2021-12-03 NOTE — TELEPHONE ENCOUNTER
Can you please advise as Dr. Chandler is out of the office. Looks like she saw him 11/18/2021 for this issue.

## 2021-12-03 NOTE — TELEPHONE ENCOUNTER
Caller: MOSHE     Relationship to patient: PATIENT      Best call back number: [phone]     Characteristics of symptom/severity: RIGHT EAR STOPPED UP AND CANNOT HEAR OUT OF IT,  SEVERE COUGH    How long have you been experiencing symptoms: SINCE 11-17-21      What therapies/medications have you tried: EAR DROPS AND ANTIBIOTIC   PATIENT IS OUT OF BOTH     PLEASE ADVISE     If a prescription is needed, what is your preferred pharmacy:   [id number] - [place], [place] - [address] [place] [address] AT [facility] [address] - [phone]  - [phone] [address] [address]  Phone: [phone] Fax: [phone]

## 2021-12-09 DIAGNOSIS — J40 BRONCHITIS: ICD-10-CM

## 2021-12-09 RX ORDER — DEXTROMETHORPHAN HYDROBROMIDE AND PROMETHAZINE HYDROCHLORIDE 15; 6.25 MG/5ML; MG/5ML
5 SYRUP ORAL 4 TIMES DAILY PRN
Qty: 180 ML | Refills: 0 | Status: SHIPPED | OUTPATIENT
Start: 2021-12-09 | End: 2021-12-10 | Stop reason: SDUPTHER

## 2021-12-09 RX ORDER — PREDNISONE 20 MG/1
TABLET ORAL
Qty: 15 TABLET | Refills: 0 | Status: SHIPPED | OUTPATIENT
Start: 2021-12-09 | End: 2021-12-10 | Stop reason: SDUPTHER

## 2021-12-09 NOTE — TELEPHONE ENCOUNTER
PN and he will do another steroid pack and he would like to have the cough medicine also.  If you put an order in for a covid test he will be by tomorrow to get that done.

## 2021-12-09 NOTE — TELEPHONE ENCOUNTER
If the steroid helped we could do a another course of that. If no better, I would recommend coming back in here ir [facility] to get rechecked. ALso, if he has not been covid tested would recommend doing that oo just to be on safe side

## 2021-12-09 NOTE — TELEPHONE ENCOUNTER
Patient stated he was seen at  on 12/04 and was given a steroid and a cough syrup. Patient is still having a severe cough and is out of medication. Patient would like to know if he should get a refill of these medications or if he needs an appt to be re-evaluated by      Patient call back: [phone]

## 2021-12-10 ENCOUNTER — LAB (OUTPATIENT)
Dept: LAB | Facility: HOSPITAL | Age: 61
End: 2021-12-10

## 2021-12-10 DIAGNOSIS — J40 BRONCHITIS: ICD-10-CM

## 2021-12-10 DIAGNOSIS — R05.9 COUGH: ICD-10-CM

## 2021-12-10 PROCEDURE — U0003 INFECTIOUS AGENT DETECTION BY NUCLEIC ACID (DNA OR RNA); SEVERE ACUTE RESPIRATORY SYNDROME CORONAVIRUS 2 (SARS-COV-2) (CORONAVIRUS DISEASE [COVID-19]), AMPLIFIED PROBE TECHNIQUE, MAKING USE OF HIGH THROUGHPUT TECHNOLOGIES AS DESCRIBED BY CMS-2020-01-R: HCPCS | Performed by: INTERNAL MEDICINE

## 2021-12-10 RX ORDER — DEXTROMETHORPHAN HYDROBROMIDE AND PROMETHAZINE HYDROCHLORIDE 15; 6.25 MG/5ML; MG/5ML
5 SYRUP ORAL 4 TIMES DAILY PRN
Qty: 180 ML | Refills: 0 | Status: SHIPPED | OUTPATIENT
Start: 2021-12-10 | End: 2022-01-12

## 2021-12-10 RX ORDER — PREDNISONE 20 MG/1
TABLET ORAL
Qty: 15 TABLET | Refills: 0 | Status: SHIPPED | OUTPATIENT
Start: 2021-12-10 | End: 2022-01-12

## 2021-12-10 NOTE — TELEPHONE ENCOUNTER
Informed him of medications being sent to pharmacy. He verbalized understanding and wanted to go ahead and schedule an appointment to follow up with Dr. Chandler.

## 2021-12-10 NOTE — TELEPHONE ENCOUNTER
Dr. Chandler, it looks like she wanted prescriptions sent to [facility] not [facility]. Can you resend?

## 2021-12-12 LAB
LABCORP SARS-COV-2, NAA 2 DAY TAT: NORMAL
SARS-COV-2 RNA RESP QL NAA+PROBE: NOT DETECTED

## 2021-12-13 ENCOUNTER — TELEPHONE (OUTPATIENT)
Dept: INTERNAL MEDICINE | Facility: CLINIC | Age: 61
End: 2021-12-13

## 2021-12-13 NOTE — TELEPHONE ENCOUNTER
PN and he states he is a little better.  If he gets worse will call to make an appointment.  He is sorry he missed the appointment today.

## 2021-12-13 NOTE — TELEPHONE ENCOUNTER
----- Message from Gail Chandler MD sent at 12/13/2021  1:22 PM EST -----  Regarding: Covid test  Can you let him know the Covid test was negative.  If symptoms aren't improving, wouldcome back in for an appointment this week

## 2022-01-12 PROCEDURE — U0004 COV-19 TEST NON-CDC HGH THRU: HCPCS | Performed by: PERSONAL EMERGENCY RESPONSE ATTENDANT

## 2022-05-16 PROBLEM — E78.00 PURE HYPERCHOLESTEROLEMIA: Chronic | Status: ACTIVE | Noted: 2019-07-10

## 2022-05-16 RX ORDER — LEVOTHYROXINE SODIUM 0.1 MG/1
100 TABLET ORAL DAILY
Qty: 30 TABLET | Refills: 0 | Status: SHIPPED | OUTPATIENT
Start: 2022-05-16 | End: 2022-06-07 | Stop reason: SDUPTHER

## 2022-05-16 RX ORDER — OMEPRAZOLE 40 MG/1
CAPSULE, DELAYED RELEASE ORAL
Qty: 30 CAPSULE | Refills: 0 | Status: SHIPPED | OUTPATIENT
Start: 2022-05-16 | End: 2022-06-06 | Stop reason: SDUPTHER

## 2022-05-16 NOTE — TELEPHONE ENCOUNTER
LV: 11/18/21  NV:  10/24/22 does he need an appointment before this date?  LF: 11/16/21 on both 30/5  LL: 11/18/21

## 2022-05-16 NOTE — TELEPHONE ENCOUNTER
Patient has been notified and appointment has been made for 6/6/22.  He wants to get the labs before his appointment.  Can you enter his order.

## 2022-06-06 ENCOUNTER — OFFICE VISIT (OUTPATIENT)
Dept: INTERNAL MEDICINE | Facility: CLINIC | Age: 62
End: 2022-06-06

## 2022-06-06 ENCOUNTER — LAB (OUTPATIENT)
Dept: LAB | Facility: HOSPITAL | Age: 62
End: 2022-06-06

## 2022-06-06 VITALS
HEART RATE: 67 BPM | DIASTOLIC BLOOD PRESSURE: 68 MMHG | BODY MASS INDEX: 41.75 KG/M2 | SYSTOLIC BLOOD PRESSURE: 122 MMHG | OXYGEN SATURATION: 97 % | TEMPERATURE: 97.3 F | WEIGHT: 315 LBS | HEIGHT: 73 IN

## 2022-06-06 DIAGNOSIS — E53.8 B12 DEFICIENCY: ICD-10-CM

## 2022-06-06 DIAGNOSIS — E03.9 ACQUIRED HYPOTHYROIDISM: Primary | Chronic | ICD-10-CM

## 2022-06-06 DIAGNOSIS — E66.01 MORBID (SEVERE) OBESITY DUE TO EXCESS CALORIES: ICD-10-CM

## 2022-06-06 DIAGNOSIS — E78.00 PURE HYPERCHOLESTEROLEMIA: Chronic | ICD-10-CM

## 2022-06-06 DIAGNOSIS — I10 ESSENTIAL HYPERTENSION: Chronic | ICD-10-CM

## 2022-06-06 DIAGNOSIS — I48.0 PAROXYSMAL ATRIAL FIBRILLATION: ICD-10-CM

## 2022-06-06 DIAGNOSIS — H65.01 NON-RECURRENT ACUTE SEROUS OTITIS MEDIA OF RIGHT EAR: ICD-10-CM

## 2022-06-06 DIAGNOSIS — E03.9 ACQUIRED HYPOTHYROIDISM: Chronic | ICD-10-CM

## 2022-06-06 LAB
CHOLEST SERPL-MCNC: 126 MG/DL (ref 0–200)
DEPRECATED RDW RBC AUTO: 46.7 FL (ref 37–54)
ERYTHROCYTE [DISTWIDTH] IN BLOOD BY AUTOMATED COUNT: 14.4 % (ref 12.3–15.4)
HCT VFR BLD AUTO: 40.7 % (ref 37.5–51)
HDLC SERPL-MCNC: 58 MG/DL (ref 40–60)
HGB BLD-MCNC: 13.6 G/DL (ref 13–17.7)
LDLC SERPL CALC-MCNC: 48 MG/DL (ref 0–100)
LDLC/HDLC SERPL: 0.8 {RATIO}
MCH RBC QN AUTO: 29.8 PG (ref 26.6–33)
MCHC RBC AUTO-ENTMCNC: 33.4 G/DL (ref 31.5–35.7)
MCV RBC AUTO: 89.1 FL (ref 79–97)
PLATELET # BLD AUTO: 352 10*3/MM3 (ref 140–450)
PMV BLD AUTO: 10 FL (ref 6–12)
RBC # BLD AUTO: 4.57 10*6/MM3 (ref 4.14–5.8)
TRIGL SERPL-MCNC: 109 MG/DL (ref 0–150)
VLDLC SERPL-MCNC: 20 MG/DL (ref 5–40)
WBC NRBC COR # BLD: 8.52 10*3/MM3 (ref 3.4–10.8)

## 2022-06-06 PROCEDURE — 99214 OFFICE O/P EST MOD 30 MIN: CPT | Performed by: INTERNAL MEDICINE

## 2022-06-06 PROCEDURE — 84443 ASSAY THYROID STIM HORMONE: CPT | Performed by: INTERNAL MEDICINE

## 2022-06-06 PROCEDURE — 82607 VITAMIN B-12: CPT | Performed by: INTERNAL MEDICINE

## 2022-06-06 PROCEDURE — 80053 COMPREHEN METABOLIC PANEL: CPT | Performed by: INTERNAL MEDICINE

## 2022-06-06 PROCEDURE — 80061 LIPID PANEL: CPT | Performed by: INTERNAL MEDICINE

## 2022-06-06 PROCEDURE — 85027 COMPLETE CBC AUTOMATED: CPT | Performed by: INTERNAL MEDICINE

## 2022-06-06 RX ORDER — LEVOTHYROXINE SODIUM 0.1 MG/1
100 TABLET ORAL DAILY
Qty: 30 TABLET | Refills: 0 | Status: CANCELLED | OUTPATIENT
Start: 2022-06-06

## 2022-06-06 RX ORDER — ACETAMINOPHEN 500 MG
1000 TABLET ORAL 2 TIMES DAILY
COMMUNITY

## 2022-06-06 RX ORDER — OXYBUTYNIN CHLORIDE 10 MG/1
10 TABLET, EXTENDED RELEASE ORAL DAILY
COMMUNITY
End: 2023-01-25

## 2022-06-06 RX ORDER — MELOXICAM 15 MG/1
15 TABLET ORAL DAILY
COMMUNITY
Start: 2022-05-16 | End: 2022-07-18

## 2022-06-06 RX ORDER — CEFDINIR 300 MG/1
300 CAPSULE ORAL 2 TIMES DAILY
Qty: 14 CAPSULE | Refills: 0 | Status: SHIPPED | OUTPATIENT
Start: 2022-06-06 | End: 2022-07-08

## 2022-06-06 RX ORDER — OMEPRAZOLE 40 MG/1
40 CAPSULE, DELAYED RELEASE ORAL DAILY
Qty: 30 CAPSULE | Refills: 11 | Status: SHIPPED | OUTPATIENT
Start: 2022-06-06 | End: 2023-01-25

## 2022-06-07 LAB
ALBUMIN SERPL-MCNC: 3.9 G/DL (ref 3.5–5.2)
ALBUMIN/GLOB SERPL: 1.5 G/DL
ALP SERPL-CCNC: 84 U/L (ref 39–117)
ALT SERPL W P-5'-P-CCNC: 16 U/L (ref 1–41)
ANION GAP SERPL CALCULATED.3IONS-SCNC: 9.8 MMOL/L (ref 5–15)
AST SERPL-CCNC: 13 U/L (ref 1–40)
BILIRUB SERPL-MCNC: 0.5 MG/DL (ref 0–1.2)
BUN SERPL-MCNC: 13 MG/DL (ref 8–23)
BUN/CREAT SERPL: 16.7 (ref 7–25)
CALCIUM SPEC-SCNC: 9.2 MG/DL (ref 8.6–10.5)
CHLORIDE SERPL-SCNC: 100 MMOL/L (ref 98–107)
CO2 SERPL-SCNC: 26.2 MMOL/L (ref 22–29)
CREAT SERPL-MCNC: 0.78 MG/DL (ref 0.76–1.27)
EGFRCR SERPLBLD CKD-EPI 2021: 100.2 ML/MIN/1.73
GLOBULIN UR ELPH-MCNC: 2.6 GM/DL
GLUCOSE SERPL-MCNC: 97 MG/DL (ref 65–99)
POTASSIUM SERPL-SCNC: 5 MMOL/L (ref 3.5–5.2)
PROT SERPL-MCNC: 6.5 G/DL (ref 6–8.5)
SODIUM SERPL-SCNC: 136 MMOL/L (ref 136–145)
TSH SERPL DL<=0.05 MIU/L-ACNC: 2.85 UIU/ML (ref 0.27–4.2)
VIT B12 BLD-MCNC: 323 PG/ML (ref 211–946)

## 2022-06-07 RX ORDER — LEVOTHYROXINE SODIUM 0.1 MG/1
100 TABLET ORAL DAILY
Qty: 30 TABLET | Refills: 11 | Status: SHIPPED | OUTPATIENT
Start: 2022-06-07

## 2022-06-13 RX ORDER — LEVOTHYROXINE SODIUM 0.1 MG/1
100 TABLET ORAL DAILY
Qty: 30 TABLET | Refills: 11 | OUTPATIENT
Start: 2022-06-13

## 2022-06-16 RX ORDER — ROSUVASTATIN CALCIUM 10 MG/1
10 TABLET, COATED ORAL DAILY
Qty: 90 TABLET | Refills: 3 | Status: SHIPPED | OUTPATIENT
Start: 2022-06-16

## 2022-07-07 ENCOUNTER — TELEPHONE (OUTPATIENT)
Dept: INTERNAL MEDICINE | Facility: CLINIC | Age: 62
End: 2022-07-07

## 2022-07-07 NOTE — TELEPHONE ENCOUNTER
Caller: Katharine Lantigua    Relationship: Self    Best call back number:    [phone]    When did you start taking these medications:     PATIENT STATED THE LAST VISIT HE HAD WITH DR KNAPP HE WAS PRESCRIBED AN ANTIBIOTIC FOR FLUID BUILD UP AND PAIN IN HIS RIGHT EAR    PATIENT STATED THE PAIN AND FLUID NEVER COMPLETELY CLEARED AND IS NOW WORSE     PATIENT REQUESTED A CALL BACK FROM DR KNAPP TO RECOMMEND NEXT STEPS     Who prescribed you this medication:     DR KNAPP

## 2022-07-07 NOTE — TELEPHONE ENCOUNTER
He was seen on June 6th and was given cefdinir.  Do you want him to come back in or a different medication?

## 2022-07-08 ENCOUNTER — OFFICE VISIT (OUTPATIENT)
Dept: INTERNAL MEDICINE | Facility: CLINIC | Age: 62
End: 2022-07-08

## 2022-07-08 VITALS
HEART RATE: 75 BPM | BODY MASS INDEX: 41.75 KG/M2 | WEIGHT: 315 LBS | HEIGHT: 73 IN | SYSTOLIC BLOOD PRESSURE: 136 MMHG | RESPIRATION RATE: 20 BRPM | TEMPERATURE: 97.9 F | DIASTOLIC BLOOD PRESSURE: 90 MMHG | OXYGEN SATURATION: 98 %

## 2022-07-08 DIAGNOSIS — H65.491 CHRONIC MIDDLE EAR EFFUSION, RIGHT: ICD-10-CM

## 2022-07-08 DIAGNOSIS — H92.01 RIGHT EAR PAIN: Primary | ICD-10-CM

## 2022-07-08 DIAGNOSIS — H91.91 HEARING LOSS OF RIGHT EAR, UNSPECIFIED HEARING LOSS TYPE: ICD-10-CM

## 2022-07-08 DIAGNOSIS — J30.9 ALLERGIC RHINITIS, UNSPECIFIED SEASONALITY, UNSPECIFIED TRIGGER: ICD-10-CM

## 2022-07-08 PROCEDURE — 99213 OFFICE O/P EST LOW 20 MIN: CPT | Performed by: PHYSICIAN ASSISTANT

## 2022-07-08 RX ORDER — PREDNISONE 10 MG/1
30 TABLET ORAL DAILY
Qty: 15 TABLET | Refills: 0 | Status: SHIPPED | OUTPATIENT
Start: 2022-07-08 | End: 2022-07-18

## 2022-07-08 RX ORDER — LEVOCETIRIZINE DIHYDROCHLORIDE 5 MG/1
5 TABLET, FILM COATED ORAL EVERY EVENING
Qty: 30 TABLET | Refills: 2 | Status: SHIPPED | OUTPATIENT
Start: 2022-07-08 | End: 2022-07-18

## 2022-07-08 RX ORDER — FLUTICASONE PROPIONATE 50 MCG
2 SPRAY, SUSPENSION (ML) NASAL DAILY
Qty: 16 G | Refills: 2 | Status: SHIPPED | OUTPATIENT
Start: 2022-07-08 | End: 2022-07-18

## 2022-07-08 NOTE — PROGRESS NOTES
Chief Complaint  Earache (Right ear. Saw Dr. Chandler on 6/6/2022 and states there was fluid behind ear. )    Subjective          History of Present Illness  Katharine Lantigua presents to [facility] for   Ear Fullness:  Was started on omnicef a month ago for ear infection, it may have helped for a few days but his ear only feels worse now. No recent allergy sx. Has had trouble hearing out of that ear for a while now, several months. He got an ear infection with sinus infection back in Nov and was tx with abx, he thinks he has had a hard time hearing out of the ear since then. Has not had a fever, no ear d/c.       Review of Systems   Constitutional: Negative for fever and unexpected weight loss.   HENT: Positive for ear pain. Negative for congestion, postnasal drip, sinus pressure, sore throat and swollen glands.    Respiratory: Negative for cough, shortness of breath and wheezing.    Cardiovascular: Negative for chest pain and palpitations.       The following portions of the patient's history were reviewed and updated as appropriate: allergies, current medications, past family history, past medical history, past social history, past surgical history and problem list.  No Known Allergies  Current Outpatient Medications on File Prior to Visit   Medication Sig Dispense Refill   • acetaminophen (TYLENOL) 500 MG tablet Take 1,000 mg by mouth 2 (Two) Times a Day.     • Eliquis 5 MG tablet tablet TAKE 1 TABLET BY MOUTH EVERY 12 HOURS 180 tablet 3   • levothyroxine (SYNTHROID, LEVOTHROID) 100 MCG tablet Take 1 tablet by mouth Daily. 30 tablet 11   • Liraglutide (SAXENDA) 18 MG/3ML injection pen Inject 0.6mg under skin daily for week one, THEN 1.2mg daily for week two, THEN 1.8mg daily for week three, then 2.4mg daily for week four. 3 pen 0   • losartan (COZAAR) 50 MG tablet Take 50 mg by mouth Daily.     • metoprolol tartrate (LOPRESSOR) 25 MG tablet TAKE 1 TABLET BY MOUTH TWICE DAILY 180 tablet 1    • omeprazole (priLOSEC) 40 MG capsule Take 1 capsule by mouth Daily. 30 capsule 11   • oxybutynin XL (DITROPAN-XL) 10 MG 24 hr tablet 10 mg Daily.     • rosuvastatin (CRESTOR) 10 MG tablet Take 1 tablet by mouth Daily. 90 tablet 3   • spironolactone (ALDACTONE) 50 MG tablet Take 50 mg by mouth Daily.     • traMADol (ULTRAM) 50 MG tablet Take 1 tablet by mouth Every 8 (Eight) Hours As Needed for Moderate Pain . 20 tablet 2   • albuterol sulfate  (90 Base) MCG/ACT inhaler Inhale 2 puffs Every 4 (Four) Hours As Needed for Shortness of Air. 18 g 0   • Diclofenac Sodium (Voltaren) 1 % gel gel Apply 4 g topically to the appropriate area as directed 4 (Four) Times a Day As Needed (right knee pain). 100 g 1   • Fluticasone Furoate-Vilanterol (Breo Ellipta) 100-25 MCG/INH inhaler Inhale 1 puff Daily. 1 inhaler 5   • furosemide (LASIX) 40 MG tablet TAKE 1 TABLET BY MOUTH ONCE DAILY IF NEEDED FOR SWELLING 90 tablet 0   • meloxicam (MOBIC) 15 MG tablet Take 15 mg by mouth Daily.     • silodosin (RAPAFLO) 8 MG capsule capsule 1 capsule Daily.     • [DISCONTINUED] cefdinir (OMNICEF) 300 MG capsule Take 1 capsule by mouth 2 (Two) Times a Day. 14 capsule 0     No current facility-administered medications on file prior to visit.     New Medications Ordered This Visit   Medications   • fluticasone (Flonase) 50 MCG/ACT nasal spray     Si sprays into the nostril(s) as directed by provider Daily.     Dispense:  16 g     Refill:  2   • levocetirizine (XYZAL) 5 MG tablet     Sig: Take 1 tablet by mouth Every Evening.     Dispense:  30 tablet     Refill:  2   • predniSONE (DELTASONE) 10 MG tablet     Sig: Take 3 tablets by mouth Daily.     Dispense:  15 tablet     Refill:  0     Social History     Tobacco Use   Smoking Status Never Smoker   Smokeless Tobacco Never Used        Objective   Vital Signs:   Vitals:    22 1546   BP: 136/90   Pulse: 75   Resp: 20   Temp: 97.9 °F (36.6 °C)   TempSrc: Temporal   SpO2: 98%  "  Weight: (!) 152 kg (334 lb)   Height: 186.4 cm (73.4\")   PainSc:   4   PainLoc: Ear      Physical Exam  Vitals reviewed.   Constitutional:       General: He is not in acute distress.     Appearance: Normal appearance.   HENT:      Head: Normocephalic and atraumatic.      Right Ear: Ear canal and external ear normal. There is no impacted cerumen.      Left Ear: Ear canal and external ear normal. There is no impacted cerumen.      Ears:      Comments: Clear Tms bulging bilat, no eryth  Eyes:      General: No scleral icterus.     Extraocular Movements: Extraocular movements intact.      Conjunctiva/sclera: Conjunctivae normal.   Cardiovascular:      Rate and Rhythm: Normal rate and regular rhythm.   Pulmonary:      Effort: Pulmonary effort is normal. No respiratory distress.      Breath sounds: Normal breath sounds.   Musculoskeletal:      Cervical back: Normal range of motion and neck supple. No tenderness.   Lymphadenopathy:      Cervical: No cervical adenopathy.   Skin:     General: Skin is warm and dry.      Coloration: Skin is not jaundiced.   Neurological:      General: No focal deficit present.      Mental Status: He is alert and oriented to person, place, and time.      Gait: Gait normal.   Psychiatric:         Mood and Affect: Mood normal.         Behavior: Behavior normal.        No LMP for male patient.    Result Review :                   Assessment and Plan    Diagnoses and all orders for this visit:    1. Right ear pain (Primary)  Assessment & Plan:  Likely related to chronic effusion, no infection noted today, refer to ENT. tx w/steroids and allergy meds    Orders:  -     Ambulatory Referral to ENT (Otolaryngology)    2. Hearing loss of right ear, unspecified hearing loss type  -     Ambulatory Referral to ENT (Otolaryngology)    3. Chronic middle ear effusion, right  Assessment & Plan:  Refer to ENT, treat with steroids and allergy meds    Orders:  -     fluticasone (Flonase) 50 MCG/ACT nasal spray; 2 " sprays into the nostril(s) as directed by provider Daily.  Dispense: 16 g; Refill: 2  -     levocetirizine (XYZAL) 5 MG tablet; Take 1 tablet by mouth Every Evening.  Dispense: 30 tablet; Refill: 2  -     predniSONE (DELTASONE) 10 MG tablet; Take 3 tablets by mouth Daily.  Dispense: 15 tablet; Refill: 0  -     Ambulatory Referral to ENT (Otolaryngology)    4. Allergic rhinitis, unspecified seasonality, unspecified trigger  -     fluticasone (Flonase) 50 MCG/ACT nasal spray; 2 sprays into the nostril(s) as directed by provider Daily.  Dispense: 16 g; Refill: 2  -     levocetirizine (XYZAL) 5 MG tablet; Take 1 tablet by mouth Every Evening.  Dispense: 30 tablet; Refill: 2  -     predniSONE (DELTASONE) 10 MG tablet; Take 3 tablets by mouth Daily.  Dispense: 15 tablet; Refill: 0      Follow Up   Return if symptoms worsen or fail to improve.    Follow up if symptoms worsen or persist or has new or concerning symptoms, go to ER for severe symptoms.   Reviewed common medication effects and side effects and advised to report side effects immediately.  Encouraged medication compliance and the importance of keeping scheduled follow up appointments with me and any other providers.  If a referral was made please contact our office if you have not heard about an appointment in the next 2 weeks.   If labs or images are ordered we will contact you with the results within the next week.  If you have not heard from us after a week please call our office to inquire about the results.   Patient was given instructions and counseling regarding his condition or for health maintenance advice. Please see specific information pulled into the AVS if appropriate.     Caroline Emery PA-C    * Please note that portions of this note were completed with a voice recognition program.

## 2022-07-08 NOTE — ASSESSMENT & PLAN NOTE
Likely related to chronic effusion, no infection noted today, refer to ENT. tx w/steroids and allergy meds

## 2022-07-11 ENCOUNTER — TELEPHONE (OUTPATIENT)
Dept: INTERNAL MEDICINE | Facility: CLINIC | Age: 62
End: 2022-07-11

## 2022-07-11 DIAGNOSIS — R05.9 COUGH: Primary | ICD-10-CM

## 2022-07-11 RX ORDER — DOXYCYCLINE 100 MG/1
100 TABLET ORAL 2 TIMES DAILY
Qty: 14 TABLET | Refills: 0 | Status: SHIPPED | OUTPATIENT
Start: 2022-07-11 | End: 2022-07-18

## 2022-07-11 RX ORDER — BENZONATATE 100 MG/1
100-200 CAPSULE ORAL 3 TIMES DAILY PRN
Qty: 40 CAPSULE | Refills: 0 | Status: SHIPPED | OUTPATIENT
Start: 2022-07-11 | End: 2023-01-25

## 2022-07-11 NOTE — TELEPHONE ENCOUNTER
Patient called, stated he developed a new issue which is pretty acute, did not give anymore details, and would like to speak to clinical about it, requested Rima. Please advise.

## 2022-07-11 NOTE — TELEPHONE ENCOUNTER
Patient only wanted to do the rapid covid test.  He wants to know if Caroline is going to send in something for his cough.  Rapid covid test was negative.  I put in an order the the covid only.

## 2022-07-11 NOTE — TELEPHONE ENCOUNTER
Patient had a minor cough when he saw Caroline.  He states he cough is getting worse and has shortness of breath at night. He is coughing up mucus but cannot get it all the way out.  Please advise.  Can you order him a covid test?

## 2022-07-11 NOTE — TELEPHONE ENCOUNTER
Will send in abx and tessalon perles, f/u if sx do not improve over the next few days or sx worsen.

## 2022-07-11 NOTE — TELEPHONE ENCOUNTER
Yes, I have added a covid test, he can come in and get that any time as a nurse visit, I ordered both the rapid and PCR, if rapid is negative he can do PCR if he wants.

## 2022-07-12 ENCOUNTER — TELEPHONE (OUTPATIENT)
Dept: INTERNAL MEDICINE | Facility: CLINIC | Age: 62
End: 2022-07-12

## 2022-07-18 ENCOUNTER — OFFICE VISIT (OUTPATIENT)
Dept: INTERNAL MEDICINE | Facility: CLINIC | Age: 62
End: 2022-07-18

## 2022-07-18 VITALS
HEART RATE: 79 BPM | HEIGHT: 73 IN | OXYGEN SATURATION: 95 % | SYSTOLIC BLOOD PRESSURE: 132 MMHG | BODY MASS INDEX: 41.75 KG/M2 | WEIGHT: 315 LBS | DIASTOLIC BLOOD PRESSURE: 90 MMHG | TEMPERATURE: 98.2 F

## 2022-07-18 DIAGNOSIS — M79.89 LEG SWELLING: Primary | ICD-10-CM

## 2022-07-18 DIAGNOSIS — E66.01 CLASS 3 SEVERE OBESITY DUE TO EXCESS CALORIES WITH SERIOUS COMORBIDITY AND BODY MASS INDEX (BMI) OF 40.0 TO 44.9 IN ADULT: ICD-10-CM

## 2022-07-18 DIAGNOSIS — R60.0 BILATERAL LOWER EXTREMITY EDEMA: Chronic | ICD-10-CM

## 2022-07-18 PROCEDURE — 99215 OFFICE O/P EST HI 40 MIN: CPT | Performed by: FAMILY MEDICINE

## 2022-07-18 RX ORDER — GUAIFENESIN AND DEXTROMETHORPHAN HYDROBROMIDE 1200; 60 MG/1; MG/1
TABLET, EXTENDED RELEASE ORAL
COMMUNITY
End: 2023-01-25

## 2022-07-18 RX ORDER — FUROSEMIDE 40 MG/1
TABLET ORAL
Qty: 90 TABLET | Refills: 0 | Status: SHIPPED | OUTPATIENT
Start: 2022-07-18 | End: 2022-10-04

## 2022-07-18 RX ORDER — CLARITHROMYCIN 500 MG/1
TABLET, COATED ORAL EVERY 12 HOURS SCHEDULED
COMMUNITY
End: 2023-01-25

## 2022-07-18 NOTE — PROGRESS NOTES
"Alexis Lantigua is a 62 y.o. male.     Chief Complaint   Patient presents with   • Foot Swelling     Left worse than right. Shortness of breath, wheezing, cough, HTN.  Concerns of CHF.  Had old script for Lasix from 2020 (Took a dose last night and another this morning   • Hypertension       Visit Vitals  /90   Pulse 79   Temp 98.2 °F (36.8 °C)   Ht 186.4 cm (73.4\")   Wt (!) 152 kg (335 lb)   SpO2 95%   BMI 43.72 kg/m²       Wt Readings from Last 3 Encounters:   07/18/22 (!) 152 kg (335 lb)   07/08/22 (!) 152 kg (334 lb)   06/06/22 (!) 150 kg (331 lb 9.6 oz)       Leg Swelling  This is a chronic problem. The current episode started more than 1 year ago. The problem occurs constantly. The problem has been gradually worsening. Associated symptoms include arthralgias. Pertinent negatives include no abdominal pain, anorexia, change in bowel habit, chest pain, chills, congestion, coughing, diaphoresis, fatigue, fever, headaches, joint swelling, myalgias, nausea, neck pain, numbness, rash, sore throat, swollen glands, urinary symptoms, vertigo, visual change, vomiting or weakness. Exacerbated by: salt. Treatments tried: lasix. The treatment provided mild relief.   Hypertension  This is a chronic problem. The current episode started more than 1 year ago. The problem has been gradually worsening since onset. The problem is uncontrolled. Associated symptoms include peripheral edema. Pertinent negatives include no anxiety, blurred vision, chest pain, headaches, malaise/fatigue, neck pain, orthopnea, palpitations, PND, shortness of breath or sweats. There are no associated agents to hypertension. Risk factors for coronary artery disease include dyslipidemia, male gender, obesity and family history. Past treatments include beta blockers, angiotensin blockers, diuretics and alpha 1 blockers. Current antihypertension treatment includes beta blockers, diuretics and angiotensin blockers. The current treatment " provides moderate improvement. Identifiable causes of hypertension include sleep apnea. There is no history of a thyroid problem.      Pt here to establish care.  Pt has had cough that is severe and shortness of breath, wheezing since early this month.   Pt does salt his food. Pt has previously had hip surgery and lost weight.  Pt has gained 80# pounds back of the 70# that he lost.  Pt is eating out most nights.     Pt has had swollen feet the past 3-4 days.     Pt saw ENT this am and BP was 148/100.     Pt took lasix last night and this morning and has had increase in urine.  Pt has not had Atrial fibrillation in a few yrs.   Pt cannot walk very much after hip surgery. Pt has herniated disc in back.  Pt was water walking with last weight loss.   Pt needs surgery on right shoulder.   The following portions of the patient's history were reviewed and updated as appropriate: allergies, current medications, past family history, past medical history, past social history, past surgical history and problem list.    Past Medical History:   Diagnosis Date   • Abnormal adenosine sestamibi study    • Anxiety    • BCC (basal cell carcinoma of skin) 2018    nose   • Depression     Dr. Peterson   • Echocardiogram abnormal 04/26/2021    Dr Melgar - EF 60%, mild LVH, mild MR, TR, moderately dialted LA   • Enlargement of abdominal aorta (HCC) 2021    Dr. Melgar   • Fatty liver 2016    by CT and US   • H/O cardiovascular stress test 04/2010    negative   • H/O colonoscopy 12/2014    Dr. Olivier- neg. repeat in 3-5 yrs, previous h/o adenomatous polyp '12 & '13   • Heart disease    • History of diagnostic ultrasound 04/2016    fatty liver, multiple hypodense lesions in liver, GB nml, CT liver recommended   • Hyperlipidemia    • Hypertension    • Hypothyroidism    • Knee pain, right    • Multiple hemangiomas 05/01/2016    4 hemangiomas in liver - liver protocol CT at . 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 - Dr. Amin   • Osteoarthritis    • Paroxysmal atrial fibrillation (HCC) 2015   • Screening PSA (prostate specific antigen) 2016    0.4; 2/15 0.4;  0.4   • Skin cancer of face       Past Surgical History:   Procedure Laterality Date   • ANAL FISTULA REPAIR       &    • CARDIAC ABLATION     • COLONOSCOPY     • CYSTOSCOPY  2022    [facility]   • JOINT REPLACEMENT Right    • MOHS SURGERY Left     left ala - BCC   • SEPTOPLASTY     • ARCENIO  2015    ARCENIO/external cardioversion into normal sinus rhythm with initiation of flecainide, 2015.  EF 55% to 60%.   • TONSILLECTOMY     • TOTAL HIP ARTHROPLASTY Right     Right total hip replacement. (h/o childhood fracture)   • TOTAL HIP ARTHROPLASTY Left 2019    Dr Allen   • VENOUS ABLATION      Status post cryoablation of the pulmonary veins, 2015, with maintenance of sinus rhythm on no antiarrhythmic.      Family History   Problem Relation Age of Onset   • Hypothyroidism Mother          in 80's   • Cancer Mother    • Stroke Mother    • Cancer Father         lung, smoker   • Diabetes type II Father    • Hypertension Father    • Coronary artery disease Father         60;s   • Deep vein thrombosis Father    • Obesity Other    • Coronary artery disease Other       Social History     Socioeconomic History   • Marital status:    • Number of children: 2   Tobacco Use   • Smoking status: Never Smoker   • Smokeless tobacco: Never Used   Vaping Use   • Vaping Use: Never used   Substance and Sexual Activity   • Alcohol use: Yes     Alcohol/week: 2.0 standard drinks     Types: 2 Glasses of wine per week     Comment: 3 X weekly    • Drug use: No   • Sexual activity: Defer      No Known Allergies    Review of Systems   Constitutional: Negative for chills, diaphoresis, fatigue, fever and malaise/fatigue.   HENT: Negative for  congestion and sore throat.    Eyes: Negative for blurred vision.   Respiratory: Negative for cough and shortness of breath.    Cardiovascular: Negative for chest pain, palpitations, orthopnea and PND.   Gastrointestinal: Negative for abdominal pain, anorexia, change in bowel habit, nausea and vomiting.   Musculoskeletal: Positive for arthralgias. Negative for joint swelling, myalgias and neck pain.   Skin: Negative for rash.   Neurological: Negative for vertigo, weakness, numbness and headaches.       Objective   Physical Exam  Vitals and nursing note reviewed.   Constitutional:       Appearance: He is well-developed.   HENT:      Head: Normocephalic.      Right Ear: External ear normal.      Left Ear: External ear normal.      Nose: Nose normal.   Eyes:      General: Lids are normal.      Conjunctiva/sclera: Conjunctivae normal.      Pupils: Pupils are equal, round, and reactive to light.   Neck:      Thyroid: No thyroid mass or thyromegaly.      Vascular: No carotid bruit.      Trachea: Trachea normal.   Cardiovascular:      Rate and Rhythm: Normal rate and regular rhythm.      Heart sounds: No murmur heard.  Pulmonary:      Effort: Pulmonary effort is normal. No respiratory distress.      Breath sounds: Normal breath sounds. No decreased breath sounds, wheezing, rhonchi or rales.   Chest:      Chest wall: No tenderness.   Abdominal:      General: Bowel sounds are normal.      Palpations: Abdomen is soft.      Tenderness: There is no abdominal tenderness.   Musculoskeletal:         General: Normal range of motion.      Cervical back: Normal range of motion and neck supple.      Right lower le+ Pitting Edema present.      Left lower le+ Pitting Edema present.   Skin:     General: Skin is warm and dry.   Neurological:      Mental Status: He is alert and oriented to person, place, and time.   Psychiatric:         Behavior: Behavior normal.         Assessment & Plan   Diagnoses and all orders for this  visit:    1. Leg swelling (Primary)  -     furosemide (LASIX) 40 MG tablet; 1 daily prn fluid retention  Dispense: 90 tablet; Refill: 0    2. Bilateral lower extremity edema  -     furosemide (LASIX) 40 MG tablet; 1 daily prn fluid retention  Dispense: 90 tablet; Refill: 0    3. Class 3 severe obesity due to excess calories with serious comorbidity and body mass index (BMI) of 40.0 to 44.9 in adult (HCC)  -     Ambulatory Referral to Nutrition Services      Patient advised that shortness of breath should be improving over this next week or he needs to be seen again.  Patient advised to reclined in his chair several times during the day to get his feet up to help with mobilization of fluid.    Patient has had to go to the bathroom several times today to get rid of the excess fluid.    Over half the time spent counseling patient on dietary changes, exercise changes and salt reduction, as well as ways to prep food ahead.    Patient is planning to get back in the pool to try and exercise without aggravating his knee or his hip.         Current Outpatient Medications:   •  acetaminophen (TYLENOL) 500 MG tablet, Take 1,000 mg by mouth 2 (Two) Times a Day., Disp: , Rfl:   •  albuterol sulfate  (90 Base) MCG/ACT inhaler, Inhale 2 puffs Every 4 (Four) Hours As Needed for Shortness of Air., Disp: 18 g, Rfl: 0  •  benzonatate (Tessalon Perles) 100 MG capsule, Take 1-2 capsules by mouth 3 (Three) Times a Day As Needed for Cough., Disp: 40 capsule, Rfl: 0  •  clarithromycin (BIAXIN) 500 MG tablet, Every 12 (Twelve) Hours., Disp: , Rfl:   •  dextromethorphan-guaifenesin (MUCINEX DM MAX STRENGTH)  MG per 12 hr tablet, Mucinex DM 60 mg-1,200 mg tablet,extended release 12 hr  1 Tablet BID as needed for cough, Disp: , Rfl:   •  furosemide (LASIX) 40 MG tablet, 1 daily prn fluid retention, Disp: 90 tablet, Rfl: 0  •  levothyroxine (SYNTHROID, LEVOTHROID) 100 MCG tablet, Take 1 tablet by mouth Daily., Disp: 30 tablet,  Rfl: 11  •  Liraglutide (SAXENDA) 18 MG/3ML injection pen, Inject 0.6mg under skin daily for week one, THEN 1.2mg daily for week two, THEN 1.8mg daily for week three, then 2.4mg daily for week four., Disp: 3 pen, Rfl: 0  •  losartan (COZAAR) 50 MG tablet, Take 50 mg by mouth Daily., Disp: , Rfl:   •  metoprolol tartrate (LOPRESSOR) 25 MG tablet, TAKE 1 TABLET BY MOUTH TWICE DAILY, Disp: 180 tablet, Rfl: 1  •  omeprazole (priLOSEC) 40 MG capsule, Take 1 capsule by mouth Daily., Disp: 30 capsule, Rfl: 11  •  oxybutynin XL (DITROPAN-XL) 10 MG 24 hr tablet, 10 mg Daily., Disp: , Rfl:   •  rosuvastatin (CRESTOR) 10 MG tablet, Take 1 tablet by mouth Daily., Disp: 90 tablet, Rfl: 3  •  spironolactone (ALDACTONE) 50 MG tablet, Take 50 mg by mouth Daily., Disp: , Rfl:   •  Fluticasone Furoate-Vilanterol (Breo Ellipta) 100-25 MCG/INH inhaler, Inhale 1 puff Daily., Disp: 1 inhaler, Rfl: 5    Return in about 14 weeks (around 10/24/2022) for Next scheduled follow up or if not improving this week.     I spent 50 minutes caring for Katharine on this date of service. This time includes time spent by me in the following activities: preparing for the visit, reviewing tests, obtaining and/or reviewing a separately obtained history, performing a medically appropriate examination and/or evaluation, counseling and educating the patient/family/caregiver, ordering medications, tests, or procedures and documenting information in the medical record

## 2022-09-29 ENCOUNTER — TELEPHONE (OUTPATIENT)
Dept: INTERNAL MEDICINE | Facility: CLINIC | Age: 62
End: 2022-09-29

## 2022-10-02 DIAGNOSIS — R60.0 BILATERAL LOWER EXTREMITY EDEMA: Chronic | ICD-10-CM

## 2022-10-02 DIAGNOSIS — H65.491 CHRONIC MIDDLE EAR EFFUSION, RIGHT: ICD-10-CM

## 2022-10-02 DIAGNOSIS — J30.9 ALLERGIC RHINITIS, UNSPECIFIED SEASONALITY, UNSPECIFIED TRIGGER: ICD-10-CM

## 2022-10-02 DIAGNOSIS — M79.89 LEG SWELLING: ICD-10-CM

## 2022-10-04 RX ORDER — LEVOCETIRIZINE DIHYDROCHLORIDE 5 MG/1
5 TABLET, FILM COATED ORAL EVERY EVENING
Qty: 30 TABLET | Refills: 2 | OUTPATIENT
Start: 2022-10-04

## 2022-10-04 RX ORDER — FUROSEMIDE 40 MG/1
TABLET ORAL
Qty: 30 TABLET | Refills: 1 | Status: SHIPPED | OUTPATIENT
Start: 2022-10-04 | End: 2022-12-05

## 2022-10-04 RX ORDER — FLUTICASONE PROPIONATE 50 MCG
SPRAY, SUSPENSION (ML) NASAL
Qty: 16 G | Refills: 2 | OUTPATIENT
Start: 2022-10-04

## 2022-12-04 DIAGNOSIS — R60.0 BILATERAL LOWER EXTREMITY EDEMA: Chronic | ICD-10-CM

## 2022-12-04 DIAGNOSIS — M79.89 LEG SWELLING: ICD-10-CM

## 2022-12-05 RX ORDER — FUROSEMIDE 40 MG/1
TABLET ORAL
Qty: 30 TABLET | Refills: 1 | Status: SHIPPED | OUTPATIENT
Start: 2022-12-05 | End: 2023-02-02

## 2022-12-05 NOTE — TELEPHONE ENCOUNTER
Rx Refill Note  Requested Prescriptions     Pending Prescriptions Disp Refills   • furosemide (LASIX) 40 MG tablet [Pharmacy Med Name: FUROSEMIDE 40MG TABLETS] 30 tablet 1     Sig: TAKE 1 TABLET BY MOUTH DAILY AS NEEDED FOR FLUID RETENTION      Last filled:10/04/2022  Last office visit with prescribing clinician: 6/6/2022      Next office visit with prescribing clinician: Visit date not found     April JOSEPH Kellogg MA  12/05/22, 08:52 EST

## 2022-12-26 PROBLEM — I71.21 ASCENDING AORTIC ANEURYSM (HCC): Status: ACTIVE | Noted: 2022-12-26

## 2023-01-25 PROCEDURE — 87205 SMEAR GRAM STAIN: CPT | Performed by: NURSE PRACTITIONER

## 2023-01-25 PROCEDURE — 87106 FUNGI IDENTIFICATION YEAST: CPT | Performed by: NURSE PRACTITIONER

## 2023-01-25 PROCEDURE — 87070 CULTURE OTHR SPECIMN AEROBIC: CPT | Performed by: NURSE PRACTITIONER

## 2023-02-02 DIAGNOSIS — R60.0 BILATERAL LOWER EXTREMITY EDEMA: Chronic | ICD-10-CM

## 2023-02-02 DIAGNOSIS — M79.89 LEG SWELLING: ICD-10-CM

## 2023-02-02 RX ORDER — FUROSEMIDE 40 MG/1
TABLET ORAL
Qty: 30 TABLET | Refills: 0 | Status: SHIPPED | OUTPATIENT
Start: 2023-02-02

## 2023-03-04 DIAGNOSIS — R60.0 BILATERAL LOWER EXTREMITY EDEMA: Chronic | ICD-10-CM

## 2023-03-04 DIAGNOSIS — M79.89 LEG SWELLING: ICD-10-CM

## 2023-03-06 RX ORDER — FUROSEMIDE 40 MG/1
TABLET ORAL
Qty: 30 TABLET | Refills: 0 | OUTPATIENT
Start: 2023-03-06

## 2023-03-06 RX ORDER — LEVOTHYROXINE SODIUM 0.1 MG/1
100 TABLET ORAL DAILY
Qty: 30 TABLET | Refills: 11 | Status: CANCELLED | OUTPATIENT
Start: 2023-03-06

## 2023-03-06 RX ORDER — FLUTICASONE FUROATE AND VILANTEROL 100; 25 UG/1; UG/1
1 POWDER RESPIRATORY (INHALATION)
Qty: 28 EACH | Refills: 0 | Status: SHIPPED | OUTPATIENT
Start: 2023-03-06 | End: 2023-03-07

## 2023-03-06 NOTE — TELEPHONE ENCOUNTER
LV: 7/18/22 w/Dr. Sanchez  NV:   LF:  11/9/2020  Patient does not need the furosemide filled at this time he has plenty.  I sent in a refill on he breo inhaler.

## 2023-03-06 NOTE — TELEPHONE ENCOUNTER
Left message for patient letting him know he is due for an appointment and labs.  I asked him to call the office to schedule.    LV: 7/18/22 with Dr. Sanchez  NV: not scheduled  LL: 6/6/22  LF: 2/2/23 30/0

## 2023-03-06 NOTE — TELEPHONE ENCOUNTER
Caller: Katharine Lantigua LOUIS    Relationship: Self    Best call back number: [phone]    Requested Prescriptions:   Requested Prescriptions     Pending Prescriptions Disp Refills   • Fluticasone Furoate-Vilanterol (Breo Ellipta) 100-25 MCG/ACT aerosol powder  1 inhaler 5     Sig: Inhale 1 puff Daily.   • levothyroxine (SYNTHROID, LEVOTHROID) 100 MCG tablet 30 tablet 11     Sig: Take 1 tablet by mouth Daily.        Pharmacy where request should be sent: [id number] - [address] AT [facility] - [phone]  - [phone]      Additional details provided by patient: PATIENT HAS LESS THAN 3 DAYS.  PATIENT HAS SCHEDULED HIS PHYSICAL FOR THE FIRST AVAILABLE WITH DR. KNAPP ON 04.12.23 AT 1:45PM. PATIENT IS WANTING TO KNOW IF MEDICATION CAN BE SENT IN FOR JUST ENOUGH TO GET TO THAT APPOINTMENT. PLEASE LET HIM KNOW IF THIS CANNOT BE DONE.    Does the patient have less than a 3 day supply:  [x] Yes  [] No    Would you like a call back once the refill request has been completed: [] Yes [x] No    If the office needs to give you a call back, can they leave a voicemail: [x] Yes [] No    Mauro Cosme   03/06/23 14:11 EST

## 2023-03-07 ENCOUNTER — OFFICE VISIT (OUTPATIENT)
Dept: INTERNAL MEDICINE | Facility: CLINIC | Age: 63
End: 2023-03-07
Payer: COMMERCIAL

## 2023-03-07 ENCOUNTER — LAB (OUTPATIENT)
Dept: INTERNAL MEDICINE | Facility: CLINIC | Age: 63
End: 2023-03-07
Payer: COMMERCIAL

## 2023-03-07 VITALS
WEIGHT: 315 LBS | HEART RATE: 64 BPM | HEIGHT: 71 IN | OXYGEN SATURATION: 96 % | SYSTOLIC BLOOD PRESSURE: 142 MMHG | BODY MASS INDEX: 44.1 KG/M2 | TEMPERATURE: 97.1 F | DIASTOLIC BLOOD PRESSURE: 98 MMHG

## 2023-03-07 DIAGNOSIS — R73.09 ELEVATED GLUCOSE: ICD-10-CM

## 2023-03-07 DIAGNOSIS — E55.9 VITAMIN D DEFICIENCY: ICD-10-CM

## 2023-03-07 DIAGNOSIS — I10 ESSENTIAL HYPERTENSION: Chronic | ICD-10-CM

## 2023-03-07 DIAGNOSIS — K21.9 GASTROESOPHAGEAL REFLUX DISEASE WITHOUT ESOPHAGITIS: ICD-10-CM

## 2023-03-07 DIAGNOSIS — Z23 NEED FOR COVID-19 VACCINE: ICD-10-CM

## 2023-03-07 DIAGNOSIS — E66.01 CLASS 3 SEVERE OBESITY DUE TO EXCESS CALORIES WITH SERIOUS COMORBIDITY AND BODY MASS INDEX (BMI) OF 40.0 TO 44.9 IN ADULT: ICD-10-CM

## 2023-03-07 DIAGNOSIS — Z00.00 ROUTINE GENERAL MEDICAL EXAMINATION AT A HEALTH CARE FACILITY: ICD-10-CM

## 2023-03-07 DIAGNOSIS — E53.8 B12 DEFICIENCY: ICD-10-CM

## 2023-03-07 DIAGNOSIS — E03.9 ACQUIRED HYPOTHYROIDISM: Chronic | ICD-10-CM

## 2023-03-07 DIAGNOSIS — Z12.5 PROSTATE CANCER SCREENING: ICD-10-CM

## 2023-03-07 DIAGNOSIS — G47.33 OSA (OBSTRUCTIVE SLEEP APNEA): ICD-10-CM

## 2023-03-07 DIAGNOSIS — R49.0 HOARSENESS: ICD-10-CM

## 2023-03-07 DIAGNOSIS — Z00.00 ROUTINE GENERAL MEDICAL EXAMINATION AT A HEALTH CARE FACILITY: Primary | ICD-10-CM

## 2023-03-07 LAB
25(OH)D3 SERPL-MCNC: 9.7 NG/ML (ref 30–100)
ALBUMIN SERPL-MCNC: 4.2 G/DL (ref 3.5–5.2)
ALBUMIN/GLOB SERPL: 1.5 G/DL
ALP SERPL-CCNC: 87 U/L (ref 39–117)
ALT SERPL W P-5'-P-CCNC: 19 U/L (ref 1–41)
ANION GAP SERPL CALCULATED.3IONS-SCNC: 8 MMOL/L (ref 5–15)
AST SERPL-CCNC: 15 U/L (ref 1–40)
BILIRUB SERPL-MCNC: 0.6 MG/DL (ref 0–1.2)
BUN SERPL-MCNC: 16 MG/DL (ref 8–23)
BUN/CREAT SERPL: 17.4 (ref 7–25)
CALCIUM SPEC-SCNC: 9.1 MG/DL (ref 8.6–10.5)
CHLORIDE SERPL-SCNC: 102 MMOL/L (ref 98–107)
CHOLEST SERPL-MCNC: 146 MG/DL (ref 0–200)
CO2 SERPL-SCNC: 28 MMOL/L (ref 22–29)
CREAT SERPL-MCNC: 0.92 MG/DL (ref 0.76–1.27)
DEPRECATED RDW RBC AUTO: 42 FL (ref 37–54)
EGFRCR SERPLBLD CKD-EPI 2021: 93.5 ML/MIN/1.73
ERYTHROCYTE [DISTWIDTH] IN BLOOD BY AUTOMATED COUNT: 13 % (ref 12.3–15.4)
GLOBULIN UR ELPH-MCNC: 2.8 GM/DL
GLUCOSE SERPL-MCNC: 101 MG/DL (ref 65–99)
HCT VFR BLD AUTO: 44.5 % (ref 37.5–51)
HDLC SERPL-MCNC: 62 MG/DL (ref 40–60)
HGB BLD-MCNC: 15.1 G/DL (ref 13–17.7)
LDLC SERPL CALC-MCNC: 69 MG/DL (ref 0–100)
LDLC/HDLC SERPL: 1.1 {RATIO}
MCH RBC QN AUTO: 30.3 PG (ref 26.6–33)
MCHC RBC AUTO-ENTMCNC: 33.9 G/DL (ref 31.5–35.7)
MCV RBC AUTO: 89.4 FL (ref 79–97)
PLATELET # BLD AUTO: 296 10*3/MM3 (ref 140–450)
PMV BLD AUTO: 10.3 FL (ref 6–12)
POTASSIUM SERPL-SCNC: 4.3 MMOL/L (ref 3.5–5.2)
PROT SERPL-MCNC: 7 G/DL (ref 6–8.5)
PSA SERPL-MCNC: 1.23 NG/ML (ref 0–4)
RBC # BLD AUTO: 4.98 10*6/MM3 (ref 4.14–5.8)
SODIUM SERPL-SCNC: 138 MMOL/L (ref 136–145)
TRIGL SERPL-MCNC: 78 MG/DL (ref 0–150)
TSH SERPL DL<=0.05 MIU/L-ACNC: 3.05 UIU/ML (ref 0.27–4.2)
VIT B12 BLD-MCNC: 666 PG/ML (ref 211–946)
VLDLC SERPL-MCNC: 15 MG/DL (ref 5–40)
WBC NRBC COR # BLD: 7.66 10*3/MM3 (ref 3.4–10.8)

## 2023-03-07 PROCEDURE — 82306 VITAMIN D 25 HYDROXY: CPT | Performed by: INTERNAL MEDICINE

## 2023-03-07 PROCEDURE — 83036 HEMOGLOBIN GLYCOSYLATED A1C: CPT | Performed by: INTERNAL MEDICINE

## 2023-03-07 PROCEDURE — 84443 ASSAY THYROID STIM HORMONE: CPT | Performed by: INTERNAL MEDICINE

## 2023-03-07 PROCEDURE — 36415 COLL VENOUS BLD VENIPUNCTURE: CPT | Performed by: INTERNAL MEDICINE

## 2023-03-07 PROCEDURE — 82607 VITAMIN B-12: CPT | Performed by: INTERNAL MEDICINE

## 2023-03-07 PROCEDURE — 99396 PREV VISIT EST AGE 40-64: CPT | Performed by: INTERNAL MEDICINE

## 2023-03-07 PROCEDURE — 80053 COMPREHEN METABOLIC PANEL: CPT | Performed by: INTERNAL MEDICINE

## 2023-03-07 PROCEDURE — 91312 COVID-19 (PFIZER) BIVALENT BOOSTER 12+YRS: CPT | Performed by: INTERNAL MEDICINE

## 2023-03-07 PROCEDURE — 80061 LIPID PANEL: CPT | Performed by: INTERNAL MEDICINE

## 2023-03-07 PROCEDURE — 0154A: CPT | Performed by: INTERNAL MEDICINE

## 2023-03-07 PROCEDURE — 85027 COMPLETE CBC AUTOMATED: CPT | Performed by: INTERNAL MEDICINE

## 2023-03-07 PROCEDURE — G0103 PSA SCREENING: HCPCS | Performed by: INTERNAL MEDICINE

## 2023-03-07 RX ORDER — LOSARTAN POTASSIUM 100 MG/1
100 TABLET ORAL DAILY
Qty: 30 TABLET | Refills: 5 | Status: SHIPPED | OUTPATIENT
Start: 2023-03-07

## 2023-03-07 RX ORDER — OMEPRAZOLE 40 MG/1
CAPSULE, DELAYED RELEASE ORAL
Qty: 60 CAPSULE | Refills: 11 | Status: SHIPPED | OUTPATIENT
Start: 2023-03-07

## 2023-03-07 NOTE — PROGRESS NOTES
History of Present Illness   Here for a physical    Exercise: none currently he has not been able to walk or do stationary bike because of pain. Was not able to swim much this summer either.  Diet: not been good- fast food frequently.   he is taking b12 1000 regularly. He may try Nutrisystem to help him lose weight  ETOH - has cut back -may drink once every 8 to 10 days, 4 at a setting     Morbid obesity- we did prescribe Saxenda and he tried to do them, but he did not feel comfortable doing shots     A. Fib s/p ablation-patient is off of Eliquis; he is on metoprolol. He did see Dr Melgar  in 11/22     Ascending aortic aneurysm - 4.7-4.9cm - had CTA in 11/22 at [facility] w/ Dr Melgar.  Repeat CT in 1 year recommended     Low normal B12-B12 level was 323 in 6/22.    He is taking the B12 now 1000 mcg daily     Hyperlipidemia-patient on Crestor.  Last FLP was  6/22 and LDL was 48.  He is fasting today.  On his recent CTA he does have coronary calcifications.  Not on aspirin.     R knee pain and effusion- he saw Dr Cho in 8/22 and lymes and rhem testing was negative.  There has been discussion about doing arthroscopy but knee has done better here in recent months So he has postponed      LBP - he sees Dr bowens and needs surgery.  Now that knee is doing better he may call Dr. Pearson office and get scheduled     Chronic cough/hoarseness and ear pain - he has been seeing Dr Molina in ENT and felt to have ETD and reflux. Also saw Dr Gee in pulmonary and PFTs showed mild restriction and he had a HRCT which was OK.Dr. Gee felt his shortness was secondary to weight gain.  He is hoarse about 1/2 the time, has chronic cough as well. Doesn't drink a lot of fluids.  Reflux is controlled w/ omperazole 40-takes in the morning does not feel any heartburn  He is not on AHs  He does take mucinex DM  The hoarseness maybe worse when he first wakes up but can hear during the day as well.  Has never had EGD     BPH/urge  incontinence - he saw Dr Sams in 11/22 and is on oxybutynin.      htn - BPs have been high recently. He is taking losartan 50; metop bid, he has not been on spironolactone or Lasix in recent months.  Swelling has been better as well    Constipation- has been more of a problem recently goes anywhere between 3 and 5 days    TACO- he has struggled to find the CPAP he can tolerate.  He is willing to reestablish with sleep clinic and try again.  He is a heavy snorer.  He is has been sleeping in recliner, but usually keeps the recliner flat and sleeps on his side    Current Outpatient Medications on File Prior to Visit   Medication Sig Dispense Refill   • acetaminophen (TYLENOL) 500 MG tablet Take 2 tablets by mouth 2 (Two) Times a Day.     • furosemide (LASIX) 40 MG tablet TAKE 1 TABLET BY MOUTH DAILY AS NEEDED FOR FLUID RETENTION 30 tablet 0   • levothyroxine (SYNTHROID, LEVOTHROID) 100 MCG tablet Take 1 tablet by mouth Daily. 30 tablet 11   • metoprolol tartrate (LOPRESSOR) 25 MG tablet Take 1 tablet by mouth 2 (Two) Times a Day. 180 tablet 1   • oxybutynin XL (DITROPAN XL) 15 MG 24 hr tablet Take 1 tablet by mouth Daily.     • rosuvastatin (CRESTOR) 10 MG tablet Take 1 tablet by mouth Daily. 90 tablet 3   • [DISCONTINUED] Fluticasone Furoate-Vilanterol (Breo Ellipta) 100-25 MCG/ACT aerosol powder  Inhale 1 puff Daily. 28 each 0   • [DISCONTINUED] losartan (COZAAR) 50 MG tablet Take 1 tablet by mouth Daily.     • [DISCONTINUED] spironolactone (ALDACTONE) 50 MG tablet Take 1 tablet by mouth Daily.     • [DISCONTINUED] acetaminophen (TYLENOL) 500 MG tablet Take 500 mg by mouth.     • [DISCONTINUED] amoxicillin-clavulanate (AUGMENTIN) 875-125 MG per tablet Take 1 tablet by mouth 2 (Two) Times a Day. 20 tablet 0   • [DISCONTINUED] levothyroxine (SYNTHROID, LEVOTHROID) 100 MCG tablet Take 100 mcg by mouth.     • [DISCONTINUED] metoprolol tartrate (LOPRESSOR) 25 MG tablet Take 25 mg by mouth.       No current  "facility-administered medications on file prior to visit.       The following portions of the patient's history were reviewed and updated as appropriate: allergies, current medications, past family history, past medical history, past social history, past surgical history and problem list.    Review of Systems   Constitutional: Positive for fatigue and unexpected weight gain. Negative for activity change, appetite change, fever and unexpected weight loss.   HENT: Positive for ear pain and voice change (hoarseness).    Eyes: Negative.    Respiratory: Positive for cough. Negative for shortness of breath and wheezing.    Cardiovascular: Negative for chest pain, palpitations and leg swelling.   Gastrointestinal: Negative.  Negative for GERD (controlled on prilosec 40).   Endocrine: Negative.    Genitourinary: Negative for difficulty urinating and dysuria.   Musculoskeletal: Positive for arthralgias and back pain.   Skin: Negative.    Allergic/Immunologic: Negative for immunocompromised state.   Neurological: Negative for seizures, speech difficulty, memory problem and confusion.   Hematological: Does not bruise/bleed easily.   Psychiatric/Behavioral: Positive for sleep disturbance. Negative for agitation. Stress: heavy snorer.         Objective   /98 (BP Location: Right arm, Patient Position: Sitting)   Pulse 64   Temp 97.1 °F (36.2 °C) (Infrared)   Ht 179.3 cm (70.6\")   Wt (!) 147 kg (325 lb)   SpO2 96%   BMI 45.84 kg/m²   Physical Exam   Physical Exam  Vitals and nursing note reviewed.   Constitutional:       General: He is not in acute distress.     Appearance: He is well-developed. He is not diaphoretic.   HENT:      Head: Normocephalic and atraumatic.      Right Ear: Ear canal and external ear normal. There is no impacted cerumen.      Left Ear: Ear canal and external ear normal. There is no impacted cerumen.      Ears:      Comments: Bilateral TMs dull but no erythema  OP- appears very dry and uvula is " slightly irritated/erythematous.  No exudate, no drainage     Nose: Nose normal.      Mouth/Throat:      Pharynx: No oropharyngeal exudate.   Eyes:      General: No scleral icterus.        Right eye: No discharge.         Left eye: No discharge.      Conjunctiva/sclera: Conjunctivae normal.      Pupils: Pupils are equal, round, and reactive to light.   Neck:      Thyroid: No thyromegaly.   Cardiovascular:      Rate and Rhythm: Normal rate and regular rhythm.      Heart sounds: Normal heart sounds. No murmur heard.  Pulmonary:      Effort: Pulmonary effort is normal. No respiratory distress.      Breath sounds: Normal breath sounds. No stridor. No wheezing or rales.   Abdominal:      General: Bowel sounds are normal. There is no distension.      Palpations: Abdomen is soft. There is no mass.      Tenderness: There is no abdominal tenderness. There is no guarding or rebound.   Musculoskeletal:         General: No deformity. Normal range of motion.      Cervical back: Normal range of motion and neck supple.      Right lower leg: No edema.      Left lower leg: No edema.   Lymphadenopathy:      Cervical: No cervical adenopathy.   Skin:     General: Skin is warm and dry.      Coloration: Skin is not pale.      Findings: No erythema or rash.   Neurological:      Mental Status: He is alert and oriented to person, place, and time.      Coordination: Coordination normal.      Deep Tendon Reflexes: Reflexes normal.   Psychiatric:         Mood and Affect: Mood normal.         Behavior: Behavior normal.         Thought Content: Thought content normal.         Judgment: Judgment normal.           Assessment/Plan   Diagnoses and all orders for this visit:    1. Routine general medical examination at a health care facility (Primary)  -     PSA Screen; Future  -     CBC (No Diff); Future  -     Comprehensive Metabolic Panel; Future  -     Lipid Panel; Future  -     TSH; Future  Regular exercise, healthy diet.   Check fasting  labs  DT due7/24   Colon due 1/24   Shingles - he had shingrex   Prostate screening - check PSA today  Would add baby asa daily (coronary calcifications)    2. B12 deficiency- on b12, recheck today  -     Vitamin B12; Future    3. Vitamin D deficiency- recheck today  -     Vitamin D,25-Hydroxy; Future    4. Elevated glucose- check a1c  -     Hemoglobin A1c; Future    5. Prostate cancer screening- sees urology but not clear if he had a PSA recently, will check today  -     PSA Screen; Future    6. Need for COVID-19 vaccine  -     COVID-19 Bivalent Booster (Pfizer) 12+yrs    7. TACO (obstructive sleep apnea)- he is willing to try CPAP again. Will put in referral  -     Ambulatory Referral to Sleep Medicine    8. Essential hypertension- high today and recent readings. We will raise losartan to 100  -     losartan (Cozaar) 100 MG tablet; Take 1 tablet by mouth Daily.  Dispense: 30 tablet; Refill: 5    9. Acquired hypothyroidism- recheck today    10. Hoarseness- OP seems very dry today. Try to increase fluids, try the gargling w/ salt water. We will raise prilosec to bid in case it is reflux  -     omeprazole (priLOSEC) 40 MG capsule; 1 bid  Dispense: 60 capsule; Refill: 11    11. Gastroesophageal reflux disease without esophagitis  -     omeprazole (priLOSEC) 40 MG capsule; 1 bid  Dispense: 60 capsule; Refill: 11    12. Class 3 severe obesity due to excess calories with serious comorbidity and body mass index (BMI) of 40.0 to 44.9 in adult (HCC)- he is willing to try GLP-1's again. Side effects reviewed, no h/o pancreatitis or meducallry thyroid ca  -     Semaglutide-Weight Management 0.25 MG/0.5ML solution auto-injector; Inject 0.25 mg under the skin into the appropriate area as directed 1 (One) Time Per Week.  Dispense: 4 mL; Refill: 0

## 2023-03-08 LAB — HBA1C MFR BLD: 5.7 % (ref 4.8–5.6)

## 2023-03-10 ENCOUNTER — TELEPHONE (OUTPATIENT)
Dept: INTERNAL MEDICINE | Facility: CLINIC | Age: 63
End: 2023-03-10
Payer: COMMERCIAL

## 2023-03-10 NOTE — TELEPHONE ENCOUNTER
Patient states that he would like some medication called in for cough.  He saw Dr Chandler 3/7 - but cough has gotten progressively worse.  He states that he is having shortness of breath as well.  HUB advised him to ER but he did not want to go.  Patient was also advised that he might need an appt for meds.

## 2023-03-13 RX ORDER — CEFDINIR 300 MG/1
300 CAPSULE ORAL 2 TIMES DAILY
Qty: 14 CAPSULE | Refills: 0 | Status: SHIPPED | OUTPATIENT
Start: 2023-03-13

## 2023-03-13 RX ORDER — PREDNISONE 20 MG/1
20 TABLET ORAL 2 TIMES DAILY
Qty: 10 TABLET | Refills: 0 | Status: SHIPPED | OUTPATIENT
Start: 2023-03-13

## 2023-03-13 NOTE — TELEPHONE ENCOUNTER
Per dr. Chandler:    I sent in a round of antibiotics and steroids to see if this helps with the symptoms. If SOB worsens, would recommend going to ED to be evaluated. He has a f/u with me in April, but can also return to our office sooner if no better     Called and lvm for patient to return call, office number provided.      HUB TO READ IF PT RTNS CALL PLEASE RELAY MSG FROM PCP:      I sent in a round of antibiotics and steroids to see if this helps with the symptoms. If SOB worsens, would recommend going to ED to be evaluated. He has a f/u with me in April, but can also return to our office sooner if no better

## 2023-03-13 NOTE — TELEPHONE ENCOUNTER
"Called to get an update on patients condition, pt has a sore throat, really persistent cough, he is coughing up phlegm that is green in color, fullness in his right ear. He states that his main concern is the cough, he states that it is keeping him up at night now. He is still having SOA, he thinks it is related to two things. First is being \"unconditioned\" from his weight/conditions. He states that he is really concerned when he coughs he feels like the mucous is \"blocking his air\". He is using his albuterol inhaler with minimal relief for the short term. He states that he does cough a lot after he is using the albuterol inhaler to the point where he feels like he isn't getting the medication.     Confirmed Pharmacy: [facility]   "

## 2023-03-14 ENCOUNTER — TELEPHONE (OUTPATIENT)
Dept: INTERNAL MEDICINE | Facility: CLINIC | Age: 63
End: 2023-03-14
Payer: COMMERCIAL

## 2023-03-14 RX ORDER — CHOLECALCIFEROL (VITAMIN D3) 1250 MCG
50000 CAPSULE ORAL
Qty: 4 CAPSULE | Refills: 2 | Status: SHIPPED | OUTPATIENT
Start: 2023-03-14

## 2023-03-14 NOTE — TELEPHONE ENCOUNTER
----- Message from Gail Chandler MD sent at 3/14/2023  2:39 PM EDT -----  Can you let him know, labs look stable except his vitamin D level is very low. I am going to send in the high dose vitamin D for him to take for 3 months then we can recheck the D

## 2023-03-14 NOTE — TELEPHONE ENCOUNTER
Patient called back he is having a different cough now.  He is not coughing up any mucus at this time.  He does not have as much shortness of breath but when air hits his windpipe he coughs more.

## 2023-03-21 ENCOUNTER — TELEPHONE (OUTPATIENT)
Dept: INTERNAL MEDICINE | Facility: CLINIC | Age: 63
End: 2023-03-21
Payer: COMMERCIAL

## 2023-04-04 RX ORDER — FLUTICASONE FUROATE AND VILANTEROL TRIFENATATE 100; 25 UG/1; UG/1
POWDER RESPIRATORY (INHALATION)
Qty: 60 EACH | OUTPATIENT
Start: 2023-04-04

## 2023-05-08 ENCOUNTER — TELEPHONE (OUTPATIENT)
Dept: INTERNAL MEDICINE | Facility: CLINIC | Age: 63
End: 2023-05-08
Payer: COMMERCIAL

## 2023-05-08 DIAGNOSIS — E55.9 VITAMIN D DEFICIENCY: Primary | ICD-10-CM

## 2023-05-08 NOTE — TELEPHONE ENCOUNTER
Patient requested that labs be put in - if possible for him Tuesday AM so that results could be discussed at his appt Wednesday. I did explain to patient that provider has been on vacation.  Please call patient if we are able to get labs in.

## 2023-05-09 NOTE — TELEPHONE ENCOUNTER
Patient has been notified lab order is in the system.  He wanted to reschedule his appointment from Wednesday to Friday.  This has been done.

## 2023-05-09 NOTE — TELEPHONE ENCOUNTER
Dr. Chandler put him on high dose vitamin d and will recheck in 3 months.  Not sure if he needs any labs at this time.  Last labs were on 3/14/23.  Alix can you look at this?

## 2023-05-11 ENCOUNTER — LAB (OUTPATIENT)
Dept: INTERNAL MEDICINE | Facility: CLINIC | Age: 63
End: 2023-05-11
Payer: COMMERCIAL

## 2023-05-11 DIAGNOSIS — E55.9 VITAMIN D DEFICIENCY: ICD-10-CM

## 2023-05-11 DIAGNOSIS — M62.81 MUSCLE WEAKNESS: ICD-10-CM

## 2023-05-11 PROCEDURE — 82306 VITAMIN D 25 HYDROXY: CPT | Performed by: NURSE PRACTITIONER

## 2023-05-11 PROCEDURE — 36415 COLL VENOUS BLD VENIPUNCTURE: CPT | Performed by: NURSE PRACTITIONER

## 2023-05-11 PROCEDURE — 82550 ASSAY OF CK (CPK): CPT | Performed by: NURSE PRACTITIONER

## 2023-05-12 ENCOUNTER — OFFICE VISIT (OUTPATIENT)
Dept: INTERNAL MEDICINE | Facility: CLINIC | Age: 63
End: 2023-05-12
Payer: COMMERCIAL

## 2023-05-12 ENCOUNTER — TELEPHONE (OUTPATIENT)
Dept: INTERNAL MEDICINE | Facility: CLINIC | Age: 63
End: 2023-05-12

## 2023-05-12 VITALS
DIASTOLIC BLOOD PRESSURE: 84 MMHG | HEART RATE: 55 BPM | WEIGHT: 315 LBS | HEIGHT: 71 IN | TEMPERATURE: 98 F | OXYGEN SATURATION: 94 % | BODY MASS INDEX: 44.1 KG/M2 | SYSTOLIC BLOOD PRESSURE: 124 MMHG

## 2023-05-12 DIAGNOSIS — M62.81 MUSCLE WEAKNESS: ICD-10-CM

## 2023-05-12 DIAGNOSIS — E55.9 VITAMIN D DEFICIENCY: Primary | ICD-10-CM

## 2023-05-12 DIAGNOSIS — R68.2 MOUTH DRYNESS: ICD-10-CM

## 2023-05-12 DIAGNOSIS — G47.33 OSA (OBSTRUCTIVE SLEEP APNEA): ICD-10-CM

## 2023-05-12 LAB
25(OH)D3 SERPL-MCNC: 27.7 NG/ML (ref 30–100)
CK SERPL-CCNC: 112 U/L (ref 20–200)

## 2023-05-12 PROCEDURE — 99214 OFFICE O/P EST MOD 30 MIN: CPT | Performed by: INTERNAL MEDICINE

## 2023-05-12 RX ORDER — COVID-19 ANTIGEN TEST
2 KIT MISCELLANEOUS 2 TIMES DAILY
COMMUNITY

## 2023-05-12 NOTE — TELEPHONE ENCOUNTER
----- Message from Gail Chandler MD sent at 5/12/2023 10:36 AM EDT -----  Can you let him know the muscle test we added onto his blood work is fine so would keep taking his Crestor

## 2023-05-12 NOTE — PROGRESS NOTES
Chief Complaint  Vitamin D Deficiency (F/u), Dehydration (He is really dehydrated.), and fine motor problems (He is going to talk with Dr. River but he is having  strength problems and fine motor skill issues.)    Subjective          Katharine Lantigua presents to [facility]  History of Present Illness    Vitamin D deficiency-vitamin D level was 9 when we checked 2 months ago.  We started high-dose vitamin D and it was rechecked last week and level was up to 27.  He cannot tell any difference in how he feels-still feels very tired and a lot of pain    Morbid obesity- we had sent in Ozempic when he was here last visit but he states it was too expensive, thousand dollars    Mouth very dry-he has been on oxybutynin through urology since 2/22.  He tries to drink lots of fluids but does not seem to help with the dry mouth.  He does not feel like the oxybutynin has helped much with urinary frequency.can be difficult to speak as mouth is so dry    R shoulder pain is better- will see Dr river soon for follow-up    Hand weakness bilaterally-he has noticed this over the last month.  Hard to do fine motor skills like buttoning his shirt or shaving.  He does not have any hand pain or burning or numbness in the hands but he feels like they are weak-both his arm strength as well has his  strength.  Does not feel the leg weakness as much.  No neck problems.  He has been trying to do some arm exercises with weights recently, but feels like he can only do 10 lb weights.  He is on statin -Crestor    TACO-he did see Dr. Amin several years ago but has not been able to tolerate CPAP.  He does want to get reestablished with sleep clinic        Current Outpatient Medications:   •  Cholecalciferol (Vitamin D3) 1.25 MG (71686 UT) capsule, Take 1 capsule by mouth Every 7 (Seven) Days., Disp: 4 capsule, Rfl: 2  •  furosemide (LASIX) 40 MG tablet, TAKE 1 TABLET BY MOUTH DAILY AS NEEDED FOR FLUID RETENTION,  "Disp: 30 tablet, Rfl: 0  •  levothyroxine (SYNTHROID, LEVOTHROID) 100 MCG tablet, Take 1 tablet by mouth Daily., Disp: 30 tablet, Rfl: 11  •  losartan (Cozaar) 100 MG tablet, Take 1 tablet by mouth Daily., Disp: 30 tablet, Rfl: 5  •  metoprolol tartrate (LOPRESSOR) 25 MG tablet, TAKE 1 TABLET BY MOUTH TWICE DAILY, Disp: 180 tablet, Rfl: 1  •  Naproxen Sodium (Aleve) 220 MG capsule, Take 2 capsules by mouth 2 (Two) Times a Day., Disp: , Rfl:   •  omeprazole (priLOSEC) 40 MG capsule, 1 bid, Disp: 60 capsule, Rfl: 11  •  rosuvastatin (CRESTOR) 10 MG tablet, Take 1 tablet by mouth Daily., Disp: 90 tablet, Rfl: 3  •  Semaglutide-Weight Management 0.25 MG/0.5ML solution auto-injector, Inject 0.25 mg under the skin into the appropriate area as directed 1 (One) Time Per Week. (Patient not taking: Reported on 5/12/2023), Disp: 4 mL, Rfl: 0   The following portions of the patient's history were reviewed and updated as appropriate: allergies, current medications, past family history, past medical history, past social history, past surgical history and problem list.     Objective   Vital Signs:   /84 (BP Location: Left arm, Patient Position: Sitting)   Pulse 55   Temp 98 °F (36.7 °C) (Infrared)   Ht 179.3 cm (70.6\")   Wt (!) 147 kg (324 lb)   SpO2 94%   BMI 45.70 kg/m²       Physical exam  Constitutional: oriented to person, place, and time.  well-developed and well-nourished. No distress.   HENT: OP- oral mucosa dry  Head: Normocephalic and atraumatic.   Eyes: Conjunctivae and EOM are normal.   Cardiovascular: Normal rate, regular rhythm and normal heart sounds.  Exam reveals no gallop and no friction rub.    No murmur heard.  Pulmonary/Chest: Effort normal and breath sounds normal. No respiratory distress.   no wheezes.   Neurological:  alert and oriented to person, place, and time.  DTR's symmetrical bilateral upper extremities.   strength does seem decreased bilaterally.  No deformities in his fingers  Skin: " Skin is warm and dry. not diaphoretic.   Psychiatric:  normal mood and affect. behavior is normal. Judgment and thought content normal.      Result Review :   The following data was reviewed by: Gail Chandler MD on 05/12/2023:  Common labs        8/11/2022    15:55 11/3/2022    11:07 3/7/2023    16:01   Common Labs   Glucose   101     BUN   16     Creatinine   0.92     EGFR  Am  96         Sodium   138     Potassium   4.3     Chloride   102     Calcium   9.1     Albumin   4.2     Total Bilirubin   0.6     Alkaline Phosphatase   87     AST (SGOT)   15     ALT (SGPT)   19     WBC   7.66     Hemoglobin   15.1     Hematocrit   44.5     Platelets   296     Total Cholesterol   146     Triglycerides   78     HDL Cholesterol   62     LDL Cholesterol    69     Hemoglobin A1C   5.70     PSA   1.230     Uric Acid 4.5              This result is from an external source.     Lab Results   Component Value Date    UQEC80TJ 27.7 (L) 05/11/2023    GSEBWRNM44 666 03/07/2023    URICACID 4.5 08/11/2022               Assessment and Plan    Diagnoses and all orders for this visit:    1. Vitamin D deficiency (Primary)- D much better. He has 1-2 months still on the high dose and he will finish these out and then switch to 1000u of OTC D3    2. Muscle weakness- is on Crestor - we will check a CK  -     CK; Future    3. TACO (obstructive sleep apnea)- untreated  -     Ambulatory Referral to Sleep Medicine    4. Mouth dryness- he will stop the oxybutynin, and I aksed him to call his urologist to see about other options for his urinary symptoms        Follow Up   Return in about 6 months (around 11/12/2023) for Next scheduled follow up.  Patient was given instructions and counseling regarding his condition or for health maintenance advice. Please see specific information pulled into the AVS if appropriate.

## 2023-05-12 NOTE — TELEPHONE ENCOUNTER
----- Message from Gail Chandler MD sent at 5/12/2023  8:35 AM EDT -----  Regarding: labs  Can you fax his labs from 3/23 to Dr Melgar, his cardiologist at [facility]. He said fax number is [phone]

## 2023-06-14 RX ORDER — LEVOTHYROXINE SODIUM 0.1 MG/1
100 TABLET ORAL DAILY
Qty: 90 TABLET | Refills: 2 | Status: SHIPPED | OUTPATIENT
Start: 2023-06-14

## 2023-08-04 ENCOUNTER — TELEPHONE (OUTPATIENT)
Dept: SLEEP MEDICINE | Facility: HOSPITAL | Age: 63
End: 2023-08-04
Payer: COMMERCIAL

## 2023-08-07 ENCOUNTER — OFFICE VISIT (OUTPATIENT)
Dept: SLEEP MEDICINE | Facility: HOSPITAL | Age: 63
End: 2023-08-07
Payer: COMMERCIAL

## 2023-08-07 VITALS
BODY MASS INDEX: 44.1 KG/M2 | HEIGHT: 71 IN | SYSTOLIC BLOOD PRESSURE: 154 MMHG | DIASTOLIC BLOOD PRESSURE: 88 MMHG | OXYGEN SATURATION: 94 % | WEIGHT: 315 LBS | HEART RATE: 96 BPM

## 2023-08-07 DIAGNOSIS — R06.83 SNORING: ICD-10-CM

## 2023-08-07 DIAGNOSIS — G47.19 EXCESSIVE DAYTIME SLEEPINESS: ICD-10-CM

## 2023-08-07 DIAGNOSIS — G47.33 OSA (OBSTRUCTIVE SLEEP APNEA): ICD-10-CM

## 2023-08-07 DIAGNOSIS — F51.04 PSYCHOPHYSIOLOGICAL INSOMNIA: ICD-10-CM

## 2023-08-07 DIAGNOSIS — G47.8 NON-RESTORATIVE SLEEP: ICD-10-CM

## 2023-08-07 DIAGNOSIS — E66.01 MORBID (SEVERE) OBESITY DUE TO EXCESS CALORIES: Primary | ICD-10-CM

## 2023-08-07 PROCEDURE — 99213 OFFICE O/P EST LOW 20 MIN: CPT | Performed by: NURSE PRACTITIONER

## 2023-08-07 RX ORDER — APIXABAN 5 MG/1
5 TABLET, FILM COATED ORAL EVERY 12 HOURS SCHEDULED
COMMUNITY
Start: 2023-08-07

## 2023-08-07 RX ORDER — ZOLPIDEM TARTRATE 10 MG/1
TABLET ORAL
Qty: 1 TABLET | Refills: 0 | Status: SHIPPED | OUTPATIENT
Start: 2023-08-07

## 2023-08-07 NOTE — PROGRESS NOTES
Chief Complaint:   Chief Complaint   Patient presents with    Insomnia    Fatigue    Daytime Sleepiness    Dry Mouth     New Pt    Unable To Fall Asleep    Unable To Stay Asleep    Sleep Apnea     Dx w sleep apnea appr. six years ago.        HPI:    Katharine Lantigua is a 63 y.o. male here establish care he does see Dr. Ernestine Chandler and Darrion Melgar.  History as below:  Past Medical History:   Diagnosis Date    Abnormal adenosine sestamibi study     Anxiety     Ascending aortic aneurysm 11/03/2022    4.7cm    BCC (basal cell carcinoma of skin) 2018    nose    Depression     Dr. Peterson    Echocardiogram abnormal 04/26/2021    Dr Melgar - EF 60%, mild LVH, mild MR, TR, moderately dialted LA    Enlargement of abdominal aorta 2021    Dr. Melgar    Fatty liver 2016    by CT and US    H/O cardiovascular stress test 04/2010    negative    H/O colonoscopy 12/2014    Dr. Olivier- neg. repeat in 3-5 yrs, previous h/o adenomatous polyp '12 & '13    Heart disease     History of diagnostic ultrasound 04/2016    fatty liver, multiple hypodense lesions in liver, GB nml, CT liver recommended    Hyperlipidemia     Hypertension     Hypothyroidism     Knee pain, right     Multiple hemangiomas 05/01/2016    4 hemangiomas in liver - liver protocol CT at . 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 - Dr. Amin    Osteoarthritis     Paroxysmal atrial fibrillation 09/2015    Screening PSA (prostate specific antigen) 04/2016    0.4; 2/15 0.4; 1/14 0.4    Skin cancer of face      For the past 5 years patient has complained of snoring, witnessed apneas, daytime sleepiness, frequent awakenings, nonrestorative sleep, dry mouth, frequent nighttime urination, difficulty falling and staying asleep.  Patient was diagnosed approximately 6 to 7 years ago with mild to moderate sleep apnea.  Patient did try to get comfortable with CPAP therapy but was  unable to tolerate.  Patient states his symptoms are now worsening he is finding a very hard time resting and getting over 3 to 4 hours of fragmented sleep nightly.  Patient is also currently try to lose weight that he could possibly be a candidate for the inspire.  Patient does deny nasal fracture.  He has no report of hypnagogue hallucinations or sleep paralysis.    Patient during the week will go to bed at 2 AM and awaken 11 AM.  Weekend going to bed at 4 AM and awakening at noon.  Will take him up to 2 hours to get to sleep and as above getting 3 to 4 hours of fragmented sleep.  He does take a 30-minute nap daily.  He is not rested upon awakening.  Patient has an New Freedom score of 9/24.  We did speak today about some things that can help closer to bedtime as limit alcohol, limit to blue screen time, no cell phone use.    We did speak today about the consequences of untreated sleep apnea such as hypertension, atrial fibrillation, stroke, dementia and sudden cardiac death.  Patient verbalizes understanding.    Social history  This very pleasant 63-year-old male.  Patient is an .  Patient is a non-smoker and does drink alcohol 2-3 times a week.  He will drink 2 regular cups of coffee and 3 decaf cups of coffee daily.  He will drink 1 regular soda and 3 decaf sodas daily.  Family History   Problem Relation Age of Onset    Hypothyroidism Mother          in 80's    Cancer Mother     Stroke Mother     Cancer Father         lung, smoker    Diabetes type II Father     Hypertension Father     Coronary artery disease Father         60;s    Deep vein thrombosis Father     Obesity Other     Coronary artery disease Other      Past Surgical History:   Procedure Laterality Date    ANAL FISTULA REPAIR       &     CARDIAC ABLATION      COLONOSCOPY      CYSTOSCOPY  2022    [place]    JOINT REPLACEMENT Right     MOHS SURGERY Left 2018    left ala - BCC    SEPTOPLASTY      ARCENIO  2015    ARCENIO/external  cardioversion into normal sinus rhythm with initiation of flecainide, 08/18/2015.  EF 55% to 60%.    TONSILLECTOMY      TOTAL HIP ARTHROPLASTY Right 1999    Right total hip replacement. (h/o childhood fracture)    TOTAL HIP ARTHROPLASTY Left 11/06/2019    Dr Allen    VENOUS ABLATION  110/02/2015    Status post cryoablation of the pulmonary veins, 11/02/2015, with maintenance of sinus rhythm on no antiarrhythmic.             Current medications are:   Current Outpatient Medications:     Cholecalciferol (Vitamin D3) 1.25 MG (72428 UT) capsule, Take 1 capsule by mouth Every 7 (Seven) Days., Disp: 4 capsule, Rfl: 2    Eliquis 5 MG tablet tablet, Take 1 tablet by mouth Every 12 (Twelve) Hours., Disp: , Rfl:     levothyroxine (SYNTHROID, LEVOTHROID) 100 MCG tablet, TAKE 1 TABLET BY MOUTH DAILY, Disp: 90 tablet, Rfl: 2    losartan (Cozaar) 100 MG tablet, Take 1 tablet by mouth Daily., Disp: 30 tablet, Rfl: 5    metoprolol tartrate (LOPRESSOR) 25 MG tablet, TAKE 1 TABLET BY MOUTH TWICE DAILY, Disp: 180 tablet, Rfl: 1    Naproxen Sodium 220 MG capsule, Take 2 capsules by mouth 2 (Two) Times a Day., Disp: , Rfl:     omeprazole (priLOSEC) 40 MG capsule, 1 bid, Disp: 60 capsule, Rfl: 11    rosuvastatin (CRESTOR) 10 MG tablet, Take 1 tablet by mouth Daily., Disp: 90 tablet, Rfl: 3    zolpidem (Ambien) 10 MG tablet, 1 p.o. at bedtime on the night of study, Disp: 1 tablet, Rfl: 0.      The patient's relevant past medical, surgical, family and social history were reviewed and updated in Epic as appropriate.       Review of Systems   Constitutional:  Positive for fatigue.   HENT:  Positive for congestion, hearing loss, sinus pressure, tinnitus and voice change.    Respiratory:  Positive for apnea and wheezing.    Gastrointestinal:         Heartburn   Genitourinary:  Positive for difficulty urinating, frequency and penile discharge.   Neurological:  Positive for numbness.   Psychiatric/Behavioral:  Positive for decreased  "concentration and sleep disturbance. The patient is nervous/anxious.    All other systems reviewed and are negative.      Objective:    Physical Exam  Constitutional:       Appearance: Normal appearance.   HENT:      Head: Normocephalic and atraumatic.      Mouth/Throat:      Mouth: Mucous membranes are moist.      Pharynx: Oropharynx is clear.      Comments: Class 1 airway  Cardiovascular:      Rate and Rhythm: Normal rate and regular rhythm.   Pulmonary:      Effort: Pulmonary effort is normal.      Breath sounds: Normal breath sounds.   Skin:     General: Skin is warm and dry.   Neurological:      Mental Status: He is alert and oriented to person, place, and time.   Psychiatric:         Mood and Affect: Mood normal.         Behavior: Behavior normal.         Thought Content: Thought content normal.         Judgment: Judgment normal.     /88 (BP Location: Left arm, Patient Position: Sitting, Cuff Size: Large Adult)   Pulse 96   Ht 179.3 cm (70.59\")   Wt (!) 144 kg (317 lb)   SpO2 94%   BMI 44.73 kg/mý         ASSESSMENT/PLAN    Diagnoses and all orders for this visit:    1. Morbid (severe) obesity due to excess calories (Primary)  -     Home Sleep Study; Future    2. TACO (obstructive sleep apnea)  -     Home Sleep Study; Future    3. Excessive daytime sleepiness  -     Home Sleep Study; Future    4. Snoring  -     Home Sleep Study; Future    5. Non-restorative sleep  -     Home Sleep Study; Future    6. Psychophysiological insomnia  -     zolpidem (Ambien) 10 MG tablet; 1 p.o. at bedtime on the night of study  Dispense: 1 tablet; Refill: 0        Counseled patient regarding multimodal approach with healthy nutrition, healthy sleep, regular physical activity, social activities, counseling, and medications. Encouraged to practice lateral sleep position. Avoid alcohol and sedatives close to bedtime.    The patient's history and     I have reviewed the results of my evaluation and impression and discussed " my recommendations in detail with the patient.      Signed by  Kathy Dalal, APRN    August 7, 2023      CC: Gail Chandler MD Clark, Erika H., MD

## 2023-08-17 ENCOUNTER — TELEPHONE (OUTPATIENT)
Dept: INTERNAL MEDICINE | Facility: CLINIC | Age: 63
End: 2023-08-17
Payer: COMMERCIAL

## 2023-08-24 RX ORDER — CHOLECALCIFEROL (VITAMIN D3) 1250 MCG
CAPSULE ORAL
Qty: 4 CAPSULE | Refills: 2 | OUTPATIENT
Start: 2023-08-24

## 2023-08-25 NOTE — TELEPHONE ENCOUNTER
Left message on patient voicemail letting him know to switch to over the counter vitamin D3 1000 units daily.

## 2023-09-06 DIAGNOSIS — I10 ESSENTIAL HYPERTENSION: Chronic | ICD-10-CM

## 2023-09-07 RX ORDER — LOSARTAN POTASSIUM 100 MG/1
100 TABLET ORAL DAILY
Qty: 30 TABLET | Refills: 2 | Status: SHIPPED | OUTPATIENT
Start: 2023-09-07

## 2023-11-15 PROBLEM — E55.9 VITAMIN D DEFICIENCY: Chronic | Status: ACTIVE | Noted: 2023-05-12

## 2023-11-18 NOTE — PROGRESS NOTES
"Subjective   Katharinemonisha Lantigua is a 58 y.o. male.     History of Present Illness   Here for f/u on:    Hypothyroid - has gained 30 lbs over last year. He wants to try to cut back on carbs and do the modified fasting. He has not been able to walk as he has had a lot of L hip pain (seeing Dr Mtz) and may need hip replacement    htn - he hasn't checked recently but has bene good when he has had it checked - 117/75 range    A fib - on eliquis and metoprolol - no recurrent episodes since the ablation    TACO - he did see Dr Barajas at  who felt it would be unlikely that he would adhere to CPAP again and recommended an oral appliance - he has been trying to get this but insurance won't cover so has not gotten yet. He plans to call insurance again and see if he is able to get  Poor sleep x years - seeing Dr Zaragoza. He is taking trazodone and melatonin but still sleep is poor. Less nocturia w/ flomax     The following portions of the patient's history were reviewed and updated as appropriate: allergies, current medications, past family history, past medical history, past social history, past surgical history and problem list.    Review of Systems   Constitutional: Positive for activity change (can't walk as much w/ his hip pain), fatigue and unexpected weight gain (30 lbs since 1/18). Negative for unexpected weight loss.   Respiratory: Positive for apnea.    Cardiovascular: Positive for leg swelling (generally well controlled w/ lasix).   Musculoskeletal: Positive for arthralgias (L hip pain - seeing Dr anderson).   Skin:        L leg varicose vein  R ankle mass - probable nerve sheath tumor - seeing  and monitoring   Psychiatric/Behavioral: Positive for sleep disturbance and stress.       Objective    Vitals:    01/14/19 1338   BP: 116/72   BP Location: Right arm   Temp: 97.4 °F (36.3 °C)   TempSrc: Temporal   Weight: 135 kg (297 lb 6.4 oz)   Height: 188 cm (74\")     Physical Exam   Constitutional: He is oriented to " Problem: Chronic Conditions and Co-morbidities  Goal: Patient's chronic conditions and co-morbidity symptoms are monitored and maintained or improved  Outcome: Adequate for Discharge  Flowsheets (Taken 11/17/2023 2040 by Jakob Gaming RN)  Care Plan - Patient's Chronic Conditions and Co-Morbidity Symptoms are Monitored and Maintained or Improved: Monitor and assess patient's chronic conditions and comorbid symptoms for stability, deterioration, or improvement     Problem: Pain  Goal: Verbalizes/displays adequate comfort level or baseline comfort level  Outcome: Adequate for Discharge     Problem: Discharge Planning  Goal: Discharge to home or other facility with appropriate resources  Outcome: Adequate for Discharge  Flowsheets (Taken 11/17/2023 2040 by Jakob Gaming RN)  Discharge to home or other facility with appropriate resources: Identify barriers to discharge with patient and caregiver     Problem: Safety - Adult  Goal: Free from fall injury  Outcome: Adequate for Discharge person, place, and time. He appears well-developed and well-nourished. No distress.   HENT:   Head: Normocephalic and atraumatic.   Eyes: EOM are normal. Pupils are equal, round, and reactive to light.   Neck: Normal range of motion. Neck supple.   Cardiovascular: Normal rate and regular rhythm.   Pulmonary/Chest: Effort normal and breath sounds normal.   Musculoskeletal: He exhibits no edema (trace edema B ankles).   Neurological: He is alert and oriented to person, place, and time. No cranial nerve deficit.   Skin: Skin is warm and dry. No rash noted. He is not diaphoretic.   Psychiatric: He has a normal mood and affect. His behavior is normal. Judgment and thought content normal.          Assessment/Plan   Katharine was seen today for hypothyroidism, hypertension and med refill.    Diagnoses and all orders for this visit:    Acquired hypothyroidism - check today  -     TSH    Paroxysmal atrial fibrillation (CMS/HCC) - has been in NSR since ablation    Essential hypertension - good control  -     CBC & Differential  -     Comprehensive Metabolic Panel  -     Lipid Panel  -     CBC Auto Differential    Need for immunization against influenza  -     Flucelvax Quad=>4Years (0450-5388)    Need for hepatitis A immunization  -     Hepatitis A Vaccine Adult IM    Class 2 severe obesity with serious comorbidity and body mass index (BMI) of 36.0 to 36.9 in adult, unspecified obesity type (CMS/HCC) - we discussed different options. He may go to the [employer] class that is being offered this winter. We also discussed HMR and bariatric surgery. He will think about different options      Ok to cut back on lasix and take qod and see how swelling does    Flu and hep A vaccines today. He has colon scheduled this week

## 2023-11-29 ENCOUNTER — TELEPHONE (OUTPATIENT)
Dept: INTERNAL MEDICINE | Facility: CLINIC | Age: 63
End: 2023-11-29

## 2023-11-29 NOTE — TELEPHONE ENCOUNTER
"    Caller: Katharine Lantigua \"Luz\"    Relationship: Self    Best call back number: [phone]    What orders are you requesting (i.e. lab or imaging): LABS    In what timeframe would the patient need to come in: ASAP/  [id number]    Where will you receive your lab/imaging services: AT OFFICE    Additional notes:       "

## 2023-12-14 ENCOUNTER — CLINICAL SUPPORT (OUTPATIENT)
Dept: INTERNAL MEDICINE | Facility: CLINIC | Age: 63
End: 2023-12-14
Payer: COMMERCIAL

## 2023-12-14 ENCOUNTER — LAB (OUTPATIENT)
Dept: INTERNAL MEDICINE | Facility: CLINIC | Age: 63
End: 2023-12-14
Payer: COMMERCIAL

## 2023-12-14 DIAGNOSIS — R73.09 ELEVATED GLUCOSE: ICD-10-CM

## 2023-12-14 DIAGNOSIS — E03.9 ACQUIRED HYPOTHYROIDISM: Chronic | ICD-10-CM

## 2023-12-14 DIAGNOSIS — E55.9 VITAMIN D DEFICIENCY: Chronic | ICD-10-CM

## 2023-12-14 DIAGNOSIS — Z23 INFLUENZA VACCINE ADMINISTERED: Primary | ICD-10-CM

## 2023-12-14 DIAGNOSIS — E78.00 PURE HYPERCHOLESTEROLEMIA: Chronic | ICD-10-CM

## 2023-12-14 DIAGNOSIS — K76.0 FATTY LIVER: ICD-10-CM

## 2023-12-14 LAB — HBA1C MFR BLD: 5.7 % (ref 4.8–5.6)

## 2023-12-14 PROCEDURE — 80053 COMPREHEN METABOLIC PANEL: CPT | Performed by: INTERNAL MEDICINE

## 2023-12-14 PROCEDURE — 80061 LIPID PANEL: CPT | Performed by: INTERNAL MEDICINE

## 2023-12-14 PROCEDURE — 83036 HEMOGLOBIN GLYCOSYLATED A1C: CPT | Performed by: INTERNAL MEDICINE

## 2023-12-14 PROCEDURE — 84443 ASSAY THYROID STIM HORMONE: CPT | Performed by: INTERNAL MEDICINE

## 2023-12-14 PROCEDURE — 85025 COMPLETE CBC W/AUTO DIFF WBC: CPT | Performed by: INTERNAL MEDICINE

## 2023-12-14 PROCEDURE — 36415 COLL VENOUS BLD VENIPUNCTURE: CPT | Performed by: INTERNAL MEDICINE

## 2023-12-14 PROCEDURE — 82306 VITAMIN D 25 HYDROXY: CPT | Performed by: INTERNAL MEDICINE

## 2023-12-15 LAB
25(OH)D3 SERPL-MCNC: 29 NG/ML (ref 30–100)
ALBUMIN SERPL-MCNC: 4.3 G/DL (ref 3.5–5.2)
ALBUMIN/GLOB SERPL: 1.7 G/DL
ALP SERPL-CCNC: 54 U/L (ref 39–117)
ALT SERPL W P-5'-P-CCNC: 19 U/L (ref 1–41)
ANION GAP SERPL CALCULATED.3IONS-SCNC: 10.4 MMOL/L (ref 5–15)
AST SERPL-CCNC: 18 U/L (ref 1–40)
BASOPHILS # BLD AUTO: 0.04 10*3/MM3 (ref 0–0.2)
BASOPHILS NFR BLD AUTO: 0.5 % (ref 0–1.5)
BILIRUB SERPL-MCNC: 0.3 MG/DL (ref 0–1.2)
BUN SERPL-MCNC: 19 MG/DL (ref 8–23)
BUN/CREAT SERPL: 22.6 (ref 7–25)
CALCIUM SPEC-SCNC: 9.6 MG/DL (ref 8.6–10.5)
CHLORIDE SERPL-SCNC: 108 MMOL/L (ref 98–107)
CHOLEST SERPL-MCNC: 159 MG/DL (ref 0–200)
CO2 SERPL-SCNC: 25.6 MMOL/L (ref 22–29)
CREAT SERPL-MCNC: 0.84 MG/DL (ref 0.76–1.27)
DEPRECATED RDW RBC AUTO: 45.8 FL (ref 37–54)
EGFRCR SERPLBLD CKD-EPI 2021: 97.4 ML/MIN/1.73
EOSINOPHIL # BLD AUTO: 0.11 10*3/MM3 (ref 0–0.4)
EOSINOPHIL NFR BLD AUTO: 1.5 % (ref 0.3–6.2)
ERYTHROCYTE [DISTWIDTH] IN BLOOD BY AUTOMATED COUNT: 13.3 % (ref 12.3–15.4)
GLOBULIN UR ELPH-MCNC: 2.5 GM/DL
GLUCOSE SERPL-MCNC: 97 MG/DL (ref 65–99)
HCT VFR BLD AUTO: 48 % (ref 37.5–51)
HDLC SERPL-MCNC: 63 MG/DL (ref 40–60)
HGB BLD-MCNC: 16.4 G/DL (ref 13–17.7)
IMM GRANULOCYTES # BLD AUTO: 0.01 10*3/MM3 (ref 0–0.05)
IMM GRANULOCYTES NFR BLD AUTO: 0.1 % (ref 0–0.5)
LDLC SERPL CALC-MCNC: 84 MG/DL (ref 0–100)
LDLC/HDLC SERPL: 1.33 {RATIO}
LYMPHOCYTES # BLD AUTO: 1.49 10*3/MM3 (ref 0.7–3.1)
LYMPHOCYTES NFR BLD AUTO: 20.3 % (ref 19.6–45.3)
MCH RBC QN AUTO: 31.7 PG (ref 26.6–33)
MCHC RBC AUTO-ENTMCNC: 34.2 G/DL (ref 31.5–35.7)
MCV RBC AUTO: 92.8 FL (ref 79–97)
MONOCYTES # BLD AUTO: 0.7 10*3/MM3 (ref 0.1–0.9)
MONOCYTES NFR BLD AUTO: 9.5 % (ref 5–12)
NEUTROPHILS NFR BLD AUTO: 5 10*3/MM3 (ref 1.7–7)
NEUTROPHILS NFR BLD AUTO: 68.1 % (ref 42.7–76)
NRBC BLD AUTO-RTO: 0 /100 WBC (ref 0–0.2)
PLATELET # BLD AUTO: 295 10*3/MM3 (ref 140–450)
PMV BLD AUTO: 10.9 FL (ref 6–12)
POTASSIUM SERPL-SCNC: 4.4 MMOL/L (ref 3.5–5.2)
PROT SERPL-MCNC: 6.8 G/DL (ref 6–8.5)
RBC # BLD AUTO: 5.17 10*6/MM3 (ref 4.14–5.8)
SODIUM SERPL-SCNC: 144 MMOL/L (ref 136–145)
TRIGL SERPL-MCNC: 62 MG/DL (ref 0–150)
TSH SERPL DL<=0.05 MIU/L-ACNC: 1.58 UIU/ML (ref 0.27–4.2)
VLDLC SERPL-MCNC: 12 MG/DL (ref 5–40)
WBC NRBC COR # BLD AUTO: 7.35 10*3/MM3 (ref 3.4–10.8)

## 2023-12-20 ENCOUNTER — OFFICE VISIT (OUTPATIENT)
Dept: INTERNAL MEDICINE | Facility: CLINIC | Age: 63
End: 2023-12-20
Payer: COMMERCIAL

## 2023-12-20 VITALS
TEMPERATURE: 97.1 F | OXYGEN SATURATION: 94 % | WEIGHT: 306 LBS | DIASTOLIC BLOOD PRESSURE: 78 MMHG | HEART RATE: 58 BPM | HEIGHT: 71 IN | SYSTOLIC BLOOD PRESSURE: 120 MMHG | BODY MASS INDEX: 42.84 KG/M2

## 2023-12-20 DIAGNOSIS — E03.9 ACQUIRED HYPOTHYROIDISM: Chronic | ICD-10-CM

## 2023-12-20 DIAGNOSIS — E66.01 MORBID OBESITY WITH BMI OF 40.0-44.9, ADULT: ICD-10-CM

## 2023-12-20 DIAGNOSIS — E55.9 VITAMIN D DEFICIENCY: Primary | Chronic | ICD-10-CM

## 2023-12-20 DIAGNOSIS — I10 ESSENTIAL HYPERTENSION: Chronic | ICD-10-CM

## 2023-12-20 DIAGNOSIS — G47.33 OSA (OBSTRUCTIVE SLEEP APNEA): Chronic | ICD-10-CM

## 2023-12-20 PROBLEM — R91.1 LUNG NODULE: Status: ACTIVE | Noted: 2023-12-20

## 2023-12-20 PROCEDURE — 99214 OFFICE O/P EST MOD 30 MIN: CPT | Performed by: INTERNAL MEDICINE

## 2023-12-20 RX ORDER — SEMAGLUTIDE 0.25 MG/.5ML
0.25 INJECTION, SOLUTION SUBCUTANEOUS WEEKLY
COMMUNITY
Start: 2023-11-27

## 2023-12-20 RX ORDER — ACETAMINOPHEN 500 MG
1000 TABLET ORAL 2 TIMES DAILY
COMMUNITY

## 2023-12-20 RX ORDER — EMPAGLIFLOZIN 10 MG/1
10 TABLET, FILM COATED ORAL DAILY
COMMUNITY

## 2023-12-20 RX ORDER — LOSARTAN POTASSIUM 100 MG/1
100 TABLET ORAL DAILY
Qty: 90 TABLET | Refills: 1 | Status: SHIPPED | OUTPATIENT
Start: 2023-12-20

## 2023-12-20 NOTE — PROGRESS NOTES
Chief Complaint  Hypertension, Hypothyroidism, Med Refill, Vitamin D Deficiency (F/u), Upper Extremity Issue (He is having trouble with fine motor skills with his fingers.  He is seeing a neurologist.), Back Pain (He is finally having a back surgery and wants to know about a recommendation for a second opinion.  Seeing Dr. Leroy Vaughn now.), and skin issue (Discuss and he is seeing Dr. Sams later today.  Discuss why oxybutynin was stopped.)    Subjective          Katharine Lantigua presents to [facility]  History of Present Illness    Hypothyroid-TSH done 12/14 was normal at 1.5     Vitamin D deficiency-vitamin D level was 9 earlier this year and we started high-dose vitamin D and it was rechecked in 5/23 and was up to 27.  We rechecked this month vitamin D level is 29.  He is taking vitamin D every day but is not sure how much.  He is also on a B complex     Morbid obesity- we had sent in Wevy earlier this year, but he never started.  Just recently, he did  the prescription but has not started yet.  He does want to try it.  He has already started working on healthier diet and has lost 11 pounds since visit in August    Hyperlipidemia-he is on Crestor 10.  Last FLP was 12/14 and helped his 84 and HDL was 63     TACO-he did see Dr. Amin several years ago but has not been able to tolerate CPAP. He saw Kathy Dalal in 8/23 and sleep study was ordered but pt has not had yet.  He has also seen [facility] sleep medicine.  He states the thought is to try to lose weight and see if he could get a inspire device as he does not feel like he could tolerate any masks      A. Fib s/p ablation-patient is on Eliquis; he is on metoprolol. He sees Dr Melgar regularly     Ascending aortic aneurysm - had been 4.7-4.9cm and repeat done at  on 9/23 and was 5cm.  He believes that we will be rechecking in 6 months     JEWELL lung nodule - seen incidentally on the CT 9/23 at  and noncontrast CT  recommneded in 3/24       R knee pain and effusion- he saw Dr Cho in 8/22 and lymes and rhem testing was negative.        Neck pain and LBP - he sees [facility] neurosurgery, and did see them again this month and will have repeat imaging done of both neck and lower back. He will do MRIs soon -he is in the process of getting him scheduled as open MRIs   both hands have had some trouble w/ fine motor skills - he had nerve conduction test with Dr. Matteo Melgar and B12 level look good when we checked earlier this year and pt thinks it was not carpal tunnel  Has f/u in 1/24.    Lung nodule-on his CT scan on 9/23 at  there was a 7 mm nodule in the JEWELL.  6-month follow-up recommended    B12 def - he takes B complex, and B12 level look good when we checked earlier this year    He is on jardiance through Dr Darrion Melgar    BPH-patient had been on oxybutynin but in May he was complaining of extremely dry mouth and we did have him hold the oxybutynin.  He does see  and will discuss further with him.      Current Outpatient Medications:     acetaminophen (TYLENOL) 500 MG tablet, Take 2 tablets by mouth 2 (Two) Times a Day., Disp: , Rfl:     Eliquis 5 MG tablet tablet, Take 1 tablet by mouth Every 12 (Twelve) Hours., Disp: , Rfl:     Jardiance 10 MG tablet tablet, Take 1 tablet by mouth Daily., Disp: , Rfl:     levothyroxine (SYNTHROID, LEVOTHROID) 100 MCG tablet, TAKE 1 TABLET BY MOUTH DAILY, Disp: 90 tablet, Rfl: 2    losartan (COZAAR) 100 MG tablet, TAKE 1 TABLET BY MOUTH DAILY, Disp: 30 tablet, Rfl: 2    metoprolol tartrate (LOPRESSOR) 25 MG tablet, TAKE 1 TABLET BY MOUTH TWICE DAILY, Disp: 180 tablet, Rfl: 1    omeprazole (priLOSEC) 40 MG capsule, 1 bid, Disp: 60 capsule, Rfl: 11    rosuvastatin (CRESTOR) 10 MG tablet, Take 1 tablet by mouth Daily., Disp: 90 tablet, Rfl: 3    VITAMIN D, CHOLECALCIFEROL, PO, Take  by mouth., Disp: , Rfl:     Wegovy 0.25 MG/0.5ML solution auto-injector, 0.25 mL 1 (One)  "Time Per Week. Will start in January, Disp: , Rfl:     Cholecalciferol (Vitamin D3) 1.25 MG (90062 UT) capsule, Take 1 capsule by mouth Every 7 (Seven) Days. (Patient not taking: Reported on 12/20/2023), Disp: 4 capsule, Rfl: 2    Naproxen Sodium 220 MG capsule, Take 2 capsules by mouth 2 (Two) Times a Day. (Patient not taking: Reported on 12/20/2023), Disp: , Rfl:     zolpidem (Ambien) 10 MG tablet, 1 p.o. at bedtime on the night of study (Patient not taking: Reported on 12/20/2023), Disp: 1 tablet, Rfl: 0   The following portions of the patient's history were reviewed and updated as appropriate: allergies, current medications, past family history, past medical history, past social history, past surgical history and problem list.     Objective   Vital Signs:   /78 (BP Location: Left arm, Patient Position: Sitting)   Pulse 58   Temp 97.1 °F (36.2 °C) (Infrared)   Ht 179.3 cm (70.6\")   Wt (!) 139 kg (306 lb)   SpO2 94%   BMI 43.16 kg/m²       Physical exam  Constitutional: oriented to person, place, and time.  well-developed and well-nourished. No distress.   HENT:   Head: Normocephalic and atraumatic.   Eyes: Conjunctivae and EOM are normal.   Cardiovascular: Normal rate, regular rhythm and normal heart sounds.  Exam reveals no gallop and no friction rub.    No murmur heard.  Pulmonary/Chest: Effort normal and breath sounds normal. No respiratory distress.   no wheezes.   Neurological:  alert and oriented to person, place, and time.   Skin: Skin is warm and dry. not diaphoretic.   Psychiatric:  normal mood and affect. behavior is normal. Judgment and thought content normal.      Result Review :   The following data was reviewed by: Gail Chandler MD on 12/20/2023:  Common labs          3/7/2023    16:01 9/14/2023    18:48 12/14/2023    15:53   Common Labs   Glucose 101   97    BUN 16   19    Creatinine 0.92   0.84    EGFR  Am  95        Sodium 138   144    Potassium 4.3   4.4    Chloride 102   " 108    Calcium 9.1   9.6    Albumin 4.2   4.3    Total Bilirubin 0.6   0.3    Alkaline Phosphatase 87   54    AST (SGOT) 15   18    ALT (SGPT) 19   19    WBC 7.66   7.35    Hemoglobin 15.1   16.4    Hematocrit 44.5   48.0    Platelets 296   295    Total Cholesterol 146   159    Triglycerides 78   62    HDL Cholesterol 62   63    LDL Cholesterol  69   84    Hemoglobin A1C 5.70   5.70    PSA 1.230         Details          This result is from an external source.             Lipid Panel          3/7/2023    16:01 12/14/2023    15:53   Lipid Panel   Total Cholesterol 146  159    Triglycerides 78  62    HDL Cholesterol 62  63    VLDL Cholesterol 15  12    LDL Cholesterol  69  84    LDL/HDL Ratio 1.10  1.33      TSH          3/7/2023    16:01 12/14/2023    15:53   TSH   TSH 3.050  1.580      Lab Results   Component Value Date    XAZW58NS 29.0 (L) 12/14/2023    DNQSWARF32 666 03/07/2023    URICACID 4.5 08/11/2022               Assessment and Plan    Diagnoses and all orders for this visit:    1. Vitamin D deficiency (Primary)-he will double up on his current dose of vitamin D to get the level over 30    2. Morbid obesity with BMI of 40-45 adult-patient is trying to eat healthier and will start the Wegovy.  Discussed side effects of Wegovy and he will call when he needs a refill    3. Acquired hypothyroidism-TSH looked good.  We can recheck in 6 to 12 months    4. TACO (obstructive sleep apnea)-he is trying to lose weight and hopes to be able to get inspire    5. Essential hypertension-good control        Follow Up   Return in about 6 months (around 6/20/2024) for Annual.  Patient was given instructions and counseling regarding his condition or for health maintenance advice. Please see specific information pulled into the AVS if appropriate.

## 2023-12-26 RX ORDER — SEMAGLUTIDE 0.25 MG/.5ML
INJECTION, SOLUTION SUBCUTANEOUS
Qty: 2 ML | OUTPATIENT
Start: 2023-12-26

## 2023-12-28 RX ORDER — SEMAGLUTIDE 0.5 MG/.5ML
2 INJECTION, SOLUTION SUBCUTANEOUS WEEKLY
Qty: 2 ML | Refills: 0 | Status: SHIPPED | OUTPATIENT
Start: 2023-12-28

## 2023-12-28 NOTE — TELEPHONE ENCOUNTER
Spoke with patient again today and he states he needs a refill on Wgovy the next MG you would like him on, patient stated you told him to take the starter dose then titrate up , please advise

## 2024-01-05 RX ORDER — SEMAGLUTIDE 0.25 MG/.5ML
0.25 INJECTION, SOLUTION SUBCUTANEOUS WEEKLY
OUTPATIENT
Start: 2024-01-05

## 2024-03-04 RX ORDER — LEVOTHYROXINE SODIUM 0.1 MG/1
100 TABLET ORAL DAILY
Qty: 90 TABLET | Refills: 2 | Status: SHIPPED | OUTPATIENT
Start: 2024-03-04

## 2024-03-04 NOTE — TELEPHONE ENCOUNTER
Last seen 12/20. Last filled 6/14 for 90 day with 2 refill.  Next appointment 6/20.  Last labs 12/14/23

## 2024-03-25 ENCOUNTER — TELEPHONE (OUTPATIENT)
Dept: INTERNAL MEDICINE | Facility: CLINIC | Age: 64
End: 2024-03-25

## 2024-03-25 NOTE — TELEPHONE ENCOUNTER
----- Message from Gail Chandler MD sent at 3/25/2024  7:57 AM EDT -----  Regarding: repeat CT chest  He had the CT chest at  in September that Dr Melgar had ordered, and a 6 month followup CT was recommended - does he have this scheduled yet? If not, I can go ahead and put in order for it if he is OK w/ that

## 2024-03-27 NOTE — TELEPHONE ENCOUNTER
Patient states he would like for Dr. Chandler to order the CT angiogram at .  He would also like a new script for the Wegovy starting dose again.  His last injection was in November.  He was not able to  the 0.5 dose.

## 2024-03-27 NOTE — TELEPHONE ENCOUNTER
Left message on voicemail for patient. I asked him to call the office back to let Dr. Chandler know about the CT.

## 2024-03-28 DIAGNOSIS — R91.1 INCIDENTAL LUNG NODULE, > 3MM AND < 8MM: Primary | ICD-10-CM

## 2024-03-28 RX ORDER — SEMAGLUTIDE 0.25 MG/.5ML
0.25 INJECTION, SOLUTION SUBCUTANEOUS WEEKLY
Qty: 2 ML | Refills: 0 | Status: SHIPPED | OUTPATIENT
Start: 2024-03-28

## 2024-03-29 ENCOUNTER — TELEPHONE (OUTPATIENT)
Dept: INTERNAL MEDICINE | Facility: CLINIC | Age: 64
End: 2024-03-29
Payer: COMMERCIAL

## 2024-04-01 DIAGNOSIS — R49.0 HOARSENESS: ICD-10-CM

## 2024-04-01 DIAGNOSIS — K21.9 GASTROESOPHAGEAL REFLUX DISEASE WITHOUT ESOPHAGITIS: ICD-10-CM

## 2024-04-01 RX ORDER — OMEPRAZOLE 40 MG/1
CAPSULE, DELAYED RELEASE ORAL
Qty: 60 CAPSULE | Refills: 4 | Status: SHIPPED | OUTPATIENT
Start: 2024-04-01 | End: 2024-04-01

## 2024-04-01 NOTE — TELEPHONE ENCOUNTER
Rx Refill Note  Requested Prescriptions     Pending Prescriptions Disp Refills    omeprazole (priLOSEC) 40 MG capsule [Pharmacy Med Name: OMEPRAZOLE 40MG CAPSULES] 60 capsule 11     Sig: TAKE 1 CAPSULE BY MOUTH TWICE DAILY      Last office visit with prescribing clinician: 12/20/2023     Next office visit with prescribing clinician: 6/20/2024       Rebecca Rutherford  04/01/24, 13:21 EDT

## 2024-04-02 RX ORDER — OMEPRAZOLE 40 MG/1
CAPSULE, DELAYED RELEASE ORAL
Qty: 60 CAPSULE | Refills: 4 | Status: SHIPPED | OUTPATIENT
Start: 2024-04-02

## 2024-04-19 ENCOUNTER — TELEPHONE (OUTPATIENT)
Dept: INTERNAL MEDICINE | Facility: CLINIC | Age: 64
End: 2024-04-19

## 2024-04-19 NOTE — TELEPHONE ENCOUNTER
"  Caller: Katharine Lantigua \"Luz\"    Relationship: Self    Best call back number: [phone]     What is the best time to reach you: ANY    Who are you requesting to speak with (clinical staff, provider,  specific staff member): DR. KNAPP OR HER NURSE    What was the call regarding: THE PATIENT IS WANTING TO KNOW WHY A CT OF HIS CHEST WAS ORDERED AT . HE DOES NOT REMEMBER DISCUSSING THIS WITH DR. KNAPP. CAN SOMEONE CALL HIM AND LET HIM KNOW SO CAN KNOW WHAT HE IS SCHEDULING FOR?  PLEASE CALL HIM ASAP AS  CALLED HIM ALREADY TO TRY TO SCHEDULE.     Is it okay if the provider responds through [employer]: NO  "

## 2024-04-19 NOTE — TELEPHONE ENCOUNTER
Spoke with pt and informed him that this was a 6 month follow up CT scan from the one completed in 9/2023. Pt states they have scheduled him for October. Pt would like to have this order expedited if possible.

## 2024-05-17 ENCOUNTER — TELEPHONE (OUTPATIENT)
Dept: INTERNAL MEDICINE | Facility: CLINIC | Age: 64
End: 2024-05-17
Payer: COMMERCIAL

## 2024-05-17 NOTE — TELEPHONE ENCOUNTER
"Caller: Katharine Lantigua W \"Luz\"    Relationship: Self    Best call back number: [phone]     What was the call regarding: PATIENT STATES HIS CARDIOLOGIST WANTED HIM TO GET A CT ANGIOGRAM OF THE THORACIC AORTA AND DR. KNAPP PUT IN AN ORDER FOR A CT SCAN OF THE CHEST WITHOUT CONTRAST AND HE WOULD LIKE TO KNOW IF THAT IS THE SAME THING OR DOES HE NEED A DIFFERENT ORDER PLACED.     HE HAS AN APPOINTMENT FOR 5/21/24 FOR THE CT SCAN OF CHEST WITHOUT CONTRAST AND WOULD LIKE TO KNOW IF HE NEEDS TO CANCEL THAT.           "

## 2024-05-20 NOTE — TELEPHONE ENCOUNTER
HUB to relay:  They are slightly different tests.  But it is okay if he wants to cancel the test we ordered,  if his cardiologist is going to order the CTA, because the CTA last year is the test that did show the pulmonary nodule so I think the radiologist will be able to compare the pulmonary nodule to his last year's test if he has another CTA this year

## 2024-05-22 NOTE — TELEPHONE ENCOUNTER
"Name: Katharine Lantigua W \"Lzu\"    Relationship: Self    Best Callback Number: [phone]      HUB PROVIDED THE RELAY MESSAGE FROM THE OFFICE AND THIS CAUSED FURTHER CONFUSION.    HE STATED THAT THE ORIGINAL MESSAGE HAD NO RELATION TO THE PULMONARY NODULE.     PATIENT IS WONDERING IF AT THIS POINT THAT DR KNAPP AND HIS CARDIOLOGIST ARE NEEDING TO COMMUNICATE ABOUT WHICH TEST HE SHOULD BE HAVING DONE AND WHEN.    HE STATED THAT HE WAS SUPPOSED TO HAVE A CT SCAN DONE YESTERDAY 5/21/24, BUT IT WAS CANCELLED.     HE STATED THAT HIS CARDIOLOGIST WAS UNDER THE IMPRESSION THAT DR KNAPP WAS TO FACILITATE ORDERING THE CTA.     THIS TEST IS TO SEE IF HIS AORTA IS GROWING AND WAS SUPPOSED TO HAVE BEEN DONE WITHIN 5 MONTHS OF THE LAST CTA.    HE STATED HIS INTENTION TO CONTACT HIS CARDIOLOGIST TO SEE IF THEY CAN COMMUNICATE WITH DR KNAPP FOR FURTHER CLARIFICATION ON THESE QUESTIONS.   "

## 2024-05-29 DIAGNOSIS — E66.01 MORBID OBESITY WITH BMI OF 45.0-49.9, ADULT: Chronic | ICD-10-CM

## 2024-05-29 DIAGNOSIS — I71.21 ANEURYSM OF ASCENDING AORTA WITHOUT RUPTURE: ICD-10-CM

## 2024-05-29 DIAGNOSIS — R91.1 LUNG NODULE: Primary | ICD-10-CM

## 2024-05-29 RX ORDER — SEMAGLUTIDE 0.5 MG/.5ML
0.5 INJECTION, SOLUTION SUBCUTANEOUS WEEKLY
Qty: 2 ML | Refills: 0 | Status: SHIPPED | OUTPATIENT
Start: 2024-05-29

## 2024-05-29 NOTE — TELEPHONE ENCOUNTER
Called and spoke with patient, informed him of Dr. Chandler's message. He verbalized understanding and had no further questions.

## 2024-06-17 NOTE — PROGRESS NOTES
History of Present Illness   Here for a physical    Exercise: none currently but hopes to start walking in the pool this summer  Diet: not great, not eating as much but still not very healthy choices.  ETOH - has cut back - 6-8/week      Possible Parkinsons- he is seeing Dr Matteo Melgar - he was prescirbed Pramipexole but has not started; patient does have slow movement, tremor in his hands; also has some cervical stenosis    Hypothyroid-TSH done 12/23 was normal at 1.5     Vitamin D deficiency-vitamin D level was 9  last year and we started high-dose vitamin D and it was rechecked in 12/23 and was  29.  He was taking vitamin D but not on currentlyMorbid obesity- he did  Wegovy in 4/24     Hyperlipidemia-he is on Crestor 10.  Last FLP was 12/23, LDL was 84 and HDL was 63      TACO-he did see Dr. Amin several years ago but has not been able to tolerate CPAP. He saw Kathy Dalal in 8/23 and sleep study was ordered but patient did not do.  He does not feel like he would be able to tolerate any of the CPAP masks.  He hopes to lose enough weight to do the inspire procedure      A. Fib s/p ablation-Dr Darrion Melgar stopped the  ELiquis; he is on metoprolol.  Jardiance has also been started by Dr. Melgar     Ascending aortic aneurysm - had been 4.7-4.9cm and repeat done at  on 9/23 and was 5cm.  He does have the CTA scheduled for next week at      JEWELL lung nodule - seen incidentally on the CT 9/23 at  and CTA is scheduled for next week.  He was never a smoker       Cervical stenosis-patient sees [facility] neurosurgery and pain management     Neck pain and LBP - [facility] have had some trouble w/ fine motor skills - he had nerve conduction test with Dr. Matteo Melgar and B12 level look good when we checked earlier this year and pt thinks it was not carpal tunnel  He does also get some shooting pains in the right foot.  He wonders if he might have a neuropathy      BPH-patient sees ; last visit was 12/23.  PSA order mentioned in note but I cannot pull up the lab and patient is not sure if he had it done    Low back pain- had  rhizotomy done earlier this week with Dr. Astudillo    Depression - he has felt down dealing w/ chronic pain and poor sleep.  There is a lot of stress taking care of his wife who has had to be hospitalized multiple times       Skin lesions-has a scaly spot on left cheek and nodule on right shin-both have been there for several years.  He has had some skin cancers in the past but has not seen dermatology in a long time                   Current Outpatient Medications on File Prior to Visit   Medication Sig Dispense Refill    acetaminophen (TYLENOL) 500 MG tablet Take 2 tablets by mouth 2 (Two) Times a Day.      Jardiance 10 MG tablet tablet Take 1 tablet by mouth Daily.      levothyroxine (SYNTHROID, LEVOTHROID) 100 MCG tablet TAKE 1 TABLET BY MOUTH DAILY 90 tablet 2    losartan (COZAAR) 100 MG tablet Take 1 tablet by mouth Daily. 90 tablet 1    metoprolol tartrate (LOPRESSOR) 25 MG tablet Take 1 tablet by mouth 2 (Two) Times a Day. 180 tablet 1    omeprazole (priLOSEC) 40 MG capsule TAKE 1 CAPSULE BY MOUTH TWICE DAILY 60 capsule 4    pramipexole (MIRAPEX) 0.5 MG tablet Take 1 tablet by mouth 3 (Three) Times a Day.      rosuvastatin (CRESTOR) 10 MG tablet Take 1 tablet by mouth Daily. 90 tablet 3    [DISCONTINUED] Semaglutide-Weight Management (Wegovy) 0.5 MG/0.5ML solution auto-injector Inject 0.5 mL under the skin into the appropriate area as directed 1 (One) Time Per Week. 2 mL 0    [DISCONTINUED] Cholecalciferol (Vitamin D3) 1.25 MG (90046 UT) capsule Take 1 capsule by mouth Every 7 (Seven) Days. (Patient not taking: Reported on 12/20/2023) 4 capsule 2    [DISCONTINUED] Eliquis 5 MG tablet tablet Take 1 tablet by mouth Every 12 (Twelve) Hours. (Patient not taking: Reported on 6/20/2024)      [DISCONTINUED] Naproxen Sodium 220 MG capsule Take 2  "capsules by mouth 2 (Two) Times a Day. (Patient not taking: Reported on 12/20/2023)      [DISCONTINUED] VITAMIN D, CHOLECALCIFEROL, PO Take  by mouth. (Patient not taking: Reported on 6/20/2024)      [DISCONTINUED] zolpidem (Ambien) 10 MG tablet 1 p.o. at bedtime on the night of study (Patient not taking: Reported on 12/20/2023) 1 tablet 0     No current facility-administered medications on file prior to visit.       The following portions of the patient's history were reviewed and updated as appropriate: allergies, current medications, past family history, past medical history, past social history, past surgical history and problem list.    Review of Systems   Constitutional:  Positive for appetite change (decreased w/ wegovy) and unexpected weight loss (trying to lose, on wegovy). Negative for activity change, fever and unexpected weight gain.   HENT: Negative.     Eyes: Negative.    Respiratory:  Negative for shortness of breath and wheezing.    Cardiovascular:  Negative for chest pain, palpitations and leg swelling.   Gastrointestinal:  Positive for abdominal distention (when constipated) and constipation. Negative for diarrhea.   Endocrine: Negative.    Genitourinary:  Negative for difficulty urinating and dysuria.   Musculoskeletal:  Positive for back pain, gait problem and neck pain.   Skin:  Positive for skin lesions.   Allergic/Immunologic: Negative for immunocompromised state.   Neurological:  Negative for seizures, speech difficulty, memory problem and confusion.   Hematological:  Does not bruise/bleed easily.   Psychiatric/Behavioral:  Positive for dysphoric mood, sleep disturbance, depressed mood and stress. Negative for agitation.          Objective   /90   Pulse 74   Temp 98.2 °F (36.8 °C) (Infrared)   Resp 14   Ht 179.3 cm (70.59\")   Wt 129 kg (284 lb)   SpO2 94%   BMI 40.07 kg/m²   Physical Exam   Physical Exam  Vitals and nursing note reviewed.   Constitutional:       General: He is " not in acute distress.     Appearance: He is well-developed. He is obese. He is ill-appearing. He is not diaphoretic.   HENT:      Head: Normocephalic and atraumatic.      Right Ear: External ear normal.      Left Ear: External ear normal.      Nose: Nose normal.      Mouth/Throat:      Pharynx: No oropharyngeal exudate.   Eyes:      General: No scleral icterus.        Right eye: No discharge.         Left eye: No discharge.      Conjunctiva/sclera: Conjunctivae normal.      Pupils: Pupils are equal, round, and reactive to light.   Neck:      Thyroid: No thyromegaly.   Cardiovascular:      Rate and Rhythm: Normal rate and regular rhythm.      Heart sounds: Normal heart sounds. No murmur heard.  Pulmonary:      Effort: Pulmonary effort is normal. No respiratory distress.      Breath sounds: Normal breath sounds. No stridor. No wheezing or rales.   Abdominal:      General: Bowel sounds are normal. There is no distension.      Palpations: Abdomen is soft. There is no mass.      Tenderness: There is no abdominal tenderness. There is no guarding or rebound.   Musculoskeletal:         General: No deformity. Normal range of motion.      Cervical back: Normal range of motion and neck supple.   Lymphadenopathy:      Cervical: No cervical adenopathy.   Skin:     General: Skin is warm and dry.      Coloration: Skin is not pale.      Findings: Lesion (scaly patch on l cheek, nodule w/ scale on R shin) present. No erythema or rash.   Neurological:      Mental Status: He is alert and oriented to person, place, and time.      Coordination: Coordination normal.      Deep Tendon Reflexes: Reflexes normal.      Comments: Slow gait, bilateral hand tremor   Psychiatric:         Mood and Affect: Mood normal.         Behavior: Behavior normal.         Thought Content: Thought content normal.         Judgment: Judgment normal.           Assessment/Plan   Diagnoses and all orders for this visit:    1. Routine general medical examination at  a health care facility (Primary)  -     POC Urinalysis Dipstick, Automated  Regular exercise, healthy diet. Sunscreen use encouraged  DT due7/24-give today  RSV -antony getting at the pharmacy  Colon due  this year w/ Dr Ahn, 5 yr f/u, h/o colon polyps-we will schedule  Shingles - he had shingrex   Prevnar 20 today since he is 64  Prostate screening - sees Urology but not clear if he had a PSA in December so we will go ahead and do it with the blood work from the physical  Would add baby asa daily (coronary calcifications)  2. Screen for colon cancer  -     Ambulatory Referral For Screening Colonoscopy    3. Need for pneumococcal 20-valent conjugate vaccination  -     Pneumococcal Conjugate Vaccine 20-Valent (PCV20)    4. Need for Tdap vaccination  -     Tdap Vaccine => 8yo IM (BOOSTRIX/ADACEL)    5. Drug-induced constipation-constipation likely from the Wegovy.  We will try the Linzess samples.  I gave him 4 weeks of samples and he will let me know how he does with them  -     linaclotide (Linzess) 72 MCG capsule capsule; Take 1 capsule by mouth Every Morning Before Breakfast.  Dispense: 30 capsule; Refill: 0    6. Morbid obesity with BMI of 40.0-44.9, adult-we will go up to the 1 mg on the Wegovy.  Call if any problems.  Try to improve diet.  He will try to start walking in the pool this summer as well  -     Semaglutide-Weight Management (Wegovy) 1 MG/0.5ML solution auto-injector; Inject 0.5 mL under the skin into the appropriate area as directed 1 (One) Time Per Week.  Dispense: 2 mL; Refill: 0    7. Vitamin D deficiency-recheck     8. Elevated glucose-we will check A1c    9. Prostate cancer screening-check PSA      10.  Thoracic aneurysm, lung nodule-she is scheduled next week for CTA which should look at both of these    11.  Depression-did discuss Cymbalta with patient today as this might help some with his chronic pain.  He will think about it and let me know if he would like to try it.

## 2024-06-20 ENCOUNTER — OFFICE VISIT (OUTPATIENT)
Dept: INTERNAL MEDICINE | Facility: CLINIC | Age: 64
End: 2024-06-20
Payer: COMMERCIAL

## 2024-06-20 VITALS
HEART RATE: 74 BPM | HEIGHT: 71 IN | SYSTOLIC BLOOD PRESSURE: 132 MMHG | DIASTOLIC BLOOD PRESSURE: 90 MMHG | TEMPERATURE: 98.2 F | RESPIRATION RATE: 14 BRPM | OXYGEN SATURATION: 94 % | BODY MASS INDEX: 39.76 KG/M2 | WEIGHT: 284 LBS

## 2024-06-20 DIAGNOSIS — E66.01 MORBID OBESITY WITH BMI OF 40.0-44.9, ADULT: ICD-10-CM

## 2024-06-20 DIAGNOSIS — R73.09 ELEVATED GLUCOSE: ICD-10-CM

## 2024-06-20 DIAGNOSIS — Z12.11 SCREEN FOR COLON CANCER: ICD-10-CM

## 2024-06-20 DIAGNOSIS — Z23 NEED FOR PNEUMOCOCCAL 20-VALENT CONJUGATE VACCINATION: ICD-10-CM

## 2024-06-20 DIAGNOSIS — Z00.00 ROUTINE GENERAL MEDICAL EXAMINATION AT A HEALTH CARE FACILITY: Primary | ICD-10-CM

## 2024-06-20 DIAGNOSIS — Z12.5 PROSTATE CANCER SCREENING: ICD-10-CM

## 2024-06-20 DIAGNOSIS — K59.03 DRUG-INDUCED CONSTIPATION: ICD-10-CM

## 2024-06-20 DIAGNOSIS — E55.9 VITAMIN D DEFICIENCY: ICD-10-CM

## 2024-06-20 DIAGNOSIS — Z23 NEED FOR TDAP VACCINATION: ICD-10-CM

## 2024-06-20 LAB
BILIRUB BLD-MCNC: NEGATIVE MG/DL
CLARITY, POC: CLEAR
COLOR UR: NORMAL
EXPIRATION DATE: NORMAL
GLUCOSE UR STRIP-MCNC: NEGATIVE MG/DL
KETONES UR QL: NEGATIVE
LEUKOCYTE EST, POC: NEGATIVE
Lab: NORMAL
NITRITE UR-MCNC: NEGATIVE MG/ML
PH UR: 6 [PH] (ref 5–8)
PROT UR STRIP-MCNC: NEGATIVE MG/DL
RBC # UR STRIP: NEGATIVE /UL
SP GR UR: 1.03 (ref 1–1.03)
UROBILINOGEN UR QL: NORMAL

## 2024-06-20 PROCEDURE — 81003 URINALYSIS AUTO W/O SCOPE: CPT | Performed by: INTERNAL MEDICINE

## 2024-06-20 PROCEDURE — 90677 PCV20 VACCINE IM: CPT | Performed by: INTERNAL MEDICINE

## 2024-06-20 PROCEDURE — 90471 IMMUNIZATION ADMIN: CPT | Performed by: INTERNAL MEDICINE

## 2024-06-20 PROCEDURE — 90715 TDAP VACCINE 7 YRS/> IM: CPT | Performed by: INTERNAL MEDICINE

## 2024-06-20 PROCEDURE — 99397 PER PM REEVAL EST PAT 65+ YR: CPT | Performed by: INTERNAL MEDICINE

## 2024-06-20 PROCEDURE — 90472 IMMUNIZATION ADMIN EACH ADD: CPT | Performed by: INTERNAL MEDICINE

## 2024-06-20 RX ORDER — SEMAGLUTIDE 1 MG/.5ML
1 INJECTION, SOLUTION SUBCUTANEOUS WEEKLY
Qty: 2 ML | Refills: 0 | Status: SHIPPED | OUTPATIENT
Start: 2024-06-20

## 2024-06-20 RX ORDER — PRAMIPEXOLE DIHYDROCHLORIDE 0.5 MG/1
0.5 TABLET ORAL 3 TIMES DAILY
COMMUNITY

## 2024-06-20 NOTE — LETTER
[facility]  Vaccine Consent Form    Patient Name:  Katharine Lantigua  Patient :  1960     Vaccine(s) Ordered    Tdap Vaccine => 8yo IM (BOOSTRIX/ADACEL)  Pneumococcal Conjugate Vaccine 20-Valent (PCV20)        Screening Checklist  The following questions should be completed prior to vaccination. If you answer “yes” to any question, it does not necessarily mean you should not be vaccinated. It just means we may need to clarify or ask more questions. If a question is unclear, please ask your healthcare provider to explain it.    Yes No   Any fever or moderate to severe illness today (mild illness and/or antibiotic treatment are not contraindications)?     Do you have a history of a serious reaction to any previous vaccinations, such as anaphylaxis, encephalopathy within 7 days, Guillain-Columbus syndrome within 6 weeks, seizure?     Have you received any live vaccine(s) (e.g MMR, VIVEK) or any other vaccines in the last month (to ensure duplicate doses aren't given)?     Do you have an anaphylactic allergy to latex (DTaP, DTaP-IPV, Hep A, Hep B, MenB, RV, Td, Tdap), baker’s yeast (Hep B, HPV), polysorbates (RSV, nirsevimab, PCV 20, Rotavirrus, Tdap, Shingrix), or gelatin (VIVEK, MMR)?     Do you have an anaphylactic allergy to neomycin (Rabies, VIVEK, MMR, IPV, Hep A), polymyxin B (IPV), or streptomycin (IPV)?      Any cancer, leukemia, AIDS, or other immune system disorder? (VIVEK, MMR, RV)     Do you have a parent, brother, or sister with an immune system problem (if immune competence of vaccine recipient clinically verified, can proceed)? (MMR, VIVEK)     Any recent steroid treatments for >2 weeks, chemotherapy, or radiation treatment? (VIVEK, MMR)     Have you received antibody-containing blood transfusions or IVIG in the past 11 months (recommended interval is dependent on product)? (MMR, VIVEK)     Have you taken antiviral drugs (acyclovir, famciclovir, valacyclovir for VIVEK) in the last 24 or 48 hours, respectively?     "  Are you pregnant or planning to become pregnant within 1 month? (VIVEK, MMR, HPV, IPV, MenB, Abrexvy; For Hep B- refer to Engerix-B; For RSV - Abrysvo is indicated for 32-36 weeks of pregnancy from September to January)     For infants, have you ever been told your child has had intussusception or a medical emergency involving obstruction of the intestine (Rotavirus)? If not for an infant, can skip this question.         *Ordering Physicians/APC should be consulted if \"yes\" is checked by the patient or guardian above.  I have received, read, and understand the Vaccine Information Statement (VIS) for each vaccine ordered.  I have considered my or my child's health status as well as the health status of my close contacts.  I have taken the opportunity to discuss my vaccine questions with my or my child's health care provider.   I have requested that the ordered vaccine(s) be given to me or my child.  I understand the benefits and risks of the vaccines.  I understand that I should remain in the clinic for 15 minutes after receiving the vaccine(s).  _________________________________________________________  Signature of Patient or Parent/Legal Guardian ____________________  Date     "

## 2024-06-25 ENCOUNTER — LAB (OUTPATIENT)
Dept: INTERNAL MEDICINE | Facility: CLINIC | Age: 64
End: 2024-06-25
Payer: COMMERCIAL

## 2024-06-25 DIAGNOSIS — Z00.00 ROUTINE GENERAL MEDICAL EXAMINATION AT A HEALTH CARE FACILITY: ICD-10-CM

## 2024-06-25 DIAGNOSIS — R73.09 ELEVATED GLUCOSE: ICD-10-CM

## 2024-06-25 DIAGNOSIS — Z12.5 PROSTATE CANCER SCREENING: ICD-10-CM

## 2024-06-25 DIAGNOSIS — E55.9 VITAMIN D DEFICIENCY: ICD-10-CM

## 2024-06-25 LAB
DEPRECATED RDW RBC AUTO: 45.6 FL (ref 37–54)
ERYTHROCYTE [DISTWIDTH] IN BLOOD BY AUTOMATED COUNT: 13.4 % (ref 12.3–15.4)
HCT VFR BLD AUTO: 47.3 % (ref 37.5–51)
HGB BLD-MCNC: 15.8 G/DL (ref 13–17.7)
MCH RBC QN AUTO: 31.2 PG (ref 26.6–33)
MCHC RBC AUTO-ENTMCNC: 33.4 G/DL (ref 31.5–35.7)
MCV RBC AUTO: 93.5 FL (ref 79–97)
PLATELET # BLD AUTO: 313 10*3/MM3 (ref 140–450)
PMV BLD AUTO: 10.5 FL (ref 6–12)
RBC # BLD AUTO: 5.06 10*6/MM3 (ref 4.14–5.8)
WBC NRBC COR # BLD AUTO: 8.47 10*3/MM3 (ref 3.4–10.8)

## 2024-06-25 PROCEDURE — 80053 COMPREHEN METABOLIC PANEL: CPT | Performed by: INTERNAL MEDICINE

## 2024-06-25 PROCEDURE — 80061 LIPID PANEL: CPT | Performed by: INTERNAL MEDICINE

## 2024-06-25 PROCEDURE — G0103 PSA SCREENING: HCPCS | Performed by: INTERNAL MEDICINE

## 2024-06-25 PROCEDURE — 83036 HEMOGLOBIN GLYCOSYLATED A1C: CPT | Performed by: INTERNAL MEDICINE

## 2024-06-25 PROCEDURE — 84443 ASSAY THYROID STIM HORMONE: CPT | Performed by: INTERNAL MEDICINE

## 2024-06-25 PROCEDURE — 36415 COLL VENOUS BLD VENIPUNCTURE: CPT | Performed by: INTERNAL MEDICINE

## 2024-06-25 PROCEDURE — 85027 COMPLETE CBC AUTOMATED: CPT | Performed by: INTERNAL MEDICINE

## 2024-06-25 PROCEDURE — 82306 VITAMIN D 25 HYDROXY: CPT | Performed by: INTERNAL MEDICINE

## 2024-06-26 LAB
25(OH)D3 SERPL-MCNC: 32.4 NG/ML (ref 30–100)
ALBUMIN SERPL-MCNC: 4.1 G/DL (ref 3.5–5.2)
ALBUMIN/GLOB SERPL: 1.6 G/DL
ALP SERPL-CCNC: 60 U/L (ref 39–117)
ALT SERPL W P-5'-P-CCNC: 16 U/L (ref 1–41)
ANION GAP SERPL CALCULATED.3IONS-SCNC: 10.4 MMOL/L (ref 5–15)
AST SERPL-CCNC: 17 U/L (ref 1–40)
BILIRUB SERPL-MCNC: 0.4 MG/DL (ref 0–1.2)
BUN SERPL-MCNC: 18 MG/DL (ref 8–23)
BUN/CREAT SERPL: 22 (ref 7–25)
CALCIUM SPEC-SCNC: 9.6 MG/DL (ref 8.6–10.5)
CHLORIDE SERPL-SCNC: 109 MMOL/L (ref 98–107)
CHOLEST SERPL-MCNC: 141 MG/DL (ref 0–200)
CO2 SERPL-SCNC: 23.6 MMOL/L (ref 22–29)
CREAT SERPL-MCNC: 0.82 MG/DL (ref 0.76–1.27)
EGFRCR SERPLBLD CKD-EPI 2021: 97.5 ML/MIN/1.73
GLOBULIN UR ELPH-MCNC: 2.5 GM/DL
GLUCOSE SERPL-MCNC: 101 MG/DL (ref 65–99)
HBA1C MFR BLD: 5.6 % (ref 4.8–5.6)
HDLC SERPL-MCNC: 66 MG/DL (ref 40–60)
LDLC SERPL CALC-MCNC: 62 MG/DL (ref 0–100)
LDLC/HDLC SERPL: 0.95 {RATIO}
POTASSIUM SERPL-SCNC: 4.3 MMOL/L (ref 3.5–5.2)
PROT SERPL-MCNC: 6.6 G/DL (ref 6–8.5)
PSA SERPL-MCNC: 0.7 NG/ML (ref 0–4)
SODIUM SERPL-SCNC: 143 MMOL/L (ref 136–145)
TRIGL SERPL-MCNC: 63 MG/DL (ref 0–150)
TSH SERPL DL<=0.05 MIU/L-ACNC: 2.24 UIU/ML (ref 0.27–4.2)
VLDLC SERPL-MCNC: 13 MG/DL (ref 5–40)

## 2024-07-02 ENCOUNTER — TELEPHONE (OUTPATIENT)
Dept: INTERNAL MEDICINE | Facility: CLINIC | Age: 64
End: 2024-07-02
Payer: COMMERCIAL

## 2024-07-02 NOTE — TELEPHONE ENCOUNTER
"    Caller: Katharine Lantigua \"Luz\"    Relationship: Self    Best call back number: [phone]    Caller requesting test results: PATIENT    What test was performed: CT ANGIOGRAM AND LABS    When was the test performed: LAST WEEK    Where was the test performed: ANGIOGRAM AT [facility] / LABS AT OFFICE    Additional notes: ALSO REQUESTING RESULTS OF ANGIOGRAM  TO BE SENT TO CARDIOLOGIST DR SIL ZENG        "

## 2024-07-02 NOTE — TELEPHONE ENCOUNTER
Went over lab letter with the patient.  He would like the results of the CT angiogram.  He would also like the lab results, office note and CT sent to Dr. Melgar in [facility].

## 2024-07-02 NOTE — TELEPHONE ENCOUNTER
"    Name: Katharine Lantigua \"Luz\"    Relationship: Self    Best Callback Number: [phone]    HUB PROVIDED THE RELAY MESSAGE FROM THE OFFICE   PATIENT HAS FURTHER QUESTIONS AND WOULD LIKE A CALL BACK AT THE FOLLOWING PHONE NUMBER [phone]    ADDITIONAL INFORMATION: IN ADDITION TO OTHER QUESTIONS, PATIENT WANTS TO KNOW THE SIZE OF THE ANEURISM  "

## 2024-07-02 NOTE — TELEPHONE ENCOUNTER
HUB to relay:  Can you let pt know, the aneurysm I stable in size - 6 month f/u recommended. Lung nodule also stable in size- 1 year f/u on it    OK to mail CT results (it is in Care Everywhere) most recent labs, and office note) to Darrion Melgar     The office will mail the results to Dr. Melgar.

## 2024-07-24 RX ORDER — SEMAGLUTIDE 1.7 MG/.75ML
1.7 INJECTION, SOLUTION SUBCUTANEOUS WEEKLY
Qty: 3 ML | Refills: 0 | Status: SHIPPED | OUTPATIENT
Start: 2024-07-24

## 2024-08-24 DIAGNOSIS — K21.9 GASTROESOPHAGEAL REFLUX DISEASE WITHOUT ESOPHAGITIS: ICD-10-CM

## 2024-08-24 DIAGNOSIS — R49.0 HOARSENESS: ICD-10-CM

## 2024-08-26 RX ORDER — METOPROLOL TARTRATE 25 MG/1
25 TABLET, FILM COATED ORAL 2 TIMES DAILY
Qty: 180 TABLET | Refills: 1 | OUTPATIENT
Start: 2024-08-26

## 2024-08-26 RX ORDER — OMEPRAZOLE 40 MG/1
CAPSULE, DELAYED RELEASE ORAL
Qty: 180 CAPSULE | Refills: 0 | Status: SHIPPED | OUTPATIENT
Start: 2024-08-26

## 2024-08-26 RX ORDER — SEMAGLUTIDE 1.7 MG/.75ML
INJECTION, SOLUTION SUBCUTANEOUS
Qty: 3 ML | Refills: 2 | Status: SHIPPED | OUTPATIENT
Start: 2024-08-26

## 2024-08-26 NOTE — TELEPHONE ENCOUNTER
Rx Refill Note  Requested Prescriptions     Pending Prescriptions Disp Refills    Semaglutide-Weight Management (Wegovy) 1.7 MG/0.75ML solution auto-injector [Pharmacy Med Name: WEGOVY 1.7MG/0.75ML INJ ( 4 PENS)] 3 mL 2     Sig: INJECT 1.7MG UNDER THE SKIN ONCE PER WEEK      Last office visit with prescribing clinician: 6/20/2024     Next office visit with prescribing clinician: 12/20/2024     Rebecca Rutherford  08/26/24, 17:23 EDT

## 2024-08-26 NOTE — TELEPHONE ENCOUNTER
Rx Refill Note  Requested Prescriptions     Pending Prescriptions Disp Refills    omeprazole (priLOSEC) 40 MG capsule [Pharmacy Med Name: OMEPRAZOLE 40MG CAPSULES] 180 capsule 0     Sig: TAKE 1 CAPSULE BY MOUTH TWICE DAILY     Refused Prescriptions Disp Refills    metoprolol tartrate (LOPRESSOR) 25 MG tablet [Pharmacy Med Name: METOPROLOL TARTRATE 25MG TABLETS] 180 tablet 1     Sig: TAKE 1 TABLET BY MOUTH TWICE DAILY      Last office visit with prescribing clinician: 6/20/2024     Next office visit with prescribing clinician: 12/20/2024       Rebecca Rutherford  08/26/24, 07:40 EDT

## 2024-11-15 RX ORDER — SEMAGLUTIDE 2.4 MG/.75ML
2.4 INJECTION, SOLUTION SUBCUTANEOUS WEEKLY
Qty: 3 ML | Refills: 5 | Status: SHIPPED | OUTPATIENT
Start: 2024-11-15

## 2024-11-22 RX ORDER — LEVOTHYROXINE SODIUM 100 UG/1
100 TABLET ORAL DAILY
Qty: 90 TABLET | Refills: 2 | Status: SHIPPED | OUTPATIENT
Start: 2024-11-22

## 2024-11-22 NOTE — TELEPHONE ENCOUNTER
Rx Refill Note  Requested Prescriptions     Pending Prescriptions Disp Refills    levothyroxine (SYNTHROID, LEVOTHROID) 100 MCG tablet [Pharmacy Med Name: LEVOTHYROXINE 0.100MG (100MCG) TAB] 90 tablet 2     Sig: TAKE 1 TABLET BY MOUTH DAILY      Last office visit with prescribing clinician: 6/20/2024   Last telemedicine visit with prescribing clinician: Visit date not found   Next office visit with prescribing clinician: 12/20/2024     Lisa Parker MA  11/22/24, 16:08 EST

## 2024-12-17 DIAGNOSIS — K21.9 GASTROESOPHAGEAL REFLUX DISEASE WITHOUT ESOPHAGITIS: ICD-10-CM

## 2024-12-17 DIAGNOSIS — R49.0 HOARSENESS: ICD-10-CM

## 2024-12-17 RX ORDER — OMEPRAZOLE 40 MG/1
CAPSULE, DELAYED RELEASE ORAL
Qty: 60 CAPSULE | Refills: 0 | Status: SHIPPED | OUTPATIENT
Start: 2024-12-17

## 2024-12-17 RX ORDER — OMEPRAZOLE 40 MG/1
CAPSULE, DELAYED RELEASE ORAL
Qty: 180 CAPSULE | OUTPATIENT
Start: 2024-12-17

## 2024-12-17 NOTE — TELEPHONE ENCOUNTER
Rx Refill Note  Requested Prescriptions     Pending Prescriptions Disp Refills    omeprazole (priLOSEC) 40 MG capsule [Pharmacy Med Name: OMEPRAZOLE 40MG CAPSULES] 60 capsule 0     Sig: TAKE 1 CAPSULE BY MOUTH TWICE DAILY      Last office visit with prescribing clinician: 6/20/2024     Next office visit with prescribing clinician: 12/20/2024       Rebecca Rutherford  12/17/24, 12:10 EST

## 2024-12-20 ENCOUNTER — OFFICE VISIT (OUTPATIENT)
Dept: INTERNAL MEDICINE | Facility: CLINIC | Age: 64
End: 2024-12-20
Payer: COMMERCIAL

## 2024-12-20 VITALS
HEIGHT: 71 IN | WEIGHT: 260.4 LBS | BODY MASS INDEX: 36.46 KG/M2 | HEART RATE: 80 BPM | TEMPERATURE: 97.1 F | SYSTOLIC BLOOD PRESSURE: 132 MMHG | RESPIRATION RATE: 14 BRPM | DIASTOLIC BLOOD PRESSURE: 88 MMHG | OXYGEN SATURATION: 97 %

## 2024-12-20 DIAGNOSIS — E66.9 OBESITY (BMI 30-39.9): ICD-10-CM

## 2024-12-20 DIAGNOSIS — K59.03 DRUG-INDUCED CONSTIPATION: ICD-10-CM

## 2024-12-20 DIAGNOSIS — Z23 NEED FOR INFLUENZA VACCINATION: ICD-10-CM

## 2024-12-20 DIAGNOSIS — K59.09 OTHER CONSTIPATION: ICD-10-CM

## 2024-12-20 DIAGNOSIS — Z23 NEED FOR COVID-19 VACCINE: ICD-10-CM

## 2024-12-20 DIAGNOSIS — I71.21 ANEURYSM OF ASCENDING AORTA WITHOUT RUPTURE: ICD-10-CM

## 2024-12-20 DIAGNOSIS — E03.9 ACQUIRED HYPOTHYROIDISM: Primary | Chronic | ICD-10-CM

## 2024-12-20 PROCEDURE — 90662 IIV NO PRSV INCREASED AG IM: CPT | Performed by: INTERNAL MEDICINE

## 2024-12-20 PROCEDURE — 91320 SARSCV2 VAC 30MCG TRS-SUC IM: CPT | Performed by: INTERNAL MEDICINE

## 2024-12-20 PROCEDURE — 90471 IMMUNIZATION ADMIN: CPT | Performed by: INTERNAL MEDICINE

## 2024-12-20 PROCEDURE — 99214 OFFICE O/P EST MOD 30 MIN: CPT | Performed by: INTERNAL MEDICINE

## 2024-12-20 PROCEDURE — 90480 ADMN SARSCOV2 VAC 1/ONLY CMP: CPT | Performed by: INTERNAL MEDICINE

## 2024-12-20 RX ORDER — FINASTERIDE 5 MG/1
1 TABLET, FILM COATED ORAL DAILY
COMMUNITY
Start: 2024-12-13

## 2024-12-20 NOTE — PROGRESS NOTES
Chief Complaint  Hypothyroidism and Heartburn    Subjective          Katharine Lantigua presents to [facility]    History of Present Illness    Hypertension-patient is on metoprolol and losartan    hyperlipidemia-patient on Crestor 10.  FLP looked good in 6/24    Hypothyroid-patient on Synthroid 100.  TSH normal in 6/24    obesity-patient has been on Wegovy over the last 6-8 months and weight is down 30+pounds. He raised to 2.4 recently. Has felt some lower abdominal pain over last week that is constant. Did have a sized BM yesterday but didn't seem to relieve the discomfort. Hadn't had a BM prior to that for about a week.   He will take linzess as needed-I had given him samples at last visit 6 months ago and he has only a few left.  It does seem to help when he takes the Linzess    TACO-unable to tolerate CPAP    Ascending aortic aneurysm-had CT a at  6/24 and is due for 6-month follow-up now    Left upper lobe nodule-CT scan in June was stable in 1 year follow-up recommended for this    Cervical stenosis-patient sees [facility] neurosurgery and pain management      LBP -he will have an MRI of the spine soon     Restless legs, probable Parkinson's-  He sees Dr Melgar and saw him in Dec and is on pramipexole which has helped w/ RLS. Dr Melagr ordered PT as well to help with some of the upper extremity symptoms he is having.    Right hip pain-patient will see Dr. Allen soon          Current Outpatient Medications:     acetaminophen (TYLENOL) 500 MG tablet, Take 2 tablets by mouth 2 (Two) Times a Day., Disp: , Rfl:     finasteride (PROSCAR) 5 MG tablet, Take 1 tablet by mouth Daily., Disp: , Rfl:     Jardiance 10 MG tablet tablet, Take 1 tablet by mouth Daily., Disp: , Rfl:     levothyroxine (SYNTHROID, LEVOTHROID) 100 MCG tablet, TAKE 1 TABLET BY MOUTH DAILY, Disp: 90 tablet, Rfl: 2    linaclotide (Linzess) 72 MCG capsule capsule, Take 1 capsule by mouth Every Morning Before  "Breakfast., Disp: 30 capsule, Rfl: 11    losartan (COZAAR) 100 MG tablet, Take 1 tablet by mouth Daily., Disp: 90 tablet, Rfl: 1    metoprolol tartrate (LOPRESSOR) 25 MG tablet, Take 1 tablet by mouth 2 (Two) Times a Day., Disp: 180 tablet, Rfl: 1    omeprazole (priLOSEC) 40 MG capsule, TAKE 1 CAPSULE BY MOUTH TWICE DAILY, Disp: 60 capsule, Rfl: 0    pramipexole (MIRAPEX) 0.5 MG tablet, Take 2 tablets by mouth 3 (Three) Times a Day., Disp: , Rfl:     rosuvastatin (CRESTOR) 10 MG tablet, Take 1 tablet by mouth Daily., Disp: 90 tablet, Rfl: 3    Semaglutide-Weight Management (Wegovy) 2.4 MG/0.75ML solution auto-injector, Inject 2.4 mg under the skin into the appropriate area as directed 1 (One) Time Per Week., Disp: 3 mL, Rfl: 5   The following portions of the patient's history were reviewed and updated as appropriate: allergies, current medications, past family history, past medical history, past social history, past surgical history and problem list.     Objective   Vital Signs:   /88 (BP Location: Left arm, Patient Position: Sitting, Cuff Size: Adult)   Pulse 80   Temp 97.1 °F (36.2 °C) (Infrared)   Resp 14   Ht 179.3 cm (70.59\")   Wt 118 kg (260 lb 6.4 oz)   SpO2 97%   BMI 36.74 kg/m²       Physical exam  Constitutional: oriented to person, place, and time.  well-developed and well-nourished. No distress.   HENT:   Head: Normocephalic and atraumatic.   Eyes: Conjunctivae and EOM are normal.   Cardiovascular: Normal rate, regular rhythm and normal heart sounds.  Exam reveals no gallop and no friction rub.    No murmur heard.  Pulmonary/Chest: Effort normal and breath sounds normal. No respiratory distress.   no wheezes.   Abd: Soft, nondistended, tenderness in lower abdomen today and mild epigastric tenderness.  No rebound, no guarding  Neurological:  alert and oriented to person, place, and time.   Skin: Skin is warm and dry. not diaphoretic.   Psychiatric:  normal mood and affect. behavior is normal. " Judgment and thought content normal.      Physical Exam     Results         Result Review :                  TSH   Date Value Ref Range Status   06/25/2024 2.240 0.270 - 4.200 uIU/mL Final     Hemoglobin A1C   Date Value Ref Range Status   06/25/2024 5.60 4.80 - 5.60 % Final          Assessment and Plan      Diagnoses and all orders for this visit:    1. Acquired hypothyroidism (Primary)-patient will return for blood work  -     TSH; Future    2. Need for influenza vaccination  -     Fluzone High-Dose 65+yrs (2001-7163)    3. Need for COVID-19 vaccine  -     COVID-19 (Pfizer) 12yrs+ (COMIRNATY)    4. Other constipation-patient did have good bowel movement yesterday but prior to that had not had 1 in a week some may still be some stool causing the discomfort in the abdomen.  We will check a KUB and blood work.  We will go ahead and have him do Linzess every day for prevention as long as he is on the Wegovy.  I gave him samples of the Linzess and we will send it in as a prescription as well to see if it is covered  We will also check TSH  Call/return if any significant worsening of pain  -     XR Abdomen KUB; Future  -     CBC & Differential; Future  -     Comprehensive Metabolic Panel; Future  -     Vitamin D,25-Hydroxy; Future  -     linaclotide (Linzess) 72 MCG capsule capsule; Take 1 capsule by mouth Every Morning Before Breakfast.  Dispense: 30 capsule; Refill: 11      5.  Obesity-BMI 36-patient doing well with Wegovy and has lost significant weight though unfortunately still has many skill skeletal issues which she is seeing neurosurgery and orthopedics for regularly    6.  Thoracic aneurysm-6-month follow-up is due now and we will schedule at     7.   Lung nodule-repeat CT will be due 6/25    2.  Preventative-[facility]. Acevedo at the end of the month  Assessment & Plan           Follow Up   Return in about 6 months (around 6/20/2025) for Annual  physical.  Patient was given instructions and counseling regarding his condition or for health maintenance advice. Please see specific information pulled into the AVS if appropriate.

## 2024-12-21 RX ORDER — METOPROLOL TARTRATE 25 MG/1
25 TABLET, FILM COATED ORAL 2 TIMES DAILY
Qty: 180 TABLET | Refills: 1 | Status: SHIPPED | OUTPATIENT
Start: 2024-12-21

## 2024-12-23 ENCOUNTER — PRIOR AUTHORIZATION (OUTPATIENT)
Dept: INTERNAL MEDICINE | Facility: CLINIC | Age: 64
End: 2024-12-23
Payer: COMMERCIAL

## 2024-12-23 ENCOUNTER — HOSPITAL ENCOUNTER (OUTPATIENT)
Dept: GENERAL RADIOLOGY | Facility: HOSPITAL | Age: 64
Discharge: HOME | End: 2024-12-23
Payer: COMMERCIAL

## 2024-12-23 ENCOUNTER — LAB (OUTPATIENT)
Dept: LAB | Facility: HOSPITAL | Age: 64
End: 2024-12-23
Payer: COMMERCIAL

## 2024-12-23 DIAGNOSIS — K59.09 OTHER CONSTIPATION: ICD-10-CM

## 2024-12-23 DIAGNOSIS — E03.9 ACQUIRED HYPOTHYROIDISM: Chronic | ICD-10-CM

## 2024-12-23 LAB
ALBUMIN SERPL-MCNC: 4.1 G/DL (ref 3.5–5.2)
ALBUMIN/GLOB SERPL: 1.5 G/DL
ALP SERPL-CCNC: 74 U/L (ref 39–117)
ALT SERPL W P-5'-P-CCNC: 18 U/L (ref 1–41)
ANION GAP SERPL CALCULATED.3IONS-SCNC: 8.3 MMOL/L (ref 5–15)
AST SERPL-CCNC: 17 U/L (ref 1–40)
BASOPHILS # BLD AUTO: 0.03 10*3/MM3 (ref 0–0.2)
BASOPHILS NFR BLD AUTO: 0.5 % (ref 0–1.5)
BILIRUB SERPL-MCNC: 0.5 MG/DL (ref 0–1.2)
BUN SERPL-MCNC: 17 MG/DL (ref 8–23)
BUN/CREAT SERPL: 18.3 (ref 7–25)
CALCIUM SPEC-SCNC: 9.3 MG/DL (ref 8.6–10.5)
CHLORIDE SERPL-SCNC: 106 MMOL/L (ref 98–107)
CO2 SERPL-SCNC: 26.7 MMOL/L (ref 22–29)
CREAT SERPL-MCNC: 0.93 MG/DL (ref 0.76–1.27)
DEPRECATED RDW RBC AUTO: 44.5 FL (ref 37–54)
EGFRCR SERPLBLD CKD-EPI 2021: 91.1 ML/MIN/1.73
EOSINOPHIL # BLD AUTO: 0.15 10*3/MM3 (ref 0–0.4)
EOSINOPHIL NFR BLD AUTO: 2.7 % (ref 0.3–6.2)
ERYTHROCYTE [DISTWIDTH] IN BLOOD BY AUTOMATED COUNT: 13.2 % (ref 12.3–15.4)
GLOBULIN UR ELPH-MCNC: 2.8 GM/DL
GLUCOSE SERPL-MCNC: 92 MG/DL (ref 65–99)
HCT VFR BLD AUTO: 49.5 % (ref 37.5–51)
HGB BLD-MCNC: 17.1 G/DL (ref 13–17.7)
IMM GRANULOCYTES # BLD AUTO: 0.01 10*3/MM3 (ref 0–0.05)
IMM GRANULOCYTES NFR BLD AUTO: 0.2 % (ref 0–0.5)
LYMPHOCYTES # BLD AUTO: 1.34 10*3/MM3 (ref 0.7–3.1)
LYMPHOCYTES NFR BLD AUTO: 23.7 % (ref 19.6–45.3)
MCH RBC QN AUTO: 31.9 PG (ref 26.6–33)
MCHC RBC AUTO-ENTMCNC: 34.5 G/DL (ref 31.5–35.7)
MCV RBC AUTO: 92.4 FL (ref 79–97)
MONOCYTES # BLD AUTO: 0.59 10*3/MM3 (ref 0.1–0.9)
MONOCYTES NFR BLD AUTO: 10.4 % (ref 5–12)
NEUTROPHILS NFR BLD AUTO: 3.54 10*3/MM3 (ref 1.7–7)
NEUTROPHILS NFR BLD AUTO: 62.5 % (ref 42.7–76)
NRBC BLD AUTO-RTO: 0 /100 WBC (ref 0–0.2)
PLATELET # BLD AUTO: 276 10*3/MM3 (ref 140–450)
PMV BLD AUTO: 10.3 FL (ref 6–12)
POTASSIUM SERPL-SCNC: 4.3 MMOL/L (ref 3.5–5.2)
PROT SERPL-MCNC: 6.9 G/DL (ref 6–8.5)
RBC # BLD AUTO: 5.36 10*6/MM3 (ref 4.14–5.8)
SODIUM SERPL-SCNC: 141 MMOL/L (ref 136–145)
TSH SERPL DL<=0.05 MIU/L-ACNC: 2.35 UIU/ML (ref 0.27–4.2)
WBC NRBC COR # BLD AUTO: 5.66 10*3/MM3 (ref 3.4–10.8)

## 2024-12-23 PROCEDURE — 80053 COMPREHEN METABOLIC PANEL: CPT

## 2024-12-23 PROCEDURE — 74018 RADEX ABDOMEN 1 VIEW: CPT

## 2024-12-23 PROCEDURE — 82306 VITAMIN D 25 HYDROXY: CPT

## 2024-12-23 PROCEDURE — 85025 COMPLETE CBC W/AUTO DIFF WBC: CPT

## 2024-12-23 PROCEDURE — 84443 ASSAY THYROID STIM HORMONE: CPT

## 2024-12-23 NOTE — TELEPHONE ENCOUNTER
PA submitted on Linzess 72 mcg.  KEY: [id number]  Insurance prefers:  [employer].  PA approved until 12/23/2025.  Pharmacy has been notified of the approval.

## 2024-12-24 LAB — 25(OH)D3 SERPL-MCNC: 17.4 NG/ML (ref 30–100)

## 2024-12-26 ENCOUNTER — TELEPHONE (OUTPATIENT)
Dept: INTERNAL MEDICINE | Facility: CLINIC | Age: 64
End: 2024-12-26
Payer: COMMERCIAL

## 2024-12-26 RX ORDER — CHOLECALCIFEROL (VITAMIN D3) 1250 MCG
50000 CAPSULE ORAL
Qty: 4 CAPSULE | Refills: 11 | Status: SHIPPED | OUTPATIENT
Start: 2024-12-26

## 2024-12-26 NOTE — TELEPHONE ENCOUNTER
Results are not finished yet.  However looking at the x-ray he has a lot of gas.  He can try some simethicone, which is over-the-counter such as Phazyme 95 or Mylicon

## 2024-12-26 NOTE — TELEPHONE ENCOUNTER
Attempted to contact patient regarding some abdominal pain he had been having. No answer. LVM to return phone call to triage and possibly make an appointment

## 2024-12-26 NOTE — TELEPHONE ENCOUNTER
Patient returned phone call. He saw  last week for abdominal pain and he continues to have pain. She prescribed Linzess. He went to get an xray at [facility] and they told him to call his PCP in order to get the results expedited.

## 2024-12-27 ENCOUNTER — TELEPHONE (OUTPATIENT)
Dept: INTERNAL MEDICINE | Facility: CLINIC | Age: 64
End: 2024-12-27
Payer: COMMERCIAL

## 2024-12-27 NOTE — TELEPHONE ENCOUNTER
CALLED PATIENT AND TOLD HIM TO CALL [facility] HE STATES THAT HE HAS AND THEY KEEP ASKING HIM ABOUT THE XRAY. IF IT HAS BEEN READ YET.     PLEASE ADVISE.

## 2024-12-31 ENCOUNTER — TELEPHONE (OUTPATIENT)
Dept: INTERNAL MEDICINE | Facility: CLINIC | Age: 64
End: 2024-12-31
Payer: COMMERCIAL

## 2024-12-31 RX ORDER — SEMAGLUTIDE 1.7 MG/.75ML
1.7 INJECTION, SOLUTION SUBCUTANEOUS WEEKLY
Qty: 3 ML | Refills: 5 | Status: SHIPPED | OUTPATIENT
Start: 2024-12-31

## 2024-12-31 NOTE — TELEPHONE ENCOUNTER
Patient called and stated he tried to get a coloscopy done yesterday at [facility] with Dr. Charles but it was not completed due to a solid mass. He stated he would like a call back to discuss at [phone].

## 2024-12-31 NOTE — TELEPHONE ENCOUNTER
Patient is called and is concerned that xray only showed gas build up.  When he had colonoscopy was told he had a mass of stool build up in colon.  Why was this not seen on x-ray.  He is concerned the wegovy may be causing his constipation.  He followed the prep for the colonoscopy.  He would like Dr. Chandler's opinion on this matter.  He is still having the same belly pain he had when he was in he office.

## 2024-12-31 NOTE — TELEPHONE ENCOUNTER
Patient has been notified and states the linzess makes him have watery diarrhea but not a lot of stool.  He stopped the linzess while he was prepping for the colonoscopy.  He started back on it yesterday.  He is okay with lowering the dose of wegovy and will schedule a follow up.

## 2024-12-31 NOTE — TELEPHONE ENCOUNTER
The wegovy is likely the cause of the constipation and stool build up. We could go to the next lower dose of the wegovy and see if there is less constipation w/ that, and also has he been able to take the Linzess every day? Has that been helping w/ the BMs?  Xray has limitations in what it can see - sometimes it will show a large amount of stool, but not always  We can also have him f/u w/ me in about 2 weeks to see how he is doing w/ the BMs and we could consider lowering dose further on wegovy if we need to

## 2025-01-02 ENCOUNTER — TELEPHONE (OUTPATIENT)
Dept: INTERNAL MEDICINE | Facility: CLINIC | Age: 65
End: 2025-01-02
Payer: COMMERCIAL

## 2025-01-02 NOTE — TELEPHONE ENCOUNTER
"  Caller: Katharine Lantigua \"Luz\"    Relationship: Self    Best call back number: [phone]       What was the call regarding: PATIENT NEEDS HIS X-RAY RESULTS FROM 12/23/24 FAXED OVER TO DR DICK FERRARI -[phone] AND THEY NEED THEM THIS AFTERNOON IF POSSIBLE    Is it okay if the provider responds through MyChart:       "

## 2025-01-07 ENCOUNTER — PRIOR AUTHORIZATION (OUTPATIENT)
Dept: INTERNAL MEDICINE | Facility: CLINIC | Age: 65
End: 2025-01-07
Payer: COMMERCIAL

## 2025-01-07 DIAGNOSIS — K21.9 GASTROESOPHAGEAL REFLUX DISEASE WITHOUT ESOPHAGITIS: ICD-10-CM

## 2025-01-07 DIAGNOSIS — R49.0 HOARSENESS: ICD-10-CM

## 2025-01-07 RX ORDER — OMEPRAZOLE 40 MG/1
CAPSULE, DELAYED RELEASE ORAL
Qty: 180 CAPSULE | Refills: 1 | Status: SHIPPED | OUTPATIENT
Start: 2025-01-07

## 2025-01-07 NOTE — TELEPHONE ENCOUNTER
PA submitted on wegovy 1.7 mg.  KEY: [id number]  PA approved until 1/21/2025. Good for a one time fill. Pharmacy has been notified of the approval.

## 2025-01-23 ENCOUNTER — TELEPHONE (OUTPATIENT)
Dept: INTERNAL MEDICINE | Facility: CLINIC | Age: 65
End: 2025-01-23

## 2025-01-23 NOTE — TELEPHONE ENCOUNTER
Called and lvm for patient to return call, office number given.     We have received patient assistance paperwork for patient for his Wegovy. The dose on the paperwork does not match the dose that is prescribed by Dr. Chandler. Also need to know what kind of pharmacy insurance (prescription insurance) he has.     Paperwork has been placed in Dr. Chandler's folder awaiting responses.

## 2025-01-28 NOTE — TELEPHONE ENCOUNTER
Called and lvm for patient to return call, office number given.     RELAY:     Please confirm what dose of Wegovy he is currently taking. Also confirm what kind of pharmacy benefits he has.

## 2025-01-28 NOTE — TELEPHONE ENCOUNTER
"    Name: Katharine Lantigua \"Luz\"    Relationship: Self        HUB PROVIDED THE RELAY MESSAGE FROM THE OFFICE   PATIENT VOICED UNDERSTANDING AND HAS NO FURTHER QUESTIONS AT THIS TIME    ADDITIONAL INFORMATION: THE PATIENT STATES THAT HE CURRENTLY TAKES 2.4 MG  HE STATES THAT HE HAS [facility]  HE STATES THAT HE HAS GOOD PHARMACY BENEFITS  HE WOULD LIKE TO KNOW WHAT THE QUESTION ABOUT PHARMACY BENEFITS MEANS   "

## 2025-01-29 NOTE — TELEPHONE ENCOUNTER
Patient returned a call to the office, received updated information from patient to fill out patient assistance forms. Patient states that he was on the 2.4 mg dose but reduced it down due to the colonoscopy and GI issues. He has since gone back up to the 2.4 mg dose but is still having the abdominal pain and constipation. He is willing to try the 1.7 mg dose and I have filled the paperwork out to reflect that dose.     Paperwork placed on Dr. Chandler's desk for her to sign.

## 2025-02-18 ENCOUNTER — TELEPHONE (OUTPATIENT)
Dept: INTERNAL MEDICINE | Facility: CLINIC | Age: 65
End: 2025-02-18
Payer: COMMERCIAL

## 2025-02-18 NOTE — TELEPHONE ENCOUNTER
HUB can relay:  Regarding: CT results  Can you let him know, the martha nodule and aneurysm are stable in size and we can recheck in 6 months

## 2025-02-18 NOTE — TELEPHONE ENCOUNTER
----- Message from Gail Chandler sent at 2/18/2025  7:32 AM EST -----  Regarding: CT results  Can you let him know, the martha nodule and aneurysm are stable in size and we can recheck in 6 months

## 2025-05-08 ENCOUNTER — TELEPHONE (OUTPATIENT)
Dept: INTERNAL MEDICINE | Facility: CLINIC | Age: 65
End: 2025-05-08

## 2025-05-08 NOTE — TELEPHONE ENCOUNTER
"  Caller: Katharine Lantigua \"Luz\"    Relationship: Self    Best call back number: [phone]    What is the medical concern/diagnosis: SKIN CANCER      What is the provider, practice or medical service name: [facility],DR AYALA       Any additional details: PATIENT WAS SEEN IN 2018 AT THIS LOCATION                "

## 2025-05-09 NOTE — TELEPHONE ENCOUNTER
Called and spoke with patient, he states that the spot that was previously removed from his face back in 2018 and it was cancerous has returned. He states that he called [facility] to have this spot removed since they did the first one and he was told he needs a referral.

## 2025-07-13 DIAGNOSIS — R49.0 HOARSENESS: ICD-10-CM

## 2025-07-13 DIAGNOSIS — K21.9 GASTROESOPHAGEAL REFLUX DISEASE WITHOUT ESOPHAGITIS: ICD-10-CM

## 2025-07-14 RX ORDER — OMEPRAZOLE 40 MG/1
40 CAPSULE, DELAYED RELEASE ORAL EVERY 12 HOURS SCHEDULED
Qty: 180 CAPSULE | Refills: 1 | Status: SHIPPED | OUTPATIENT
Start: 2025-07-14

## 2025-08-26 RX ORDER — SEMAGLUTIDE 2.4 MG/.75ML
2.4 INJECTION, SOLUTION SUBCUTANEOUS WEEKLY
Qty: 3 ML | Refills: 0 | Status: SHIPPED | OUTPATIENT
Start: 2025-08-26